# Patient Record
Sex: FEMALE | Race: WHITE | NOT HISPANIC OR LATINO | Employment: OTHER | URBAN - NONMETROPOLITAN AREA
[De-identification: names, ages, dates, MRNs, and addresses within clinical notes are randomized per-mention and may not be internally consistent; named-entity substitution may affect disease eponyms.]

---

## 2018-07-09 RX ORDER — LISINOPRIL 40 MG/1
40 TABLET ORAL DAILY
COMMUNITY
End: 2022-07-26 | Stop reason: HOSPADM

## 2018-07-09 RX ORDER — HYDROCODONE BITARTRATE AND ACETAMINOPHEN 7.5; 325 MG/1; MG/1
1 TABLET ORAL 2 TIMES DAILY
COMMUNITY
End: 2018-07-20 | Stop reason: HOSPADM

## 2018-07-09 RX ORDER — MONTELUKAST SODIUM 10 MG/1
10 TABLET ORAL NIGHTLY
COMMUNITY

## 2018-07-10 ENCOUNTER — APPOINTMENT (OUTPATIENT)
Dept: PREADMISSION TESTING | Facility: HOSPITAL | Age: 78
End: 2018-07-10

## 2018-07-10 ENCOUNTER — HOSPITAL ENCOUNTER (OUTPATIENT)
Dept: GENERAL RADIOLOGY | Facility: HOSPITAL | Age: 78
Discharge: HOME | End: 2018-07-10
Admitting: ORTHOPAEDIC SURGERY

## 2018-07-10 VITALS
BODY MASS INDEX: 23.39 KG/M2 | HEART RATE: 53 BPM | DIASTOLIC BLOOD PRESSURE: 70 MMHG | SYSTOLIC BLOOD PRESSURE: 152 MMHG | HEIGHT: 61 IN | OXYGEN SATURATION: 99 % | RESPIRATION RATE: 16 BRPM | WEIGHT: 123.9 LBS

## 2018-07-10 LAB
ALBUMIN SERPL-MCNC: 4.2 G/DL (ref 3.5–5)
ALBUMIN/GLOB SERPL: 1.3 G/DL (ref 1.1–2.5)
ALP SERPL-CCNC: 53 U/L (ref 24–120)
ALT SERPL W P-5'-P-CCNC: 25 U/L (ref 0–54)
ANION GAP SERPL CALCULATED.3IONS-SCNC: 10 MMOL/L (ref 4–13)
APTT PPP: 30.8 SECONDS (ref 24.1–34.8)
AST SERPL-CCNC: 31 U/L (ref 7–45)
BACTERIA UR QL AUTO: ABNORMAL /HPF
BASOPHILS # BLD AUTO: 0.02 10*3/MM3 (ref 0–0.2)
BASOPHILS NFR BLD AUTO: 0.5 % (ref 0–2)
BILIRUB SERPL-MCNC: 0.7 MG/DL (ref 0.1–1)
BILIRUB UR QL STRIP: NEGATIVE
BUN BLD-MCNC: 7 MG/DL (ref 5–21)
BUN/CREAT SERPL: 11.9 (ref 7–25)
CALCIUM SPEC-SCNC: 9.5 MG/DL (ref 8.4–10.4)
CHLORIDE SERPL-SCNC: 93 MMOL/L (ref 98–110)
CLARITY UR: CLEAR
CO2 SERPL-SCNC: 27 MMOL/L (ref 24–31)
COLOR UR: YELLOW
CREAT BLD-MCNC: 0.59 MG/DL (ref 0.5–1.4)
DEPRECATED RDW RBC AUTO: 41.8 FL (ref 40–54)
EOSINOPHIL # BLD AUTO: 0.04 10*3/MM3 (ref 0–0.7)
EOSINOPHIL NFR BLD AUTO: 1 % (ref 0–4)
ERYTHROCYTE [DISTWIDTH] IN BLOOD BY AUTOMATED COUNT: 13.1 % (ref 12–15)
GFR SERPL CREATININE-BSD FRML MDRD: 99 ML/MIN/1.73
GLOBULIN UR ELPH-MCNC: 3.2 GM/DL
GLUCOSE BLD-MCNC: 104 MG/DL (ref 70–100)
GLUCOSE UR STRIP-MCNC: NEGATIVE MG/DL
HCT VFR BLD AUTO: 29.7 % (ref 37–47)
HGB BLD-MCNC: 10.5 G/DL (ref 12–16)
HGB UR QL STRIP.AUTO: NEGATIVE
IMM GRANULOCYTES # BLD: 0.01 10*3/MM3 (ref 0–0.03)
IMM GRANULOCYTES NFR BLD: 0.2 % (ref 0–5)
INR PPP: 1 (ref 0.91–1.09)
KETONES UR QL STRIP: NEGATIVE
LEUKOCYTE ESTERASE UR QL STRIP.AUTO: ABNORMAL
LYMPHOCYTES # BLD AUTO: 1.5 10*3/MM3 (ref 0.72–4.86)
LYMPHOCYTES NFR BLD AUTO: 36.4 % (ref 15–45)
MCH RBC QN AUTO: 31 PG (ref 28–32)
MCHC RBC AUTO-ENTMCNC: 35.4 G/DL (ref 33–36)
MCV RBC AUTO: 87.6 FL (ref 82–98)
MONOCYTES # BLD AUTO: 0.41 10*3/MM3 (ref 0.19–1.3)
MONOCYTES NFR BLD AUTO: 10 % (ref 4–12)
NEUTROPHILS # BLD AUTO: 2.14 10*3/MM3 (ref 1.87–8.4)
NEUTROPHILS NFR BLD AUTO: 51.9 % (ref 39–78)
NITRITE UR QL STRIP: NEGATIVE
NRBC BLD MANUAL-RTO: 0 /100 WBC (ref 0–0)
PH UR STRIP.AUTO: 7 [PH] (ref 5–8)
PLATELET # BLD AUTO: 341 10*3/MM3 (ref 130–400)
PMV BLD AUTO: 10.4 FL (ref 6–12)
POTASSIUM BLD-SCNC: 4.3 MMOL/L (ref 3.5–5.3)
PROT SERPL-MCNC: 7.4 G/DL (ref 6.3–8.7)
PROT UR QL STRIP: NEGATIVE
PROTHROMBIN TIME: 13.5 SECONDS (ref 11.9–14.6)
RBC # BLD AUTO: 3.39 10*6/MM3 (ref 4.2–5.4)
RBC # UR: ABNORMAL /HPF
REF LAB TEST METHOD: ABNORMAL
SODIUM BLD-SCNC: 130 MMOL/L (ref 135–145)
SP GR UR STRIP: <=1.005 (ref 1–1.03)
SQUAMOUS #/AREA URNS HPF: ABNORMAL /HPF
UROBILINOGEN UR QL STRIP: ABNORMAL
WBC NRBC COR # BLD: 4.12 10*3/MM3 (ref 4.8–10.8)
WBC UR QL AUTO: ABNORMAL /HPF

## 2018-07-10 PROCEDURE — 71046 X-RAY EXAM CHEST 2 VIEWS: CPT

## 2018-07-10 PROCEDURE — 87086 URINE CULTURE/COLONY COUNT: CPT | Performed by: ORTHOPAEDIC SURGERY

## 2018-07-10 PROCEDURE — 85610 PROTHROMBIN TIME: CPT | Performed by: ORTHOPAEDIC SURGERY

## 2018-07-10 PROCEDURE — 93005 ELECTROCARDIOGRAM TRACING: CPT

## 2018-07-10 PROCEDURE — 87081 CULTURE SCREEN ONLY: CPT | Performed by: ORTHOPAEDIC SURGERY

## 2018-07-10 PROCEDURE — 36415 COLL VENOUS BLD VENIPUNCTURE: CPT

## 2018-07-10 PROCEDURE — 85730 THROMBOPLASTIN TIME PARTIAL: CPT | Performed by: ORTHOPAEDIC SURGERY

## 2018-07-10 PROCEDURE — 80053 COMPREHEN METABOLIC PANEL: CPT | Performed by: ORTHOPAEDIC SURGERY

## 2018-07-10 PROCEDURE — 85025 COMPLETE CBC W/AUTO DIFF WBC: CPT | Performed by: ORTHOPAEDIC SURGERY

## 2018-07-10 PROCEDURE — 93010 ELECTROCARDIOGRAM REPORT: CPT | Performed by: INTERNAL MEDICINE

## 2018-07-10 PROCEDURE — 81001 URINALYSIS AUTO W/SCOPE: CPT | Performed by: ORTHOPAEDIC SURGERY

## 2018-07-10 ASSESSMENT — HOOS JR
HOOS JR SCORE: 0
HOOS JR SCORE: 24

## 2018-07-10 NOTE — PAT
PATIENT ATTENDED TOTAL JOINT REPLACEMENT CLASS.  EDUCATION INCLUDED:  PAIN SCALE, INCENTIVE SPIROMETRY, T,C,DB, DVT PREVENTION, USE OF BACTROBAN AND CHG WASH.  PATIENT VERBALIZED UNDERSTANDING.    PT/OT/CASE MANAGEMENT AWARE OF ADMISSION

## 2018-07-11 LAB — MRSA SPEC QL CULT: NORMAL

## 2018-07-12 LAB — BACTERIA SPEC AEROBE CULT: ABNORMAL

## 2018-07-19 ENCOUNTER — ANESTHESIA (OUTPATIENT)
Dept: PERIOP | Facility: HOSPITAL | Age: 78
End: 2018-07-19

## 2018-07-19 ENCOUNTER — APPOINTMENT (OUTPATIENT)
Dept: GENERAL RADIOLOGY | Facility: HOSPITAL | Age: 78
End: 2018-07-19

## 2018-07-19 ENCOUNTER — HOSPITAL ENCOUNTER (INPATIENT)
Facility: HOSPITAL | Age: 78
LOS: 1 days | Discharge: HOME | End: 2018-07-20
Attending: ORTHOPAEDIC SURGERY | Admitting: ORTHOPAEDIC SURGERY

## 2018-07-19 ENCOUNTER — ANESTHESIA EVENT (OUTPATIENT)
Dept: PERIOP | Facility: HOSPITAL | Age: 78
End: 2018-07-19

## 2018-07-19 DIAGNOSIS — Z74.09 IMPAIRED MOBILITY AND ADLS: ICD-10-CM

## 2018-07-19 DIAGNOSIS — Z78.9 IMPAIRED MOBILITY AND ADLS: ICD-10-CM

## 2018-07-19 DIAGNOSIS — Z74.09 IMPAIRED MOBILITY: ICD-10-CM

## 2018-07-19 DIAGNOSIS — M16.31 OSTEOARTHRITIS RESULTING FROM RIGHT HIP DYSPLASIA: Primary | ICD-10-CM

## 2018-07-19 LAB
ABO GROUP BLD: NORMAL
BLD GP AB SCN SERPL QL: NEGATIVE
HCT VFR BLD AUTO: 29.6 % (ref 37–47)
HGB BLD-MCNC: 10.1 G/DL (ref 12–16)
RH BLD: POSITIVE
T&S EXPIRATION DATE: NORMAL

## 2018-07-19 PROCEDURE — 86900 BLOOD TYPING SEROLOGIC ABO: CPT

## 2018-07-19 PROCEDURE — 25010000002 FENTANYL CITRATE (PF) 100 MCG/2ML SOLUTION: Performed by: NURSE ANESTHETIST, CERTIFIED REGISTERED

## 2018-07-19 PROCEDURE — 25010000002 ROPIVACAINE PER 1 MG: Performed by: NURSE ANESTHETIST, CERTIFIED REGISTERED

## 2018-07-19 PROCEDURE — 86901 BLOOD TYPING SEROLOGIC RH(D): CPT | Performed by: ORTHOPAEDIC SURGERY

## 2018-07-19 PROCEDURE — 85014 HEMATOCRIT: CPT | Performed by: ORTHOPAEDIC SURGERY

## 2018-07-19 PROCEDURE — 25010000002 PROPOFOL 10 MG/ML EMULSION: Performed by: NURSE ANESTHETIST, CERTIFIED REGISTERED

## 2018-07-19 PROCEDURE — 36430 TRANSFUSION BLD/BLD COMPNT: CPT

## 2018-07-19 PROCEDURE — 0SR904Z REPLACEMENT OF RIGHT HIP JOINT WITH CERAMIC ON POLYETHYLENE SYNTHETIC SUBSTITUTE, OPEN APPROACH: ICD-10-PCS | Performed by: ORTHOPAEDIC SURGERY

## 2018-07-19 PROCEDURE — 25010000003 CEFAZOLIN PER 500 MG: Performed by: NURSE ANESTHETIST, CERTIFIED REGISTERED

## 2018-07-19 PROCEDURE — P9016 RBC LEUKOCYTES REDUCED: HCPCS

## 2018-07-19 PROCEDURE — C1776 JOINT DEVICE (IMPLANTABLE): HCPCS | Performed by: ORTHOPAEDIC SURGERY

## 2018-07-19 PROCEDURE — 25010000003 CEFAZOLIN PER 500 MG: Performed by: ORTHOPAEDIC SURGERY

## 2018-07-19 PROCEDURE — 85018 HEMOGLOBIN: CPT | Performed by: ORTHOPAEDIC SURGERY

## 2018-07-19 PROCEDURE — 25010000002 MIDAZOLAM PER 1 MG: Performed by: NURSE ANESTHETIST, CERTIFIED REGISTERED

## 2018-07-19 PROCEDURE — 94799 UNLISTED PULMONARY SVC/PX: CPT

## 2018-07-19 PROCEDURE — 30233N1 TRANSFUSION OF NONAUTOLOGOUS RED BLOOD CELLS INTO PERIPHERAL VEIN, PERCUTANEOUS APPROACH: ICD-10-PCS | Performed by: ORTHOPAEDIC SURGERY

## 2018-07-19 PROCEDURE — 25010000002 MORPHINE PER 10 MG: Performed by: ORTHOPAEDIC SURGERY

## 2018-07-19 PROCEDURE — 86900 BLOOD TYPING SEROLOGIC ABO: CPT | Performed by: ORTHOPAEDIC SURGERY

## 2018-07-19 PROCEDURE — 25010000002 VANCOMYCIN PER 500 MG: Performed by: ORTHOPAEDIC SURGERY

## 2018-07-19 PROCEDURE — 73502 X-RAY EXAM HIP UNI 2-3 VIEWS: CPT

## 2018-07-19 PROCEDURE — 76000 FLUOROSCOPY <1 HR PHYS/QHP: CPT

## 2018-07-19 PROCEDURE — 86920 COMPATIBILITY TEST SPIN: CPT

## 2018-07-19 PROCEDURE — 86901 BLOOD TYPING SEROLOGIC RH(D): CPT

## 2018-07-19 PROCEDURE — 86850 RBC ANTIBODY SCREEN: CPT | Performed by: ORTHOPAEDIC SURGERY

## 2018-07-19 DEVICE — PINNACLE CANCELLOUS BONE SCREW 6.5MM X 15MM
Type: IMPLANTABLE DEVICE | Status: FUNCTIONAL
Brand: PINNACLE

## 2018-07-19 DEVICE — BIOLOX DELTA CERAMIC FEMORAL HEAD 32MM DIA +1 12/14 TAPER
Type: IMPLANTABLE DEVICE | Status: FUNCTIONAL
Brand: BIOLOX DELTA

## 2018-07-19 DEVICE — PINNACLE CANCELLOUS BONE SCREW 6.5MM X 25MM
Type: IMPLANTABLE DEVICE | Status: FUNCTIONAL
Brand: PINNACLE

## 2018-07-19 DEVICE — TOTL HIP STEM DEPUY UPCHRG: Type: IMPLANTABLE DEVICE | Status: FUNCTIONAL

## 2018-07-19 DEVICE — PINNACLE GRIPTION ACETABULAR SHELL MULTI-HOLE 50MM OD
Type: IMPLANTABLE DEVICE | Status: FUNCTIONAL
Brand: PINNACLE GRIPTION

## 2018-07-19 DEVICE — TOTL HIP M/H GRIPTION CUP DEPUY UPCHRG: Type: IMPLANTABLE DEVICE | Status: FUNCTIONAL

## 2018-07-19 DEVICE — PINNACLE HIP SOLUTIONS ALTRX POLYETHYLENE ACETABULAR LINER NEUTRAL 32MM ID 50MM OD
Type: IMPLANTABLE DEVICE | Status: FUNCTIONAL
Brand: PINNACLE ALTRX

## 2018-07-19 DEVICE — TOTL HIP COA DEPUY 9641334: Type: IMPLANTABLE DEVICE | Status: FUNCTIONAL

## 2018-07-19 DEVICE — CORAIL HIP SYSTEM CEMENTLESS FEMORAL STEM 12/14 AMT 135 DEGREES KA SIZE 12 HA COATED STANDARD COLLAR
Type: IMPLANTABLE DEVICE | Status: FUNCTIONAL
Brand: CORAIL

## 2018-07-19 RX ORDER — MONTELUKAST SODIUM 10 MG/1
10 TABLET ORAL NIGHTLY
Status: DISCONTINUED | OUTPATIENT
Start: 2018-07-19 | End: 2018-07-20 | Stop reason: HOSPADM

## 2018-07-19 RX ORDER — ONDANSETRON 2 MG/ML
4 INJECTION INTRAMUSCULAR; INTRAVENOUS AS NEEDED
Status: DISCONTINUED | OUTPATIENT
Start: 2018-07-19 | End: 2018-07-19 | Stop reason: HOSPADM

## 2018-07-19 RX ORDER — ACETAMINOPHEN 500 MG
1000 TABLET ORAL ONCE
Status: COMPLETED | OUTPATIENT
Start: 2018-07-19 | End: 2018-07-19

## 2018-07-19 RX ORDER — MIDAZOLAM HYDROCHLORIDE 1 MG/ML
1 INJECTION INTRAMUSCULAR; INTRAVENOUS
Status: DISCONTINUED | OUTPATIENT
Start: 2018-07-19 | End: 2018-07-19 | Stop reason: HOSPADM

## 2018-07-19 RX ORDER — ROPIVACAINE HYDROCHLORIDE 5 MG/ML
INJECTION, SOLUTION EPIDURAL; INFILTRATION; PERINEURAL AS NEEDED
Status: DISCONTINUED | OUTPATIENT
Start: 2018-07-19 | End: 2018-07-19 | Stop reason: SURG

## 2018-07-19 RX ORDER — MAGNESIUM HYDROXIDE 1200 MG/15ML
LIQUID ORAL AS NEEDED
Status: DISCONTINUED | OUTPATIENT
Start: 2018-07-19 | End: 2018-07-19 | Stop reason: HOSPADM

## 2018-07-19 RX ORDER — SODIUM CHLORIDE 9 MG/ML
INJECTION, SOLUTION INTRAVENOUS AS NEEDED
Status: DISCONTINUED | OUTPATIENT
Start: 2018-07-19 | End: 2018-07-19 | Stop reason: HOSPADM

## 2018-07-19 RX ORDER — MIDAZOLAM HYDROCHLORIDE 1 MG/ML
2 INJECTION INTRAMUSCULAR; INTRAVENOUS
Status: DISCONTINUED | OUTPATIENT
Start: 2018-07-19 | End: 2018-07-19 | Stop reason: HOSPADM

## 2018-07-19 RX ORDER — SODIUM CHLORIDE, SODIUM LACTATE, POTASSIUM CHLORIDE, CALCIUM CHLORIDE 600; 310; 30; 20 MG/100ML; MG/100ML; MG/100ML; MG/100ML
100 INJECTION, SOLUTION INTRAVENOUS CONTINUOUS
Status: DISCONTINUED | OUTPATIENT
Start: 2018-07-19 | End: 2018-07-19 | Stop reason: SDUPTHER

## 2018-07-19 RX ORDER — MEPERIDINE HYDROCHLORIDE 50 MG/ML
12.5 INJECTION INTRAMUSCULAR; INTRAVENOUS; SUBCUTANEOUS
Status: DISCONTINUED | OUTPATIENT
Start: 2018-07-19 | End: 2018-07-19 | Stop reason: HOSPADM

## 2018-07-19 RX ORDER — LABETALOL HYDROCHLORIDE 5 MG/ML
5 INJECTION, SOLUTION INTRAVENOUS
Status: DISCONTINUED | OUTPATIENT
Start: 2018-07-19 | End: 2018-07-19 | Stop reason: HOSPADM

## 2018-07-19 RX ORDER — NALOXONE HCL 0.4 MG/ML
0.04 VIAL (ML) INJECTION AS NEEDED
Status: DISCONTINUED | OUTPATIENT
Start: 2018-07-19 | End: 2018-07-19 | Stop reason: HOSPADM

## 2018-07-19 RX ORDER — SODIUM CHLORIDE 9 MG/ML
50 INJECTION, SOLUTION INTRAVENOUS CONTINUOUS
Status: DISPENSED | OUTPATIENT
Start: 2018-07-19 | End: 2018-07-20

## 2018-07-19 RX ORDER — METOCLOPRAMIDE HYDROCHLORIDE 5 MG/ML
5 INJECTION INTRAMUSCULAR; INTRAVENOUS
Status: DISCONTINUED | OUTPATIENT
Start: 2018-07-19 | End: 2018-07-19 | Stop reason: HOSPADM

## 2018-07-19 RX ORDER — DIPHENHYDRAMINE HCL 25 MG
25 CAPSULE ORAL EVERY 6 HOURS PRN
Status: DISCONTINUED | OUTPATIENT
Start: 2018-07-19 | End: 2018-07-20 | Stop reason: HOSPADM

## 2018-07-19 RX ORDER — ROCURONIUM BROMIDE 10 MG/ML
INJECTION, SOLUTION INTRAVENOUS AS NEEDED
Status: DISCONTINUED | OUTPATIENT
Start: 2018-07-19 | End: 2018-07-19 | Stop reason: SURG

## 2018-07-19 RX ORDER — IPRATROPIUM BROMIDE AND ALBUTEROL SULFATE 2.5; .5 MG/3ML; MG/3ML
3 SOLUTION RESPIRATORY (INHALATION) ONCE AS NEEDED
Status: DISCONTINUED | OUTPATIENT
Start: 2018-07-19 | End: 2018-07-19 | Stop reason: HOSPADM

## 2018-07-19 RX ORDER — ONDANSETRON 4 MG/1
4 TABLET, ORALLY DISINTEGRATING ORAL EVERY 6 HOURS PRN
Status: DISCONTINUED | OUTPATIENT
Start: 2018-07-19 | End: 2018-07-20 | Stop reason: HOSPADM

## 2018-07-19 RX ORDER — DIAZEPAM 5 MG/1
5 TABLET ORAL EVERY 6 HOURS PRN
Status: DISCONTINUED | OUTPATIENT
Start: 2018-07-19 | End: 2018-07-20 | Stop reason: HOSPADM

## 2018-07-19 RX ORDER — NALOXONE HCL 0.4 MG/ML
0.4 VIAL (ML) INJECTION
Status: DISCONTINUED | OUTPATIENT
Start: 2018-07-19 | End: 2018-07-20 | Stop reason: HOSPADM

## 2018-07-19 RX ORDER — ACETAMINOPHEN 160 MG/5ML
650 SOLUTION ORAL EVERY 4 HOURS PRN
Status: DISCONTINUED | OUTPATIENT
Start: 2018-07-19 | End: 2018-07-20 | Stop reason: HOSPADM

## 2018-07-19 RX ORDER — HYDRALAZINE HYDROCHLORIDE 20 MG/ML
5 INJECTION INTRAMUSCULAR; INTRAVENOUS
Status: DISCONTINUED | OUTPATIENT
Start: 2018-07-19 | End: 2018-07-19 | Stop reason: HOSPADM

## 2018-07-19 RX ORDER — DIPHENHYDRAMINE HYDROCHLORIDE 50 MG/ML
25 INJECTION INTRAMUSCULAR; INTRAVENOUS EVERY 6 HOURS PRN
Status: DISCONTINUED | OUTPATIENT
Start: 2018-07-19 | End: 2018-07-20 | Stop reason: HOSPADM

## 2018-07-19 RX ORDER — HYDROMORPHONE HYDROCHLORIDE 1 MG/ML
0.5 INJECTION, SOLUTION INTRAMUSCULAR; INTRAVENOUS; SUBCUTANEOUS
Status: DISCONTINUED | OUTPATIENT
Start: 2018-07-19 | End: 2018-07-20 | Stop reason: HOSPADM

## 2018-07-19 RX ORDER — ONDANSETRON 2 MG/ML
4 INJECTION INTRAMUSCULAR; INTRAVENOUS EVERY 6 HOURS PRN
Status: DISCONTINUED | OUTPATIENT
Start: 2018-07-19 | End: 2018-07-20 | Stop reason: HOSPADM

## 2018-07-19 RX ORDER — OXYCODONE AND ACETAMINOPHEN 10; 325 MG/1; MG/1
1 TABLET ORAL EVERY 4 HOURS PRN
Status: DISCONTINUED | OUTPATIENT
Start: 2018-07-19 | End: 2018-07-20 | Stop reason: HOSPADM

## 2018-07-19 RX ORDER — MORPHINE SULFATE 2 MG/ML
2 INJECTION, SOLUTION INTRAMUSCULAR; INTRAVENOUS
Status: DISCONTINUED | OUTPATIENT
Start: 2018-07-19 | End: 2018-07-19 | Stop reason: HOSPADM

## 2018-07-19 RX ORDER — DOCUSATE SODIUM 100 MG/1
100 CAPSULE, LIQUID FILLED ORAL 2 TIMES DAILY PRN
Status: DISCONTINUED | OUTPATIENT
Start: 2018-07-19 | End: 2018-07-20 | Stop reason: HOSPADM

## 2018-07-19 RX ORDER — LISINOPRIL 20 MG/1
40 TABLET ORAL DAILY
Status: DISCONTINUED | OUTPATIENT
Start: 2018-07-19 | End: 2018-07-20 | Stop reason: HOSPADM

## 2018-07-19 RX ORDER — TRANEXAMIC ACID 100 MG/ML
INJECTION, SOLUTION INTRAVENOUS AS NEEDED
Status: DISCONTINUED | OUTPATIENT
Start: 2018-07-19 | End: 2018-07-19 | Stop reason: SURG

## 2018-07-19 RX ORDER — MORPHINE SULFATE 4 MG/ML
4 INJECTION, SOLUTION INTRAMUSCULAR; INTRAVENOUS
Status: DISCONTINUED | OUTPATIENT
Start: 2018-07-19 | End: 2018-07-20 | Stop reason: HOSPADM

## 2018-07-19 RX ORDER — SODIUM CHLORIDE 9 MG/ML
INJECTION, SOLUTION INTRAVENOUS CONTINUOUS PRN
Status: DISCONTINUED | OUTPATIENT
Start: 2018-07-19 | End: 2018-07-19 | Stop reason: SURG

## 2018-07-19 RX ORDER — PROPOFOL 10 MG/ML
VIAL (ML) INTRAVENOUS AS NEEDED
Status: DISCONTINUED | OUTPATIENT
Start: 2018-07-19 | End: 2018-07-19 | Stop reason: SURG

## 2018-07-19 RX ORDER — SODIUM CHLORIDE 0.9 % (FLUSH) 0.9 %
3 SYRINGE (ML) INJECTION AS NEEDED
Status: DISCONTINUED | OUTPATIENT
Start: 2018-07-19 | End: 2018-07-19 | Stop reason: HOSPADM

## 2018-07-19 RX ORDER — ROPIVACAINE HYDROCHLORIDE 2 MG/ML
INJECTION, SOLUTION EPIDURAL; INFILTRATION; PERINEURAL AS NEEDED
Status: DISCONTINUED | OUTPATIENT
Start: 2018-07-19 | End: 2018-07-19 | Stop reason: SURG

## 2018-07-19 RX ORDER — NALOXONE HCL 0.4 MG/ML
0.1 VIAL (ML) INJECTION
Status: DISCONTINUED | OUTPATIENT
Start: 2018-07-19 | End: 2018-07-20 | Stop reason: HOSPADM

## 2018-07-19 RX ORDER — SODIUM CHLORIDE, SODIUM LACTATE, POTASSIUM CHLORIDE, CALCIUM CHLORIDE 600; 310; 30; 20 MG/100ML; MG/100ML; MG/100ML; MG/100ML
1000 INJECTION, SOLUTION INTRAVENOUS CONTINUOUS
Status: DISCONTINUED | OUTPATIENT
Start: 2018-07-19 | End: 2018-07-19

## 2018-07-19 RX ORDER — OXYCODONE HYDROCHLORIDE 5 MG/1
5 TABLET ORAL EVERY 4 HOURS PRN
Status: DISCONTINUED | OUTPATIENT
Start: 2018-07-19 | End: 2018-07-20 | Stop reason: HOSPADM

## 2018-07-19 RX ORDER — LATANOPROST 50 UG/ML
1 SOLUTION/ DROPS OPHTHALMIC NIGHTLY
Status: DISCONTINUED | OUTPATIENT
Start: 2018-07-19 | End: 2018-07-20 | Stop reason: HOSPADM

## 2018-07-19 RX ORDER — SODIUM CHLORIDE 0.9 % (FLUSH) 0.9 %
1-10 SYRINGE (ML) INJECTION AS NEEDED
Status: DISCONTINUED | OUTPATIENT
Start: 2018-07-19 | End: 2018-07-19 | Stop reason: HOSPADM

## 2018-07-19 RX ORDER — LIDOCAINE HYDROCHLORIDE 20 MG/ML
INJECTION, SOLUTION INFILTRATION; PERINEURAL AS NEEDED
Status: DISCONTINUED | OUTPATIENT
Start: 2018-07-19 | End: 2018-07-19 | Stop reason: SURG

## 2018-07-19 RX ORDER — FENTANYL CITRATE 50 UG/ML
INJECTION, SOLUTION INTRAMUSCULAR; INTRAVENOUS
Status: COMPLETED
Start: 2018-07-19 | End: 2018-07-19

## 2018-07-19 RX ORDER — FLUMAZENIL 0.1 MG/ML
0.2 INJECTION INTRAVENOUS AS NEEDED
Status: DISCONTINUED | OUTPATIENT
Start: 2018-07-19 | End: 2018-07-19 | Stop reason: HOSPADM

## 2018-07-19 RX ORDER — OXYCODONE AND ACETAMINOPHEN 10; 325 MG/1; MG/1
1 TABLET ORAL ONCE AS NEEDED
Status: DISCONTINUED | OUTPATIENT
Start: 2018-07-19 | End: 2018-07-19 | Stop reason: HOSPADM

## 2018-07-19 RX ORDER — FENTANYL CITRATE 50 UG/ML
25 INJECTION, SOLUTION INTRAMUSCULAR; INTRAVENOUS AS NEEDED
Status: DISCONTINUED | OUTPATIENT
Start: 2018-07-19 | End: 2018-07-19 | Stop reason: HOSPADM

## 2018-07-19 RX ORDER — CEFAZOLIN SODIUM 1 G/3ML
INJECTION, POWDER, FOR SOLUTION INTRAMUSCULAR; INTRAVENOUS AS NEEDED
Status: DISCONTINUED | OUTPATIENT
Start: 2018-07-19 | End: 2018-07-19 | Stop reason: SURG

## 2018-07-19 RX ORDER — ACETAMINOPHEN 650 MG/1
650 SUPPOSITORY RECTAL EVERY 4 HOURS PRN
Status: DISCONTINUED | OUTPATIENT
Start: 2018-07-19 | End: 2018-07-20 | Stop reason: HOSPADM

## 2018-07-19 RX ORDER — ONDANSETRON 4 MG/1
4 TABLET, FILM COATED ORAL EVERY 6 HOURS PRN
Status: DISCONTINUED | OUTPATIENT
Start: 2018-07-19 | End: 2018-07-20 | Stop reason: HOSPADM

## 2018-07-19 RX ORDER — FENTANYL CITRATE 50 UG/ML
INJECTION, SOLUTION INTRAMUSCULAR; INTRAVENOUS AS NEEDED
Status: DISCONTINUED | OUTPATIENT
Start: 2018-07-19 | End: 2018-07-19 | Stop reason: SURG

## 2018-07-19 RX ORDER — ACETAMINOPHEN 325 MG/1
650 TABLET ORAL EVERY 4 HOURS PRN
Status: DISCONTINUED | OUTPATIENT
Start: 2018-07-19 | End: 2018-07-20 | Stop reason: HOSPADM

## 2018-07-19 RX ORDER — VANCOMYCIN HYDROCHLORIDE 1 G/200ML
INJECTION, SOLUTION INTRAVENOUS CONTINUOUS PRN
Status: COMPLETED | OUTPATIENT
Start: 2018-07-19 | End: 2018-07-19

## 2018-07-19 RX ADMIN — ACETAMINOPHEN 1000 MG: 500 TABLET, FILM COATED ORAL at 12:30

## 2018-07-19 RX ADMIN — LIDOCAINE HYDROCHLORIDE 0.5 ML: 10 INJECTION, SOLUTION EPIDURAL; INFILTRATION; INTRACAUDAL; PERINEURAL at 11:56

## 2018-07-19 RX ADMIN — FENTANYL CITRATE 50 MCG: 50 INJECTION, SOLUTION INTRAMUSCULAR; INTRAVENOUS at 13:56

## 2018-07-19 RX ADMIN — SODIUM CHLORIDE: 9 INJECTION, SOLUTION INTRAVENOUS at 14:38

## 2018-07-19 RX ADMIN — TRANEXAMIC ACID 1000 MG: 100 INJECTION, SOLUTION INTRAVENOUS at 13:30

## 2018-07-19 RX ADMIN — ROCURONIUM BROMIDE 30 MG: 10 INJECTION INTRAVENOUS at 12:56

## 2018-07-19 RX ADMIN — ROPIVACAINE HYDROCHLORIDE 20 ML: 2 INJECTION, SOLUTION EPIDURAL; INFILTRATION at 12:39

## 2018-07-19 RX ADMIN — MIDAZOLAM 1 MG: 1 INJECTION INTRAMUSCULAR; INTRAVENOUS at 12:31

## 2018-07-19 RX ADMIN — LIDOCAINE HYDROCHLORIDE 100 MG: 20 INJECTION, SOLUTION INFILTRATION; PERINEURAL at 12:56

## 2018-07-19 RX ADMIN — EPHEDRINE SULFATE 10 MG: 50 INJECTION INTRAMUSCULAR; INTRAVENOUS; SUBCUTANEOUS at 14:15

## 2018-07-19 RX ADMIN — CHOLECALCIFEROL CAP 125 MCG (5000 UNIT) 5000 UNITS: 125 CAP at 18:33

## 2018-07-19 RX ADMIN — POLYETHYLENE GLYCOL (3350) 17 G: 17 POWDER, FOR SOLUTION ORAL at 18:32

## 2018-07-19 RX ADMIN — MORPHINE SULFATE 4 MG: 4 INJECTION, SOLUTION INTRAMUSCULAR; INTRAVENOUS at 17:13

## 2018-07-19 RX ADMIN — CEFAZOLIN 2 G: 330 INJECTION, POWDER, FOR SOLUTION INTRAMUSCULAR; INTRAVENOUS at 20:12

## 2018-07-19 RX ADMIN — FENTANYL CITRATE 50 MCG: 50 INJECTION, SOLUTION INTRAMUSCULAR; INTRAVENOUS at 12:56

## 2018-07-19 RX ADMIN — LISINOPRIL 40 MG: 20 TABLET ORAL at 18:33

## 2018-07-19 RX ADMIN — SODIUM CHLORIDE, POTASSIUM CHLORIDE, SODIUM LACTATE AND CALCIUM CHLORIDE 1000 ML: 600; 310; 30; 20 INJECTION, SOLUTION INTRAVENOUS at 11:56

## 2018-07-19 RX ADMIN — MONTELUKAST SODIUM 10 MG: 10 TABLET, FILM COATED ORAL at 20:13

## 2018-07-19 RX ADMIN — CEFAZOLIN 1 G: 1 INJECTION, POWDER, FOR SOLUTION INTRAVENOUS at 13:03

## 2018-07-19 RX ADMIN — PROPOFOL 80 MG: 10 INJECTION, EMULSION INTRAVENOUS at 12:56

## 2018-07-19 RX ADMIN — LIDOCAINE HYDROCHLORIDE 0.5 ML: 10 INJECTION, SOLUTION EPIDURAL; INFILTRATION; INTRACAUDAL; PERINEURAL at 12:37

## 2018-07-19 RX ADMIN — ROPIVACAINE HYDROCHLORIDE 20 ML: 5 INJECTION, SOLUTION EPIDURAL; INFILTRATION; PERINEURAL at 12:39

## 2018-07-19 RX ADMIN — LATANOPROST 1 DROP: 50 SOLUTION OPHTHALMIC at 20:13

## 2018-07-19 RX ADMIN — EPHEDRINE SULFATE 20 MG: 50 INJECTION INTRAMUSCULAR; INTRAVENOUS; SUBCUTANEOUS at 13:07

## 2018-07-19 RX ADMIN — FENTANYL CITRATE 50 MCG: 50 INJECTION, SOLUTION INTRAMUSCULAR; INTRAVENOUS at 14:27

## 2018-07-19 RX ADMIN — FENTANYL CITRATE 50 MCG: 50 INJECTION, SOLUTION INTRAMUSCULAR; INTRAVENOUS at 12:37

## 2018-07-19 RX ADMIN — SODIUM CHLORIDE 50 ML/HR: 9 INJECTION, SOLUTION INTRAVENOUS at 20:12

## 2018-07-19 NOTE — ANESTHESIA PROCEDURE NOTES
Airway  Urgency: elective    Airway not difficult    General Information and Staff    Patient location during procedure: OR  CRNA: EARNESTINE PAGE    Indications and Patient Condition  Indications for airway management: airway protection    Preoxygenated: yes  Mask difficulty assessment: 1 - vent by mask    Final Airway Details  Final airway type: endotracheal airway      Successful airway: ETT  Cuffed: yes   Successful intubation technique: direct laryngoscopy  Facilitating devices/methods: intubating stylet  Endotracheal tube insertion site: oral  Blade: Pina  Blade size: #3  ETT size: 7.5 mm  Cormack-Lehane Classification: grade I - full view of glottis  Placement verified by: chest auscultation and capnometry   Cuff volume (mL): 8  Measured from: teeth  ETT to teeth (cm): 20  Number of attempts at approach: 1

## 2018-07-19 NOTE — DISCHARGE INSTRUCTIONS
DAY OF SURGERY INSTRUCTIONS        YOUR SURGEON: DR. LESA DAVIDSON    PROCEDURE: RIGHT TOTAL HIP REPLACEMENT    DATE OF SURGERY: July 19, 2018    ARRIVAL TIME: AS DIRECTED BY OFFICE    DAY OF SURGERY TAKE ONLY THESE MEDICATIONS UNLESS OTHERWISE INSTRUCTED BY YOUR PHYSICIAN: HYDROCODONE WITH A SIP OF WATER    DO NOT TAKE LISINOPRIL THE MORNING OF SURGERY          MANAGING PAIN AFTER SURGERY    We know you are probably wondering what your pain will be like after surgery.  Following surgery it is unrealistic to expect you will not have pain.   Pain is how our bodies let us know that something is wrong or cautions us to be careful.  That said, our goal is to make your pain tolerable.    Methods we may use to treat your pain include (oral or IV medications, PCAs, epidurals, nerve blocks, etc.)   While some procedures require IV pain medications for a short time after surgery, transitioning to pain medications by mouth allows for better management of pain.   Your nurse will encourage you to take oral pain medications whenever possible.  IV medications work almost immediately, but only last a short while.  Taking medications by mouth allows for a more constant level of medication in your blood stream for a longer period of time.      Once your pain is out of control it is harder to get back under control.  It is important you are aware when your next dose of pain medication is due.  If you are admitted, your nurse may write the time of your next dose on the white board in your room to help you remember.      We are interested in your pain and encourage you to inform us about aggravating factors during your visit.   Many times a simple repositioning every few hours can make a big difference.    If your physician says it is okay, do not let your pain prevent you from getting out of bed. Be sure to call your nurse for assistance prior to getting up so you do not fall.      Before surgery, please decide your tolerable pain  goal.  These faces help describe the pain ratings we use on a 0-10 scale.   Be prepared to tell us your goal and whether or not you take pain or anxiety medications at home.            BEFORE YOU COME TO THE HOSPITAL  (Pre-op instructions)  • Do not eat, drink, smoke or chew gum after midnight the night before surgery.  This also includes no mints.  • Morning of surgery take only the medicines you have been instructed with a sip of water unless otherwise instructed  by your physician.  • Do not shave, wear makeup or dark nail polish.  • Remove all jewelry including rings.  • Leave anything you consider valuable at home.  • Leave your suitcase in the car until after your surgery.  • Bring the following with you if applicable:  o Picture ID and insurance, Medicare or Medicaid cards  o Co-pay/deductible required by insurance (cash, check, credit card)  o Copy of advance directive, living will or power-of- documents if not brought to PAT  o CPAP or BIPAP mask and tubing  o Relaxation aids (MP3 player, book, magazine)  • On the day of surgery check in at registration located at the main entrance of the hospital.      Outpatient Surgery Guidelines, Adult  Outpatient procedures are those for which the person having the procedure is allowed to go home the same day as the procedure. Various procedures are done on an outpatient basis. You should follow some general guidelines if you will be having an outpatient procedure.  LET YOUR HEALTH CARE PROVIDER KNOW ABOUT:  · Any allergies you have.  · All medicines you are taking, including vitamins, herbs, eye drops, creams, and over-the-counter medicines.  · Previous problems you or members of your family have had with the use of anesthetics.  · Any blood disorders you have.  · Previous surgeries you have had.  · Medical conditions you have.  RISKS AND COMPLICATIONS  Your health care provider will discuss possible risks and complications with you before surgery. Common  risks and complications include:    · Problems due to the use of anesthetics.  · Blood loss and replacement (does not apply to minor surgical procedures).  · Temporary increase in pain due to surgery.  · Uncorrected pain or problems that the surgery was meant to correct.  · Infection.  · New damage.  BEFORE THE PROCEDURE  · Ask your health care provider about changing or stopping your regular medicines. You may need to stop taking certain medicines in the days or weeks before the procedure.  · Stop smoking at least 2 weeks before surgery. This lowers your risk for complications during and after surgery. Ask your health care provider for help with this if needed.  · Eat your usual meals and a light supper the day before surgery. Continue fluid intake. Do not drink alcohol.  · Do not eat or drink after midnight the night before your surgery.   · Arrange for someone to take you home and to stay with you for 24 hours after the procedure. Medicine given for your procedure may affect your ability to drive or to care for yourself.  · Call your health care provider's office if you develop an illness or problem that may prevent you from safely having your procedure.  AFTER THE PROCEDURE  After surgery, you will be taken to a recovery area, where your progress will be monitored. If there are no complications, you will be allowed to go home when you are awake, stable, and taking fluids well. You may have numbness around the surgical site. Healing will take some time. You will have tenderness at the surgical site and may have some swelling and bruising. You may also have some nausea.  HOME CARE INSTRUCTIONS  · Do not drive for 24 hours, or as directed by your health care provider. Do not drive while taking prescription pain medicines.  · Do not drink alcohol for 24 hours.  · Do not make important decisions or sign legal documents for 24 hours.  · You may resume a normal diet and activities as directed.  · Do not lift anything  heavier than 10 pounds (4.5 kg) or play contact sports until your health care provider says it is okay.  · Change your bandages (dressings) as directed.  · Only take over-the-counter or prescription medicines as directed by your health care provider.  · Follow up with your health care provider as directed.  SEEK MEDICAL CARE IF:  · You have increased bleeding (more than a small spot) from the surgical site.  · You have redness, swelling, or increasing pain in the wound.  · You see pus coming from the wound.  · You have a fever.  · You notice a bad smell coming from the wound or dressing.  · You feel lightheaded or faint.  · You develop a rash.  · You have trouble breathing.  · You develop allergies.  MAKE SURE YOU:  · Understand these instructions.  · Will watch your condition.  · Will get help right away if you are not doing well or get worse.     This information is not intended to replace advice given to you by your health care provider. Make sure you discuss any questions you have with your health care provider.     Document Released: 09/12/2002 Document Revised: 05/03/2016 Document Reviewed: 05/22/2014  [employer] Interactive Patient Education ©2016 [employer]       Fall Prevention in Hospitals, Adult  As a hospital patient, your condition and the treatments you receive can increase your risk for falls. Some additional risk factors for falls in a hospital include:  · Being in an unfamiliar environment.  · Being on bed rest.  · Your surgery.  · Taking certain medicines.  · Your tubing requirements, such as intravenous (IV) therapy or catheters.  It is important that you learn how to decrease fall risks while at the hospital. Below are important tips that can help prevent falls.  SAFETY TIPS FOR PREVENTING FALLS  Talk about your risk of falling.  · Ask your health care provider why you are at risk for falling. Is it your medicine, illness, tubing placement, or something else?  · Make a plan with your health care  provider to keep you safe from falls.  · Ask your health care provider or pharmacist about side effects of your medicines. Some medicines can make you dizzy or affect your coordination.  Ask for help.  · Ask for help before getting out of bed. You may need to press your call button.  · Ask for assistance in getting safely to the toilet.  · Ask for a walker or cane to be put at your bedside. Ask that most of the side rails on your bed be placed up before your health care provider leaves the room.  · Ask family or friends to sit with you.  · Ask for things that are out of your reach, such as your glasses, hearing aids, telephone, bedside table, or call button.  Follow these tips to avoid falling:  · Stay lying or seated, rather than standing, while waiting for help.  · Wear rubber-soled slippers or shoes whenever you walk in the hospital.  · Avoid quick, sudden movements.  ¨ Change positions slowly.  ¨ Sit on the side of your bed before standing.  ¨ Stand up slowly and wait before you start to walk.  · Let your health care provider know if there is a spill on the floor.  · Pay careful attention to the medical equipment, electrical cords, and tubes around you.  · When you need help, use your call button by your bed or in the bathroom. Wait for one of your health care providers to help you.  · If you feel dizzy or unsure of your footing, return to bed and wait for assistance.  · Avoid being distracted by the TV, telephone, or another person in your room.  · Do not lean or support yourself on rolling objects, such as IV poles or bedside tables.     This information is not intended to replace advice given to you by your health care provider. Make sure you discuss any questions you have with your health care provider.     Document Released: 12/15/2001 Document Revised: 01/08/2016 Document Reviewed: 08/25/2013  [employer] Interactive Patient Education ©2016 [employer]     .     Bactroban Nasal Ointment  If your physician  ordered Bactroban nasal ointment please follow these instructions.   Use:  Morning and night on the day before your surgery and the morning of your surgery.  How to use:  Assure the tube is open; squirt on Q tip provided and swab thoroughly in each nostril.  Why:  To reduce the bacteria count in your nose.      Preparing the Skin Before Surgery  Preparing or “prepping” skin before surgery can reduce the risk of infection at the surgical site. To make the process easier, [facility] has chosen  4% Chlorhexidine Gluconate (CHG) antiseptic solution.   The steps below outline the prepping process and should be carefully followed.                                                                                                                                                      Prep the skin at the following time(s):                                                        We recommend you shower the night before surgery, and again the morning of surgery with the 4% CHG antiseptic solution using half of the bottle each time.  Dress in clean clothes/sleepwear after showering.  See instructions below for application.              Do not apply any lotions or moisturizers.           Do not shave the area to be prepped for at least 2 days prior to surgery.        Clipping the hair may be done immediately prior to your surgery at the hospital if needed.    Directions:  Thoroughly rinse your body with water.  Apply 4% CHG to a cloth and wash skin gently, paying special attention to the operative site.  Rinse again thoroughly.   Once you have begun using this product do not apply anything else to your skin. If itching or redness persists, rinse affected areas and discontinue use.    When using this product:  • Keep out of eyes, ears, and mouth.  • If solution should contact these areas, rinse out promptly and thoroughly with water.  • For external use only.  • Do not use in genital area, on your face or  head.    ____________    PATIENT/FAMILY/RESPONSIBLE PARTY VERBALIZES UNDERSTANDING OF ABOVE EDUCATION.  COPY OF PAIN SCALE GIVEN AND REVIEWED WITH VERBALIZED UNDERSTANDING.            Lower Extremity Post-op Instructions  Dr. DAVIDSON      POST-OP CARE: Please follow these instructions closely!    IMPORTANT PHONE NUMBERS:  • For emergencies, please call 911  • You may reach Dr. Davidson or his medical assistant Maribel Ruma at [phone], M-F 8:0am-5:00pm  • After 5pm or on the weekends, please call the answering service which can be reached from the number above   • Call immediately if you have any of the following symptoms:  - Elevated temperature above 101.5 degrees for more than 48 hours after surgery  - Persistent drainage  from wound  - Severe pain around surgical site  - Calf pain    Weight Bearing:   __X___ Weight Bearing as tolerated (with or without crutches)    Bathing:  If stated below, it is ok to remove your postop dressing and shower.  DO NOT SOAK the incision in water.  Pat the incision site dry after surgery  _X__ You may remove your dressing and shower on the 4th day following surgery; if you shower before this, please cover your incision thoroughly    Dressings: Do NOT remove dressing/splint unless unless told to do so. SOME DRAINAGE IS NORMAL!  • If you have a splint or cast, do NOT get wet!!    • DO NOT touch, remove, or apply ointment to the incision and/or steri strips  • Steri strips may fall off on their own  • Signs of infection that warrant a phone call to our clinical line:  o Excessive drainage or redness  o Red streaking coming away from the incision  o Increased pain  o Increased temperature above 101 degrees    Sutures:  If your physician uses sutures in your knee or ankle, they will dissolve on their own and will not need to be removed.  Black sutures occasionally used will need to be removed 10-14 days after surgery.    Elevate: Place 2 pillows under your ankle to get the incision area  above the level of the heart to help in swelling (a recliner is not elevated!!!)    Ice: Ice your surgery site 5-6 times per day for 20 minutes at a time with dressing in place. You should wait at least 30 minutes before icing again to avoid ice irritation. It may be difficult at first to ice the surgery area due to the amount of dressing, but continue to be diligent with icing.  Your dressings will be taken down at your first post-op appointment.     Range of motion:   - For the knee- It is important to gain gain full extension (knee straight) as soon as possible following surgery.         Ice to decrease swelling --> Knee fully straight --> Walk without a limp!!!  - NO PILLOWS UNDER THE KNEE, ONLY under the ankle  - For the foot/ankle- range of motion restrictions will be given to you at your first post-op appointment. Until that time, avoid any unnecessary range o f motion.  - Physical therapy- Your physical therapy status will be discussed with you at your first post-op appointment.   **Achieving range of motion goals and decreasing swelling/inflammation are the primary focus for the first two (2) weeks following surgery. **    Medications: You will be discharged with the appropriate medications following your surgery. Fill these at the pharmacy and take them as directed on the label.   Possible medications that will be prescribed are below.  You may or may not receive all of these. Occasionally, additional medications may be given with specific instructions.  Percocet/Lortab (oxycodone/hydrocodone with tylenol) - Pain Medication.  o Take one tablet every 4-6 hours. DO NOT EXCEED 4,000mg of Tylenol in 24 hours.        **Itching is not an allergy - take benadryl or an over the counter allergy medication (claritin, Zyrtec) if needed  **DO NOT MIX WITH ALCOHOL, DRIVE WHILE TAKING, OR TAKE EXTRA TYLENOL*   Zofran - Anti-nausea medication to help prevent nausea and vomiting after surgery.     Xarelto 10 mg - all  patients with lower extremity surgery should take one 10 mg for 21 days after surgery - then 325 mg Aspirin twice daily for additional 3                           weeks    DO NOT TAKE NSAID'S (ex. IBUPROFEN, MOTRIN, ALEVE, ETC) AFTER A BROKEN BONE HAS BEEN REPAIRED OR AFTER ACL SURGERY - THESE MEDICATIONS WILL SLOW THE HEALING PROCESS!!!    **If you are running low on pain medications, please notify us if you need a refill 24-48 hours prior to when you run out, so we can make arrangements to refill the prescription for you if we determine it is necessary**

## 2018-07-19 NOTE — OP NOTE
Patient Name: Elenita  : 1940  MRN: [id number]      DATE of SURGERY: 2018    SURGEON: Waldemar Serrano MD    ASSISTANT: Td Garcia MD    Preoperative Diagnosis:  Right hip osteoarthritis    Postoperative Diagnosis:  Right hip osteoarthritis     Procedure Performed: Right anterior total hip arthroplasty    Implants: DePuy Corail System with the following components:    50 mm Gription acetabular shell multihole   32 mm [place] polyethylene acetabular liner   32 mm diameter +1 ceramic femoral head   Size 12 cementless femoral press-fit stem   [place]ret cancellus 6.5 mm screws, 25 mm ×1 and 15 mm ×1    Anesthesia Used: GETA    Operative Indications: 77 y.o. female with progressive arthritis of the right hip, failing conservative care.  Due to failed conservative measures, it was ultimately elected to move forward with the above procedure.  Risk include, but are not limited to, bleeding, infection, pain, damage to neurovascular structures, leg length inequality, hip dislocation, blood clots, intraoperative death. Risks, benefits, and alternatives were discussed and the patient wished to proceed.    Estimated Blood Loss: 200 mL    Drains: None    Specimens: None    Complications: None    Procedure In Detail:  After informed consent, the patient was given 1 g of Ancef, 1 g of Tranxene acid and underwent anesthetic induction.  Full catheter was inserted.  She was placed on the [place] table.  The right hip was then prepped and draped in the usual sterile fashion.  A 10 cm incision starting lateral to the ASIS extending it distally just anterior to the greater trochanter.  The TFL fascia was then incised.  The TFL was taken laterally, while the sartorius and rectus were taken medially.  The ascending branch of the lateral femoral circumflex vessels were identified and cauterized.  An anterior capsulotomy was then performed.  The neck resection was made at the equator of the femoral head and in the saddle  of the neck.  A wafer osteotomy was made in the retained neck portion of the femoral head to facilitate extraction.  Corkscrew extractor was advanced into the retained femoral head and the femoral head was disarticulated from the acetabulum.  Next the acetabulum was exposed.  The femoral head was sized and the back table noted to be 44 mm in diameter.  Starting with a 42 mm reamer, sequential reaming was reamed up to a 50 mm.  A trial 50 mm shell was noted to fit nicely.  Irrigation was performed with a liter of irrigation.  A final size 50 Gription [place] shell was press-fit in about 40° of abduction and 20° of anteversion with excellent purchase.  2 posterior superior screw hole trajectories were then drilled and measured and noting a 25 mm screw be placed with excellent purchase in the most cephalad orientation with a 15 mm screw placed in the more lateral position noting to gain excellent purchase.  The shell was then copiously irrigated one final time before placement of a 32 mm liner that was locked in the acetabular shell after impaction and noted to be well fixated.    At this point, the leg was externally rotated and extended.  Continued capsular excision was performed to help with exposure.  Under fluoroscopic guidance the canal was entered with a canal finder.  Sequential broaching was then performed up to a size 12 broach followed by the use of a calcar planer.  A trial reduction was performed.  C-arm fluoroscopy images were then taken of the pelvis in the AP trajectory to include visualization of the bilateral lesser trochanters to ensure appropriate restoration of leg length.  Finally, a final size 12 CORAIL stem with 135° neck shaft angle was press-fit down the proximal femoral canal.  A final 32 mm +1 ceramic head was placed on the Butterfield taper.  It was impacted into place and noted to be firmly fixated.  The hip was reduced with excellent stability.  Anterior maneuvers including hyperextension with  external rotation and abduction were performed with no evidence of anterior instability.  Fluoroscopic views revealed excellent alignment of the femoral aspect Of components.  The wound was then copiously irrigated with 2 L of irrigation.  EFL fascia was then closed with an 0 Vicryl suture.  2-0 Vicryl suture was then used for the subcutaneous closure of the tissues.  A final 3-0 Vicryl and a running subcuticular fashion was placed.  Finally, a pernio Dermabond adhesive skin dressing was applied to the incision without tension.  A Mepilex dressing was applied thereafter.     The patient was awakened by anesthesia transported to the PACU in stable condition.      POSTOPERATIVE PLAN: Admit inpatient for monitoring, PT/OT, 6 weeks of DVT prophylaxis, and IV antibiotics for 24 hrs.  Weight bear as tolerated with anterior hip precautions.    Electronically signed by Waldemar Serrano MD on 7/19/2018 at 3:23 PM

## 2018-07-19 NOTE — BRIEF OP NOTE
TOTAL HIP ARTHROPLASTY ANTERIOR  Progress Note    Ashley Maher  7/19/2018    Pre-op Diagnosis:   RIGHT HIP OSTEOARTHRITIS       Post-Op Diagnosis Codes:   SAME    Procedure/CPT® Codes:      Procedure(s):  RIGHT TOTAL HIP REPLACEMENT - ANTERIOR    Surgeon(s):  MD Td Santana MD    Anesthesia: Choice    Staff:   Circulator: Kelly Cook RN  Scrub Person: Valeriy Love  Vendor Representative: Gautam Yuen  Assistant: Terell Vance  Other: Ankur Yates    Estimated Blood Loss: 200ml    Urine Voided: 300 mL    Specimens:                None      Drains:   Urethral Catheter Silicone 16 Fr. (Active)       Findings: Right hip osteoarthritis    Complications: None      Waldemar Serrano MD     Date: 7/19/2018  Time: 3:22 PM

## 2018-07-19 NOTE — ANESTHESIA PROCEDURE NOTES
Peripheral Block    Start time: 7/19/2018 12:37 PM  Stop time: 7/19/2018 12:39 PM  Performed by  CRNA: EARNESTINE PAGE  Assisted by: ADAM CARDENAS  Preanesthetic Checklist  Completed: patient identified, site marked, surgical consent, pre-op evaluation, timeout performed, IV checked, risks and benefits discussed and monitors and equipment checked  Prep:  Sterile barriers:cap, gloves and sterile barriers  Prep: ChloraPrep  Patient monitoring: blood pressure monitoring, continuous pulse oximetry and EKG  Procedure  Sedation:yes  Performed under: MAC  Guidance:ultrasound guided  ULTRASOUND INTERPRETATION.  Using ultrasound guidance a 20 G gauge needle was placed in close proximity to the femoral nerve, at which point, under ultrasound guidance anesthetic was injected in the area of the nerve and spread of the anesthesia was seen on ultrasound in close proximity thereto.  There were no abnormalities seen on ultrasound; a digital image was taken; and the patient tolerated the procedure with no complications. Images:still images obtained    Laterality:right  Block Type:fascia iliaca compartment  Injection Technique:single-shot  Needle Type:echogenic    Medications  Local Injected:ropivacaine 0.5% and ropivacaine 0.2% Local Amount Injected:40mL  Post Assessment  Injection Assessment: negative aspiration for heme, no paresthesia on injection and incremental injection  Patient Tolerance:comfortable throughout block  Complications:no

## 2018-07-19 NOTE — ANESTHESIA PREPROCEDURE EVALUATION
Anesthesia Evaluation     Nursing notes reviewed   no history of anesthetic complications:  NPO Solid Status: > 8 hours  NPO Liquid Status: > 8 hours           Airway   Mallampati: I  TM distance: >3 FB  No difficulty expected  Dental          Pulmonary - negative pulmonary ROS and normal exam   Cardiovascular - normal exam    ECG reviewed  Rhythm: regular  Rate: normal    (+) hypertension well controlled,       Neuro/Psych  (+) numbness,       ROS Comment: Left pinky finger numb since fall.  GI/Hepatic/Renal/Endo - negative ROS     Musculoskeletal     Abdominal  - normal exam   Substance History      OB/GYN negative ob/gyn ROS         Other   (+) arthritis                     Anesthesia Plan    ASA 2     general     intravenous induction   Anesthetic plan and risks discussed with patient.  Use of blood products discussed with patient  Consented to blood products.

## 2018-07-19 NOTE — ANESTHESIA POSTPROCEDURE EVALUATION
Patient: Ashley Maher    Procedure Summary     Date:  07/19/18 Room / Location:  [facility] OR  /  [facility] OR    Anesthesia Start:  1255 Anesthesia Stop:  1517    Procedure:  RIGHT TOTAL HIP REPLACEMENT (Right Hip) Diagnosis:  (RIGHT HIP OSTEOARTHRITIS)    Surgeon:  Waldemar Serrano MD Provider:  Dustin Peace CRNA    Anesthesia Type:  general ASA Status:  2          Anesthesia Type: general  Last vitals  BP   167/80 (07/19/18 1615)   Temp   97.8 °F (36.6 °C) (07/19/18 1600)   Pulse   60 (07/19/18 1615)   Resp   14 (07/19/18 1615)     SpO2   100 % (07/19/18 1615)     Post Anesthesia Care and Evaluation    PONV Status: none  Comments: Patient d/c from PACU prior to anes eval based on Robbie score.  Please see RN notes for details of d/c criteria.    Blood pressure 162/74, pulse 62, temperature 97.3 °F (36.3 °C), temperature source Temporal Artery , resp. rate 20, SpO2 100 %.

## 2018-07-19 NOTE — CONSULTS
[facility] Medicine Consult Note    Date of Admission: 7/19/2018  Date of Consult: 07/19/18    Primary Care Physician: DANIA Romero  Referring Physician: Waldemar Serrano M.D.    Chief complaint/Reason for consultation: Hypertension, anemia    History of Present Illness  Ashley Maher is a very pleasant 77-year-old  female with a past medical history of arthritis, hypertension, vitamin D deficiency and chronic right eye pressure without glaucoma diagnosis.  Patient was admitted for planned right heel.  Replacement.  Hospitalists have been consulted to monitor hypertension and anemia, treat as appropriate.  Currently patient is sitting up eating dinner.  She states the food is really bad.  She states she had some pain but was provided pain medication and currently feels a little dopey.  She reports a [facility] daughter at bedside states ER was negative for acute fractures or injuries.  Currently patient is stable.  We will continue to follow.      Review of Systems   Constitutional: Positive for activity change. Negative for appetite change, fatigue and fever.   HENT: Negative for congestion, mouth sores, rhinorrhea, sinus pressure and trouble swallowing.    Respiratory: Negative for cough, chest tightness, shortness of breath and wheezing.    Cardiovascular: Negative for chest pain, palpitations and leg swelling.   Gastrointestinal: Negative for abdominal distention, abdominal pain, constipation, diarrhea, nausea and vomiting.   Genitourinary: Negative for difficulty urinating, dysuria and frequency.   Musculoskeletal: Positive for arthralgias (chronic right hip pain, now post op pain) and gait problem. Negative for myalgias.   Neurological: Negative for dizziness, weakness and light-headedness.   Psychiatric/Behavioral: Negative for agitation and sleep disturbance. The patient is not  nervous/anxious.         Past Medical History:   Past Medical History:   Diagnosis Date   • Arthritis    • Cataract     BILAT   • Electrolyte imbalance    • Hypertension    • Vitamin D deficiency        Past Surgical History:   Past Surgical History:   Procedure Laterality Date   • ANTERIOR AND POSTERIOR VAGINAL REPAIR     • APPENDECTOMY     • COLONOSCOPY     • EXCISION LESION      RIGHT SHOULDER ABCESSES   • EYE SURGERY     • HYSTERECTOMY     • TOTAL HIP ARTHROPLASTY Right 7/19/2018    Procedure: RIGHT TOTAL HIP REPLACEMENT;  Surgeon: Waldemar Serrano MD;  Location: [facility];  Service: Orthopedics       Code Status: Full, if unable to speak for herself her daughter will speak for her    Family History: family history includes Alzheimer's disease in her sister; Aneurysm in her father; Cancer in her brother, brother, sister, and sister; Diabetes in her brother and father; Heart disease in her brother, brother, and father.    Social History:  reports that she has quit smoking. Her smoking use included Cigarettes. She has a 15.00 pack-year smoking history. She has never used smokeless tobacco. She reports that she does not drink alcohol or use drugs.    Allergies:   Allergies   Allergen Reactions   • Sulfa Antibiotics Other (See Comments)     EXTREMELY LOW BLOOD PRESSURE   • Pyridium [Phenazopyridine Hcl] Swelling     RASH WITH SWELLING FACE   • Tramadol Dizziness     STATES SHE COULDN'T THINK   • Trimethoprim Unknown (See Comments)     RECEIVED FROM [facility], PT UNSURE OF REACTION   • Paba Derivatives Rash     INGREDIENT IN SUNSCREENS       Home Medications:   Prior to Admission medications    Medication Sig Start Date End Date Taking? Authorizing Provider   bimatoprost (LUMIGAN) 0.01 % ophthalmic drops Administer 1 drop to the right eye Every Night.   Yes Historical Provider, MD   Cholecalciferol (VITAMIN D3) 5000 units capsule capsule Take 5,000 Units by mouth Daily.   Yes Historical Provider, MD    HYDROcodone-acetaminophen (NORCO) 7.5-325 MG per tablet Take 1 tablet by mouth 2 (Two) Times a Day. TAKES 1/2 TAB   Yes Historical Provider, MD   lisinopril (PRINIVIL,ZESTRIL) 40 MG tablet Take 40 mg by mouth Daily.   Yes Historical Provider, MD   montelukast (SINGULAIR) 10 MG tablet Take 10 mg by mouth Every Night.   Yes Historical Provider, MD       Scheduled Medications    ceFAZolin 1 g Intravenous Once   ceFAZolin 2 g Intravenous Q8H   latanoprost 1 drop Right Eye Nightly   lisinopril 40 mg Oral Daily   montelukast 10 mg Oral Nightly   polyethylene glycol 17 g Oral Daily   [START ON 7/20/2018] rivaroxaban 10 mg Oral Daily   vitamin D3 5,000 Units Oral Daily       Pertinent Data:   Lab Results (last 72 hours)     Procedure Component Value Units Date/Time    Hemoglobin & Hematocrit, Blood [[id number]]  (Abnormal) Collected:  07/19/18 1701    Specimen:  Blood Updated:  07/19/18 1725     Hemoglobin 10.1 (L) g/dL      Hematocrit 29.6 (L) %         Imaging Results (last 24 hours)     Procedure Component Value Units Date/Time    XR Hip With or Without Pelvis 2 - 3 View Right [[id number]] Collected:  07/19/18 1446     Updated:  07/19/18 1624    Narrative:       EXAMINATION:  XR HIP W OR WO PELVIS 2-3 VIEW RIGHT-  7/19/2018 12:55 PM  CDT     HISTORY: Right hip arthroplasty.      COMPARISON: No comparison study.     TECHNIQUE: 4 spot images were obtained.     FLUOROSCOPY TIME: 25 seconds.      FINDINGS: Images demonstrate placement of a right hip prosthesis. There  is no fracture or other complication noted. Bone detail is limited.       Impression:       Operative images, as described.  This report was finalized on 07/19/2018 14:47 by Dr. Brigido Avendano MD.    FL C Arm During Surgery [[id number]] Collected:  07/19/18 1446     Updated:  07/19/18 1624    Narrative:       EXAMINATION:  XR HIP W OR WO PELVIS 2-3 VIEW RIGHT-  7/19/2018 12:55 PM  CDT     HISTORY: Right hip arthroplasty.      COMPARISON: No comparison study.      TECHNIQUE: 4 spot images were obtained.     FLUOROSCOPY TIME: 25 seconds.      FINDINGS: Images demonstrate placement of a right hip prosthesis. There  is no fracture or other complication noted. Bone detail is limited.       Impression:       Operative images, as described.  This report was finalized on 07/19/2018 14:47 by Dr. Brigido Avendano MD.    XR Hip With or Without Pelvis 2 - 3 View Right [[id number]] Collected:  07/19/18 1553     Updated:  07/19/18 1557    Narrative:       EXAMINATION: XR HIP W OR WO PELVIS 2-3 VIEW RIGHT- 7/19/2018 3:53 PM CDT     HISTORY: Postop hip arthroplasty     COMPARISON: None     FINDINGS:  There has been resection of the right femoral head and neck with a right  hip arthroplasty in place. The prosthetic components appear  appropriately aligned. There is no evidence of acute fracture. Soft  tissue air and edema are compatible with the immediate postoperative  state.       Impression:       Satisfactory appearance of right hip prosthesis.  This report was finalized on 07/19/2018 15:53 by Dr. Benedict Pina MD.          Temp:  [97.3 °F (36.3 °C)-98.3 °F (36.8 °C)] 97.9 °F (36.6 °C)  Heart Rate:  [53-68] 59  Resp:  [14-20] 20  BP: (137-177)/(72-84) 158/74    Physical Exam   Constitutional: She is oriented to person, place, and time. She appears well-developed and well-nourished. No distress.   HENT:   Head: Normocephalic and atraumatic.   [place]   Eyes: Pupils are equal, round, and reactive to light. Conjunctivae and EOM are normal. No scleral icterus.   Neck: Normal range of motion. Neck supple. No JVD present. No tracheal deviation present.   Cardiovascular: Normal rate, regular rhythm, normal heart sounds and intact distal pulses.  Exam reveals no gallop.    No murmur heard.  Pulmonary/Chest: Effort normal and breath sounds normal. No respiratory distress. She has no wheezes. She has no rales.   Abdominal: Soft. Bowel sounds are normal. She exhibits no distension. There is no  tenderness. There is no guarding.   Musculoskeletal: Normal range of motion. She exhibits no edema.   Neurological: She is alert and oriented to person, place, and time.   Skin: Skin is warm and dry. No rash noted. She is not diaphoretic. No erythema. No pallor.   Psychiatric: She has a normal mood and affect. Her behavior is normal.   Vitals reviewed.      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 Home antihypertensive resumed, monitor B/P closely  2. Monitor H/H  3. Monitor BMP      I discussed the patients findings and my recommendations with: EFREN Vieyra MD  Time spent: 25 minutes    DANIA Siddiqui  07/19/18  5:30 PM    I personally evaluated and examined the patient in conjunction with DANIA Azevedo and agree with the assessment, treatment plan, and disposition of the patient as recorded by her. My history, exam, and further recommendations are:     - Resume home medications  - IVF NS 50 ml/hr for 10 hours for hyponatremia, hypovolemic    Ankur Vieyra MD  07/19/18  9:52 PM

## 2018-07-20 VITALS
HEART RATE: 76 BPM | RESPIRATION RATE: 18 BRPM | DIASTOLIC BLOOD PRESSURE: 62 MMHG | OXYGEN SATURATION: 97 % | WEIGHT: 124.3 LBS | TEMPERATURE: 98.1 F | BODY MASS INDEX: 22.87 KG/M2 | SYSTOLIC BLOOD PRESSURE: 127 MMHG | HEIGHT: 62 IN

## 2018-07-20 LAB
ANION GAP SERPL CALCULATED.3IONS-SCNC: 8 MMOL/L (ref 4–13)
BUN BLD-MCNC: 8 MG/DL (ref 5–21)
BUN/CREAT SERPL: 14.8 (ref 7–25)
CALCIUM SPEC-SCNC: 8.5 MG/DL (ref 8.4–10.4)
CHLORIDE SERPL-SCNC: 98 MMOL/L (ref 98–110)
CO2 SERPL-SCNC: 26 MMOL/L (ref 24–31)
CREAT BLD-MCNC: 0.54 MG/DL (ref 0.5–1.4)
GFR SERPL CREATININE-BSD FRML MDRD: 109 ML/MIN/1.73
GLUCOSE BLD-MCNC: 105 MG/DL (ref 70–100)
HCT VFR BLD AUTO: 28.1 % (ref 37–47)
HGB BLD-MCNC: 9.7 G/DL (ref 12–16)
POTASSIUM BLD-SCNC: 3.9 MMOL/L (ref 3.5–5.3)
SODIUM BLD-SCNC: 132 MMOL/L (ref 135–145)

## 2018-07-20 PROCEDURE — 85014 HEMATOCRIT: CPT | Performed by: ORTHOPAEDIC SURGERY

## 2018-07-20 PROCEDURE — G8988 SELF CARE GOAL STATUS: HCPCS

## 2018-07-20 PROCEDURE — 80048 BASIC METABOLIC PNL TOTAL CA: CPT | Performed by: ORTHOPAEDIC SURGERY

## 2018-07-20 PROCEDURE — 85018 HEMOGLOBIN: CPT | Performed by: ORTHOPAEDIC SURGERY

## 2018-07-20 PROCEDURE — 97110 THERAPEUTIC EXERCISES: CPT

## 2018-07-20 PROCEDURE — 97116 GAIT TRAINING THERAPY: CPT

## 2018-07-20 PROCEDURE — 97166 OT EVAL MOD COMPLEX 45 MIN: CPT

## 2018-07-20 PROCEDURE — 25010000003 CEFAZOLIN PER 500 MG: Performed by: ORTHOPAEDIC SURGERY

## 2018-07-20 PROCEDURE — G8987 SELF CARE CURRENT STATUS: HCPCS

## 2018-07-20 PROCEDURE — G8978 MOBILITY CURRENT STATUS: HCPCS | Performed by: PHYSICAL THERAPIST

## 2018-07-20 PROCEDURE — 97161 PT EVAL LOW COMPLEX 20 MIN: CPT | Performed by: PHYSICAL THERAPIST

## 2018-07-20 PROCEDURE — G8979 MOBILITY GOAL STATUS: HCPCS | Performed by: PHYSICAL THERAPIST

## 2018-07-20 RX ORDER — OXYCODONE AND ACETAMINOPHEN 10; 325 MG/1; MG/1
1 TABLET ORAL EVERY 6 HOURS PRN
Qty: 60 TABLET | Refills: 0 | Status: SHIPPED | OUTPATIENT
Start: 2018-07-20 | End: 2018-07-29

## 2018-07-20 RX ORDER — ONDANSETRON 4 MG/1
4 TABLET, FILM COATED ORAL EVERY 6 HOURS PRN
Qty: 20 TABLET | Refills: 0 | Status: SHIPPED | OUTPATIENT
Start: 2018-07-20 | End: 2022-07-24

## 2018-07-20 RX ORDER — PSEUDOEPHEDRINE HCL 30 MG
100 TABLET ORAL 2 TIMES DAILY PRN
Qty: 60 CAPSULE | Refills: 1 | Status: SHIPPED | OUTPATIENT
Start: 2018-07-20 | End: 2022-07-24

## 2018-07-20 RX ADMIN — CHOLECALCIFEROL CAP 125 MCG (5000 UNIT) 5000 UNITS: 125 CAP at 08:35

## 2018-07-20 RX ADMIN — MILK OF MAGNESIA 10 ML: 2400 CONCENTRATE ORAL at 05:44

## 2018-07-20 RX ADMIN — POLYETHYLENE GLYCOL (3350) 17 G: 17 POWDER, FOR SOLUTION ORAL at 08:35

## 2018-07-20 RX ADMIN — CEFAZOLIN 2 G: 330 INJECTION, POWDER, FOR SOLUTION INTRAMUSCULAR; INTRAVENOUS at 04:35

## 2018-07-20 RX ADMIN — OXYCODONE HYDROCHLORIDE AND ACETAMINOPHEN 1 TABLET: 10; 325 TABLET ORAL at 11:23

## 2018-07-20 RX ADMIN — OXYCODONE HYDROCHLORIDE 5 MG: 5 TABLET ORAL at 05:44

## 2018-07-20 RX ADMIN — LISINOPRIL 40 MG: 20 TABLET ORAL at 08:35

## 2018-07-20 RX ADMIN — RIVAROXABAN 10 MG: 10 TABLET, FILM COATED ORAL at 08:35

## 2018-07-20 NOTE — PLAN OF CARE
Problem: Patient Care Overview  Goal: Plan of Care Review  Outcome: Ongoing (interventions implemented as appropriate)   07/20/18 7646   Coping/Psychosocial   Plan of Care Reviewed With patient   Plan of Care Review   Progress improving   OTHER   Outcome Summary OT evaluation completed. Pt demo need for skilled OT services due to requiring mod A with LB dressing, and CGA/min A with bed mobility and functional transfers. Pt completed functional mobility in room and hallway with CGA. Pt is limited with LB dressing due to decreased ROM and R hip pain. Pt is also limited due to decreased strength, balance, and endurace. OT will cont to work with pt to increase her I with ADLs and functional mobility. It is recommended pt discharge home with HH OT and PT.
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
## 2018-07-20 NOTE — PROGRESS NOTES
Start   Ordered   07/19/18 5831  Inpatient Case Management  Consult Once    Complete   Provider: (Not yet assigned)   Question: Reason for Consult? Answer: Patient may require placement           Received consult above. SW will assess patient and follow for PT/OT evaluations.

## 2018-07-20 NOTE — THERAPY EVALUATION
Acute Care - Physical Therapy Initial Evaluation  [facility]     Patient Name: Ashley Maher  : 1940  MRN: [id number]  Today's Date: 2018   Onset of Illness/Injury or Date of Surgery: 18  Date of Referral to PT: 18  Referring Physician: Dr. Serrano      Admit Date: 2018    Visit Dx:     ICD-10-CM ICD-9-CM   1. Impaired mobility and ADLs Z74. 799.89   2. Impaired mobility Z74. 799.89     Patient Active Problem List   Diagnosis   • Osteoarthritis resulting from right hip dysplasia     Past Medical History:   Diagnosis Date   • Arthritis    • Cataract     BILAT   • Electrolyte imbalance    • Hypertension    • Vitamin D deficiency      Past Surgical History:   Procedure Laterality Date   • ANTERIOR AND POSTERIOR VAGINAL REPAIR     • APPENDECTOMY     • COLONOSCOPY     • EXCISION LESION      RIGHT SHOULDER ABCESSES   • EYE SURGERY     • HYSTERECTOMY     • TOTAL HIP ARTHROPLASTY Right 2018    Procedure: RIGHT TOTAL HIP REPLACEMENT;  Surgeon: Waldemar Serrano MD;  Location: [facility];  Service: Orthopedics        PT ASSESSMENT (last 12 hours)      Physical Therapy Evaluation     Row Name 18 0800          PT Evaluation Time/Intention    Subjective Information complains of;pain;numbness   numbness right below incision site  -MS     Document Type evaluation  -MS     Mode of Treatment physical therapy;concurrent therapy  -MS     Row Name 18 0800          General Information    Patient Profile Reviewed? yes  -MS     Onset of Illness/Injury or Date of Surgery 18  -MS     Referring Physician Dr. Serrano  -MS     Patient Observations alert;cooperative;agree to therapy  -MS     Patient/Family Observations daughter present in room  -MS     General Observations of Patient pt fowlers in bed, awake and in no apparent distress  -MS     Prior Level of Function independent:;all household mobility;ADL's  -MS     Equipment Currently Used at Home bath bench;cane, straight  -MS      Pertinent History of Current Functional Problem s/p R anterior THR  -MS     Existing Precautions/Restrictions fall;right;hip, anterior  -MS     Limitations/Impairments hearing   face the patient when speaking to her  -MS     Risks Reviewed patient:;LOB;nausea/vomiting;dizziness;increased discomfort  -MS     Benefits Reviewed patient:;improve function;increase independence;increase strength;increase balance;decrease pain;increase knowledge  -MS     Barriers to Rehab physical barrier  -MS     Row Name 07/20/18 0800          Home Main Entrance    Number of Stairs, Main Entrance two  -MS     Surface of Stairs, Main Entrance --   platform style  -MS     Stair Railings, Main Entrance railings on both sides of stairs  -MS     Landing, Stairs, Main Entrance adequate turning radius;railings present  -MS     Row Name 07/20/18 0800          Cognitive Assessment/Intervention- PT/OT    Orientation Status (Cognition) oriented x 4  -MS     Follows Commands (Cognition) WNL  -MS     Row Name 07/20/18 0800          Safety Issues, Functional Mobility    Impairments Affecting Function (Mobility) pain  -MS     Row Name 07/20/18 0800          Mobility Assessment/Treatment    Extremity Weight-bearing Status right lower extremity  -MS     Right Lower Extremity (Weight-bearing Status) weight-bearing as tolerated (WBAT)  -MS     Row Name 07/20/18 0800          Bed Mobility Assessment/Treatment    Bed Mobility Assessment/Treatment supine-sit;scooting/bridging  -MS     Scooting/Bridging [place] (Bed Mobility) minimum assist (75% patient effort);verbal cues;nonverbal cues (demo/gesture)  -MS     Supine-Sit [place] (Bed Mobility) minimum assist (75% patient effort);verbal cues;nonverbal cues (demo/gesture)  -MS     Assistive Device (Bed Mobility) bed rails  -MS     Row Name 07/20/18 0800          Transfer Assessment/Treatment    Transfer Assessment/Treatment stand-sit transfer;sit-stand transfer  -MS     Sit-Stand [place]  (Transfers) contact guard;verbal cues;nonverbal cues (demo/gesture)  -MS     Stand-Sit [place] (Transfers) contact guard;verbal cues;nonverbal cues (demo/gesture)  -MS     Row Name 07/20/18 0800          Sit-Stand Transfer    Assistive Device (Sit-Stand Transfers) walker, front-wheeled  -MS     Row Name 07/20/18 0800          Stand-Sit Transfer    Assistive Device (Stand-Sit Transfers) walker, front-wheeled  -MS     Row Name 07/20/18 0800          Gait/Stairs Assessment/Training    [place] Level (Gait) contact guard;verbal cues;nonverbal cues (demo/gesture)  -MS     Assistive Device (Gait) walker, front-wheeled  -MS     Distance in Feet (Gait) 35  -MS     Row Name 07/20/18 0800          General ROM    GENERAL ROM COMMENTS R hip flexion impaired due to pain  -MS     Row Name 07/20/18 0800          General Assessment (Manual Muscle Testing)    Comment, General Manual Muscle Testing (MMT) Assessment R LE limited due to pain  -MS     Row Name 07/20/18 0800          Sensory Assessment/Intervention    Sensory General Assessment --   numbness below incision  -MS     Row Name 07/20/18 0800          Pain Assessment    Additional Documentation Pain Scale: Numbers Pre/Post-Treatment (Group)  -MS     Row Name 07/20/18 0800          Pain Scale: Numbers Pre/Post-Treatment    Pain Scale: Numbers, Pretreatment 6/10  -MS     Pain Scale: Numbers, Post-Treatment 8/10  -MS     Pain Location - Side Right  -MS     Pain Location hip  -MS     Pain Intervention(s) Medication (See MAR);Ambulation/increased activity;Repositioned  -MS     Row Name 07/20/18 0800          Health Promotion    Additional Documentation Plan of Care Review (Group)  -MS     Row Name             Wound 07/19/18 1347 Right hip incision    Wound - Properties Group Date first assessed: 07/19/18  -HT Time first assessed: 1347  -HT Side: Right  -HT Location: hip  -HT Type: incision  -HT    Row Name 07/20/18 0800          Plan of Care Review    Plan of Care  Reviewed With patient  -MS     Row Name 07/20/18 0800          Physical Therapy Clinical Impression    Date of Referral to PT 07/19/18  -MS     Patient/Family Goals Statement (PT Clinical Impression) return home at discharge  -MS     Criteria for Skilled Interventions Met (PT Clinical Impression) yes;treatment indicated  -MS     Pathology/Pathophysiology Noted (Describe Specifically for Each System) musculoskeletal  -MS     Impairments Found (describe specific impairments) gait, locomotion, and balance;ROM;muscle performance  -MS     Rehab Potential (PT Clinical Summary) good, to achieve stated therapy goals  -MS     Predicted Duration of Therapy (PT) until discharge  -MS     Care Plan Review (PT) evaluation/treatment results reviewed;care plan/treatment goals reviewed;risks/benefits reviewed;current/potential barriers reviewed;patient/other agree to care plan  -MS     Care Plan Review, Other Participant (PT Clinical Impression) daughter  -MS     Row Name 07/20/18 0800          Physical Therapy Goals    Bed Mobility Goal Selection (PT) bed mobility, PT goal 1  -MS     Transfer Goal Selection (PT) transfer, PT goal 1  -MS     Gait Training Goal Selection (PT) gait training, PT goal 1  -MS     Stairs Goal Selection (PT) stairs, PT goal 1  -MS     Additional Documentation Stairs Goal Selection (PT) (Row)  -MS     Row Name 07/20/18 0800          Bed Mobility Goal 1 (PT)    Activity/Assistive Device (Bed Mobility Goal 1, PT) bed mobility activities, all  -MS     [place] Level/Cues Needed (Bed Mobility Goal 1, PT) independent  -MS     Time Frame (Bed Mobility Goal 1, PT) long term goal (LTG);by discharge  -MS     Progress/Outcomes (Bed Mobility Goal 1, PT) goal ongoing  -MS     Row Name 07/20/18 0800          Transfer Goal 1 (PT)    Activity/Assistive Device (Transfer Goal 1, PT) sit-to-stand/stand-to-sit;bed-to-chair/chair-to-bed  -MS     [place] Level/Cues Needed (Transfer Goal 1, PT) independent  -MS     Time  Frame (Transfer Goal 1, PT) long term goal (LTG);by discharge  -MS     Progress/Outcome (Transfer Goal 1, PT) goal ongoing  -MS     Row Name 07/20/18 0800          Gait Training Goal 1 (PT)    Activity/Assistive Device (Gait Training Goal 1, PT) gait (walking locomotion);assistive device use;backward stepping;decrease fall risk;improve balance and speed;increase endurance/gait distance;normalize weight shifts;walker, rolling  -MS     [place] Level (Gait Training Goal 1, PT) conditional independence  -MS     Distance (Gait Goal 1, PT) 200ft  -MS     Time Frame (Gait Training Goal 1, PT) long term goal (LTG);by discharge  -MS     Progress/Outcome (Gait Training Goal 1, PT) goal ongoing  -MS     Row Name 07/20/18 0800          Stairs Goal 1 (PT)    Activity/Assistive Device (Stairs Goal 1, PT) ascending stairs;descending stairs;walker, rolling;assistive device use  -MS     [place] Level/Cues Needed (Stairs Goal 1, PT) conditional independence  -MS     Number of Stairs (Stairs Goal 1, PT) 2 platform steps   -MS     Time Frame (Stairs Goal 1, PT) long term goal (LTG);by discharge  -MS     Progress/Outcome (Stairs Goal 1, PT) goal ongoing  -MS     Row Name 07/20/18 0800          Positioning and Restraints    Post Treatment Position chair  -MS     In Chair sitting;call light within reach;encouraged to call for assist;with family/caregiver  -MS     Row Name 07/20/18 0800          Living Environment    Home Accessibility stairs to enter home;tub/shower is not walk in  -MS       User Key  (r) = Recorded By, (t) = Taken By, (c) = Cosigned By    Initials Name Provider Type    MS Teressa Jasmine, PT, DPT, NCS Physical Therapist    HT Kelly Cook, RN Registered Nurse          Physical Therapy Education     Title: PT OT SLP Therapies (Active)     Topic: Physical Therapy (Active)     Point: Mobility training (Done)    Learning Progress Summary     Learner Status Readiness Method Response Comment Documented by     Patient Done Acceptance E VU role of PT in her care MS 07/20/18 0909                      User Key     Initials Effective Dates Name Provider Type Discipline    MS 06/19/18 -  Teressa Jasmine, PT, DPT, NCS Physical Therapist PT                PT Recommendation and Plan  Anticipated Discharge Disposition (PT): home with assist, home with OP services  Planned Therapy Interventions (PT Eval): balance training, bed mobility training, gait training, home exercise program, patient/family education, strengthening, transfer training  Therapy Frequency (PT Clinical Impression): 2 times/day  Outcome Summary/Treatment Plan (PT)  Anticipated Equipment Needs at Discharge (PT): front wheeled walker  Anticipated Discharge Disposition (PT): home with assist, home with OP services  Plan of Care Reviewed With: patient  Progress: improving  Outcome Summary: PT evaluation completed. The patient requires the assist of one to complete mobilty tasks at this time. She will need to practice steps prior to being discharged home.           Outcome Measures     Row Name 07/20/18 0801 07/20/18 0800          How much help from another person do you currently need...    Turning from your back to your side while in flat bed without using bedrails?  -- 3  -MS     Moving from lying on back to sitting on the side of a flat bed without bedrails?  -- 3  -MS     Moving to and from a bed to a chair (including a wheelchair)?  -- 3  -MS     Standing up from a chair using your arms (e.g., wheelchair, bedside chair)?  -- 3  -MS     Climbing 3-5 steps with a railing?  -- 2  -MS     To walk in hospital room?  -- 3  -MS     AM-PAC 6 Clicks Score  -- 17  -MS        How much help from another is currently needed...    Putting on and taking off regular lower body clothing? 2  -CS  --     Bathing (including washing, rinsing, and drying) 3  -CS  --     Toileting (which includes using toilet bed pan or urinal) 3  -CS  --     Putting on and taking off regular upper  body clothing 4  -CS  --     Taking care of personal grooming (such as brushing teeth) 4  -CS  --     Eating meals 4  -CS  --     Score 20  -CS  --        Functional Assessment    Outcome Measure Options AM-PAC 6 Clicks Daily Activity (OT)  -CS AM-PAC 6 Clicks Basic Mobility (PT)  -MS       User Key  (r) = Recorded By, (t) = Taken By, (c) = Cosigned By    Initials Name Provider Type    MS Teressa Jasmine PT, DPT, NCS Physical Therapist    CS Shanna Gary OTR/L Occupational Therapist           Time Calculation:         PT Charges     Row Name 07/20/18 0911             Time Calculation    Start Time 0800  -MS      Stop Time 0823  -MS      Time Calculation (min) 23 min  -MS      PT Received On 07/20/18  -MS      PT Goal Re-Cert Due Date 07/30/18  -MS        User Key  (r) = Recorded By, (t) = Taken By, (c) = Cosigned By    Initials Name Provider Type    MS Teressa Jasmine PT, DPT, NCS Physical Therapist        Therapy Suggested Charges     Code   Minutes Charges    None           Therapy Charges for Today     Code Description Service Date Service Provider Modifiers Qty    76868825437 HC PT MOBILITY CURRENT 7/20/2018 Teressa Jasmine PT, DPT, NCS GP, CK 1    47098626441 HC PT MOBILITY PROJECTED 7/20/2018 Teressa Jasmine PT, DPT, NCS GP, CI 1    13964846878 HC PT EVAL LOW COMPLEXITY 2 7/20/2018 Teressa Jasmine PT, DPT, NCS GP, KX 1          PT G-Codes  Outcome Measure Options: AM-PAC 6 Clicks Daily Activity (OT)  Score: 17  Functional Limitation: Mobility: Walking and moving around  Mobility: Walking and Moving Around Current Status (): At least 40 percent but less than 60 percent impaired, limited or restricted  Mobility: Walking and Moving Around Goal Status (): At least 1 percent but less than 20 percent impaired, limited or restricted      Teressa Jasmine PT, DPT, NCS  7/20/2018

## 2018-07-20 NOTE — THERAPY DISCHARGE NOTE
Acute Care - Occupational Therapy Discharge Summary  [facility]     Patient Name: Ashley Maher  : 1940  MRN: [id number]    Today's Date: 2018  Onset of Illness/Injury or Date of Surgery: 18    Date of Referral to OT: 18  Referring Physician: Dr. Serrano      Admit Date: 2018        OT Recommendation and Plan    Visit Dx:    ICD-10-CM ICD-9-CM   1. 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 Impaired mobility and ADLs Z74.09 799.89   3. Impaired mobility Z74.09 799.89               Time Calculation- OT     Row Name 18 1420 18 0859          Time Calculation- OT    OT Start Time  -- 0800  -CS     OT Stop Time  -- 0830  -CS     OT Time Calculation (min)  -- 30 min  -CS     OT Received On  -- 18  -CS     OT Goal Re-Cert Due Date  -- 18  -CS        Timed Charges    08930 - Gait Training Minutes  30  -  --       User Key  (r) = Recorded By, (t) = Taken By, (c) = Cosigned By    Initials Name Provider Type     Kalli Collins, PTA Physical Therapy Assistant    ED Gary, OTR/L Occupational Therapist                  OT Rehab Goals     Row Name 18 1509 18 0801          Transfer Goal 1 (OT)    Activity/Assistive Device (Transfer Goal 1, OT) sit-to-stand/stand-to-sit;bed-to-chair/chair-to-bed;toilet;tub;tub bench  -TS sit-to-stand/stand-to-sit;bed-to-chair/chair-to-bed;toilet;tub;tub bench  -CS     [place] Level/Cues Needed (Transfer Goal 1, OT) conditional independence  -TS conditional independence  -CS     Time Frame (Transfer Goal 1, OT) 10 days  -TS 10 days  -CS     Barriers (Transfers Goal 1, OT)  -- .  -CS     Progress/Outcome (Transfer Goal 1, OT) goal not met  -TS goal ongoing  -CS        Dressing Goal 1 (OT)    Activity/Assistive Device (Dressing Goal 1, OT) lower body dressing   AE PRN  -TS lower body dressing   AE PRN  -CS     [place]/Cues Needed (Dressing Goal 1, OT) conditional independence  -TS  conditional independence  -CS     Time Frame (Dressing Goal 1, OT) 10 days  -TS 10 days  -CS     Barriers (Dressing Goal 1, OT)  -- .  -CS     Progress/Outcome (Dressing Goal 1, OT) goal not met  -TS goal ongoing  -CS        Toileting Goal 1 (OT)    Activity/Device (Toileting Goal 1, OT) toileting skills, all;commode  -TS toileting skills, all;commode  -CS     [place] Level/Cues Needed (Toileting Goal 1, OT) conditional independence  -TS conditional independence  -CS     Time Frame (Toileting Goal 1, OT) 10 days  -TS 10 days  -CS     Barriers (Toileting Goal 1, OT)  -- .  -CS     Progress/Outcome (Toileting Goal 1, OT) goal not met  -TS goal ongoing  -CS       User Key  (r) = Recorded By, (t) = Taken By, (c) = Cosigned By    Initials Name Provider Type Discipline    TS Jaylin Ward GARAY/L Occupational Therapy Assistant OT    CS Shanna Gary OTELIAZAR/L Occupational Therapist OT                Outcome Measures     Row Name 07/20/18 0801 07/20/18 0800          How much help from another person do you currently need...    Turning from your back to your side while in flat bed without using bedrails?  -- 3  -MS     Moving from lying on back to sitting on the side of a flat bed without bedrails?  -- 3  -MS     Moving to and from a bed to a chair (including a wheelchair)?  -- 3  -MS     Standing up from a chair using your arms (e.g., wheelchair, bedside chair)?  -- 3  -MS     Climbing 3-5 steps with a railing?  -- 2  -MS     To walk in hospital room?  -- 3  -MS     AM-PAC 6 Clicks Score  -- 17  -MS        How much help from another is currently needed...    Putting on and taking off regular lower body clothing? 2  -CS  --     Bathing (including washing, rinsing, and drying) 3  -CS  --     Toileting (which includes using toilet bed pan or urinal) 3  -CS  --     Putting on and taking off regular upper body clothing 4  -CS  --     Taking care of personal grooming (such as brushing teeth) 4  -CS  --     Eating meals  4  -CS  --     Score 20  -CS  --        Functional Assessment    Outcome Measure Options AM-PAC 6 Clicks Daily Activity (OT)  -CS AM-PAC 6 Clicks Basic Mobility (PT)  -MS       User Key  (r) = Recorded By, (t) = Taken By, (c) = Cosigned By    Initials Name Provider Type    MS Teressa ELIAZAR Jasmine, PT, DPT, NCS Physical Therapist    CS Shanna Gary, OTR/L Occupational Therapist          Therapy Suggested Charges     Code   Minutes Charges    None                 OT Discharge Summary  Reason for Discharge: Discharge from facility  Outcomes Achieved: Refer to plan of care for updates on goals achieved  Discharge Destination: Home with assist      TANISHA Mahoney/REUBEN  7/20/2018

## 2018-07-20 NOTE — PLAN OF CARE
Problem: Patient Care Overview  Goal: Plan of Care Review  Outcome: Ongoing (interventions implemented as appropriate)   07/20/18 0909   Coping/Psychosocial   Plan of Care Reviewed With patient   Plan of Care Review   Progress improving   OTHER   Outcome Summary PT evaluation completed. The patient requires the assist of one to complete mobilty tasks at this time. She will need to practice steps prior to being discharged home.

## 2018-07-20 NOTE — PLAN OF CARE
Problem: Patient Care Overview  Goal: Plan of Care Review  Outcome: Ongoing (interventions implemented as appropriate)   07/20/18 5811   Coping/Psychosocial   Plan of Care Reviewed With patient;family   Plan of Care Review   Progress no change   OTHER   Outcome Summary pt alert and oriented x4. Pt denies pain throughout shift. Pt repositioned as tolerated. Dressing to R hip C/D/I. PPP, VSS, will continue to monitor.       Problem: Fall Risk (Adult)  Goal: Absence of Fall  Outcome: Ongoing (interventions implemented as appropriate)      Problem: Hip Arthroplasty (Total, Partial) (Adult)  Goal: Signs and Symptoms of Listed Potential Problems Will be Absent, Minimized or Managed (Hip Arthroplasty)  Outcome: Ongoing (interventions implemented as appropriate)

## 2018-07-20 NOTE — THERAPY TREATMENT NOTE
Acute Care - Physical Therapy Treatment Note  [facility]     Patient Name: Ashley Maher  : 1940  MRN: [id number]  Today's Date: 2018  Onset of Illness/Injury or Date of Surgery: 18  Date of Referral to : 18  Referring Physician: Dr. Serrano    Admit Date: 2018    Visit Dx:    ICD-10-CM ICD-9-CM   1. 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 Impaired mobility and ADLs Z74.09 799.89   3. 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       Therapy Treatment          Rehabilitation Treatment Summary     Row Name 18 1307             Treatment Time/Intention    Discipline physical therapy assistant  -      Document Type therapy note (daily note)  -2      Subjective Information complains of;pain;weakness  -2      Existing Precautions/Restrictions fall;right;hip, anterior  -2      Recorded by [] Kalli Collins, [facility] 18 1307  [[facility]] Kalli Collins, [facility] 18 1419      Row Name 18 1307             Bed Mobility Assessment/Treatment    Bed Mobility Assessment/Treatment sit-supine  -      Supine-Sit [place] (Bed Mobility) supervision  -      Sit-Supine [place] (Bed Mobility) --   chair  -      Recorded by [] Kalli Collins, [facility] 18 1419      Row Name 18 1307             Transfer Assessment/Treatment    Transfer Assessment/Treatment toilet transfer  -      Recorded by [] Kalli Collins, [facility] 18 1419      Row Name 18 1307             Sit-Stand Transfer    Sit-Stand [place] (Transfers) contact guard;stand by assist  -      Assistive Device (Sit-Stand Transfers) walker, front-wheeled  -      Recorded by [] Kalli Collins, [facility] 18 1419      Row Name 18 1307             Stand-Sit Transfer    Stand-Sit [place] (Transfers) contact guard;stand by assist;verbal cues  -      Recorded by [] Kalli  PRESTON Collins, [facility] 07/20/18 1419      Row Name 07/20/18 1307             Toilet Transfer    Type (Toilet Transfer) sit-stand;stand-sit  -      [place] Level (Toilet Transfer) contact guard;stand by assist;verbal cues  -      Assistive Device (Toilet Transfer) commode;walker, front-wheeled  -AH      Recorded by [] Kalli Collins, [facility] 07/20/18 1419      Row Name 07/20/18 1307             Gait/Stairs Assessment/Training    [place] Level (Gait) contact guard;stand by assist;verbal cues  -      Assistive Device (Gait) walker, front-wheeled  -      Distance in Feet (Gait) 100  -      [place] Level (Stairs) contact guard;stand by assist;verbal cues  -      Handrail Location (Stairs) both sides  -      Number of Steps (Stairs) 5  -AH      Ascending Technique (Stairs) step-to-step  -AH      Descending Technique (Stairs) step-to-step  -AH      Recorded by [] Kalli Collins, [facility] 07/20/18 1419      Row Name 07/20/18 1307             Motor Skills Assessment/Interventions    Additional Documentation Therapeutic Exercise (Group)  -      Recorded by [] Kalli Collins, [facility] 07/20/18 1419      Row Name 07/20/18 1307             Therapeutic Exercise    Lower Extremity (Therapeutic Exercise) gluteal sets;heel slides, right;quad sets, bilateral  -      Lower Extremity Range of Motion (Therapeutic Exercise) hip abduction/adduction, right;ankle dorsiflexion/plantar flexion, bilateral  -      Exercise Type (Therapeutic Exercise) AROM (active range of motion);AAROM (active assistive range of motion)  -      Position (Therapeutic Exercise) supine  -      Sets/Reps (Therapeutic Exercise) 10  -AH      Comment (Therapeutic Exercise) written HEP  -      Recorded by [] Kalli Collins, [facility] 07/20/18 1419      Row Name 07/20/18 1307             Positioning and Restraints    Pre-Treatment Position in bed  -      Post Treatment Position chair  -AH      In Chair sitting;call light within reach;encouraged to  call for assist;with family/caregiver;notified nsg  -AH      Recorded by [] Kalli PRESTON Karina, PTA 07/20/18 1419      Row Name 07/20/18 1307             Pain Scale: Numbers Pre/Post-Treatment    Pain Scale: Numbers, Pretreatment 4/10  -AH      Pain Location - Side Right  -AH      Pain Location hip  -AH      Pain Intervention(s) Medication (See MAR);Repositioned;Ambulation/increased activity  -AH      Recorded by [] Kalli Collins, PTA 07/20/18 1419      Row Name                Wound 07/19/18 1347 Right hip incision    Wound - Properties Group Date first assessed: 07/19/18 [HT] Time first assessed: 1347 [HT] Side: Right [HT] Location: hip [HT] Type: incision [HT] Recorded by:  [HT] Kelly Cook RN 07/19/18 1347      User Key  (r) = Recorded By, (t) = Taken By, (c) = Cosigned By    Initials Name Effective Dates Discipline     Kalli Collins, PTA 08/02/16 -  PT    HT Kelly oCok RN 08/02/16 -  Nurse          Wound 07/19/18 1347 Right hip incision (Active)   Dressing Appearance dry;intact;no drainage 7/20/2018  8:30 AM   Closure ALIRIO 7/20/2018  8:30 AM   Drainage Amount none 7/20/2018  8:30 AM   Dressing Care, Wound foam;gauze 7/20/2018  8:30 AM               PT Rehab Goals     Row Name 07/20/18 0800             Bed Mobility Goal 1 (PT)    Activity/Assistive Device (Bed Mobility Goal 1, PT) bed mobility activities, all  -MS      [place] Level/Cues Needed (Bed Mobility Goal 1, PT) independent  -MS      Time Frame (Bed Mobility Goal 1, PT) long term goal (LTG);by discharge  -MS      Progress/Outcomes (Bed Mobility Goal 1, PT) goal ongoing  -MS         Transfer Goal 1 (PT)    Activity/Assistive Device (Transfer Goal 1, PT) sit-to-stand/stand-to-sit;bed-to-chair/chair-to-bed  -MS      [place] Level/Cues Needed (Transfer Goal 1, PT) independent  -MS      Time Frame (Transfer Goal 1, PT) long term goal (LTG);by discharge  -MS      Progress/Outcome (Transfer Goal 1, PT) goal ongoing  -MS         Gait  Training Goal 1 (PT)    Activity/Assistive Device (Gait Training Goal 1, PT) gait (walking locomotion);assistive device use;backward stepping;decrease fall risk;improve balance and speed;increase endurance/gait distance;normalize weight shifts;walker, rolling  -MS      [place] Level (Gait Training Goal 1, PT) conditional independence  -MS      Distance (Gait Goal 1, PT) 200ft  -MS      Time Frame (Gait Training Goal 1, PT) long term goal (LTG);by discharge  -MS      Progress/Outcome (Gait Training Goal 1, PT) goal ongoing  -MS         Stairs Goal 1 (PT)    Activity/Assistive Device (Stairs Goal 1, PT) ascending stairs;descending stairs;walker, rolling;assistive device use  -MS      [place] Level/Cues Needed (Stairs Goal 1, PT) conditional independence  -MS      Number of Stairs (Stairs Goal 1, PT) 2 platform steps   -MS      Time Frame (Stairs Goal 1, PT) long term goal (LTG);by discharge  -MS      Progress/Outcome (Stairs Goal 1, PT) goal ongoing  -MS        User Key  (r) = Recorded By, (t) = Taken By, (c) = Cosigned By    Initials Name Provider Type Discipline    MS Teressa Jasmine, PT, DPT, NCS Physical Therapist PT          Physical Therapy Education     Title: PT OT SLP Therapies (Active)     Topic: Physical Therapy (Active)     Point: Mobility training (Done)    Learning Progress Summary     Learner Status Readiness Method Response Comment Documented by    Patient Done Acceptance E,TB,D,H BENITO,DU stairs, HEP  07/20/18 1419     Done Acceptance E VU role of PT in her care MS 07/20/18 0909          Point: Home exercise program (Done)    Learning Progress Summary     Learner Status Readiness Method Response Comment Documented by    Patient Done Acceptance E,TB,D,H BENITO,DU stairs, HEP  07/20/18 1419                      User Key     Initials Effective Dates Name Provider Type Discipline     08/02/16 -  Kalli Collins, PTA Physical Therapy Assistant PT    MS 06/19/18 -  Teressa Jasmine, PT, DPT, NCS  Physical Therapist PT                    PT Recommendation and Plan                Outcome Measures     Row Name 07/20/18 0801 07/20/18 0800          How much help from another person do you currently need...    Turning from your back to your side while in flat bed without using bedrails?  -- 3  -MS     Moving from lying on back to sitting on the side of a flat bed without bedrails?  -- 3  -MS     Moving to and from a bed to a chair (including a wheelchair)?  -- 3  -MS     Standing up from a chair using your arms (e.g., wheelchair, bedside chair)?  -- 3  -MS     Climbing 3-5 steps with a railing?  -- 2  -MS     To walk in hospital room?  -- 3  -MS     AM-PAC 6 Clicks Score  -- 17  -MS        How much help from another is currently needed...    Putting on and taking off regular lower body clothing? 2  -CS  --     Bathing (including washing, rinsing, and drying) 3  -CS  --     Toileting (which includes using toilet bed pan or urinal) 3  -CS  --     Putting on and taking off regular upper body clothing 4  -CS  --     Taking care of personal grooming (such as brushing teeth) 4  -CS  --     Eating meals 4  -CS  --     Score 20  -CS  --        Functional Assessment    Outcome Measure Options AM-PAC 6 Clicks Daily Activity (OT)  -CS AM-PAC 6 Clicks Basic Mobility (PT)  -MS       User Key  (r) = Recorded By, (t) = Taken By, (c) = Cosigned By    Initials Name Provider Type    MS Teressa Jasmine, PT, DPT, NCS Physical Therapist    CS Shanna Gary, OTR/L Occupational Therapist           Time Calculation:         PT Charges     Row Name 07/20/18 1420 07/20/18 0911          Time Calculation    Start Time 1307  -AH 0800  -MS     Stop Time 1352  -AH 0823  -MS     Time Calculation (min) 45 min  -AH 23 min  -MS     PT Received On  -- 07/20/18  -MS     PT Goal Re-Cert Due Date  -- 07/30/18  -MS        Time Calculation- PT    Total Timed Code Minutes- PT 45 minute(s)  -AH  --        Timed Charges    79315 - PT Therapeutic Exercise  Minutes 15  -  --     38170 - Gait Training Minutes  30  -AH  --       User Key  (r) = Recorded By, (t) = Taken By, (c) = Cosigned By    Initials Name Provider Type     Kalli Collins PTA Physical Therapy Assistant    MS Teressa PEREA Kenroy, PT, DPT, NCS Physical Therapist        Therapy Suggested Charges     Code   Minutes Charges    45199 (CPT®) Hc Pt Neuromusc Re Education Ea 15 Min      44222 (CPT®) Hc Pt Ther Proc Ea 15 Min 15 1    55144 (CPT®) Hc Gait Training Ea 15 Min 30 2    29766 (CPT®) Hc Pt Therapeutic Act Ea 15 Min      81804 (CPT®) Hc Pt Manual Therapy Ea 15 Min      48815 (CPT®) Hc Pt Iontophoresis Ea 15 Min      61974 (CPT®) Hc Pt Elec Stim Ea-Per 15 Min      04978 (CPT®) Hc Pt Ultrasound Ea 15 Min      64639 (CPT®) Hc Pt Self Care/Mgmt/Train Ea 15 Min      Total  45 3        Therapy Charges for Today     Code Description Service Date Service Provider Modifiers Qty    42867514561 HC PT THER SUPP EA 15 MIN 7/20/2018 Kalli Collins PTA GP 2    44305942466 HC PT THER PROC EA 15 MIN 7/20/2018 Kalli Collins PTA GP, KX 1    82218867815 HC GAIT TRAINING EA 15 MIN 7/20/2018 Kalli Collins PTA GP, KX 2          PT G-Codes  Outcome Measure Options: AM-PAC 6 Clicks Daily Activity (OT)  Score: 17  Functional Limitation: Mobility: Walking and moving around  Mobility: Walking and Moving Around Current Status (): At least 40 percent but less than 60 percent impaired, limited or restricted  Mobility: Walking and Moving Around Goal Status (): At least 1 percent but less than 20 percent impaired, limited or restricted    Kalli Collins PTA  7/20/2018

## 2018-07-20 NOTE — THERAPY EVALUATION
Acute Care - Occupational Therapy Initial Evaluation  [facility]     Patient Name: Ashley Maher  : 1940  MRN: [id number]  Today's Date: 2018  Onset of Illness/Injury or Date of Surgery: 18  Date of Referral to OT: 18  Referring Physician: Dr. Serrano    Admit Date: 2018       ICD-10-CM ICD-9-CM   1. 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     Past Medical History:   Diagnosis Date   • Arthritis    • Cataract     BILAT   • Electrolyte imbalance    • Hypertension    • Vitamin D deficiency      Past Surgical History:   Procedure Laterality Date   • ANTERIOR AND POSTERIOR VAGINAL REPAIR     • APPENDECTOMY     • COLONOSCOPY     • EXCISION LESION      RIGHT SHOULDER ABCESSES   • EYE SURGERY     • HYSTERECTOMY     • TOTAL HIP ARTHROPLASTY Right 2018    Procedure: RIGHT TOTAL HIP REPLACEMENT;  Surgeon: Waldemar Serrano MD;  Location: [facility];  Service: Orthopedics          OT ASSESSMENT FLOWSHEET (last 72 hours)      Occupational Therapy Evaluation     Row Name 18 0801                   OT Evaluation Time/Intention    Subjective Information complains of;pain;numbness   numbness below R hip incision site  -CS        Document Type evaluation  -CS        Mode of Treatment occupational therapy  -CS           General Information    Patient Profile Reviewed? yes  -CS        Onset of Illness/Injury or Date of Surgery 18  -CS        Referring Physician Dr. Serrano  -CS        Patient Observations alert;cooperative;agree to therapy  -CS        Patient/Family Observations daughter present in room  -CS        General Observations of Patient pt fowlers in bed, awake and in no apparent distress  -CS        Prior Level of Function independent:;all household mobility;ADL's  -CS        Equipment Currently Used at Home bath bench;cane, straight  -CS        Pertinent History of Current Functional  Problem s/p R anterior THR  -CS        Existing Precautions/Restrictions fall;right;hip, anterior  -CS        Limitations/Impairments hearing   hard of hearing  -CS        Risks Reviewed patient:;LOB;nausea/vomiting;dizziness;increased discomfort  -CS        Benefits Reviewed patient:;improve function;increase independence;increase strength;increase balance;decrease pain  -CS        Barriers to Rehab hearing deficit  -CS           Relationship/Environment    Lives With child(elvia), adult  -CS           Resource/Environmental Concerns    Current Living Arrangements home/apartment/condo  -CS           Home Main Entrance    Number of Stairs, Main Entrance two  -CS        Stair Railings, Main Entrance railings on both sides of stairs  -CS        Landing, Stairs, Main Entrance adequate turning radius  -CS           Cognitive Assessment/Intervention- PT/OT    Orientation Status (Cognition) oriented x 4  -CS        Follows Commands (Cognition) WNL  -CS           Safety Issues, Functional Mobility    Impairments Affecting Function (Mobility) pain  -CS           Mobility Assessment/Treatment    Extremity Weight-bearing Status right lower extremity  -CS        Right Lower Extremity (Weight-bearing Status) weight-bearing as tolerated (WBAT)  -CS           Bed Mobility Assessment/Treatment    Bed Mobility Assessment/Treatment supine-sit;scooting/bridging  -CS        Scooting/Bridging [place] (Bed Mobility) minimum assist (75% patient effort);verbal cues;nonverbal cues (demo/gesture)  -CS        Supine-Sit [place] (Bed Mobility) minimum assist (75% patient effort);verbal cues;nonverbal cues (demo/gesture)  -CS        Assistive Device (Bed Mobility) bed rails;head of bed elevated  -CS           Transfer Assessment/Treatment    Transfer Assessment/Treatment stand-sit transfer;sit-stand transfer  -CS           Sit-Stand Transfer    Sit-Stand [place] (Transfers) contact guard;verbal cues;nonverbal cues (demo/gesture)   -CS        Assistive Device (Sit-Stand Transfers) walker, front-wheeled  -CS           Stand-Sit Transfer    Stand-Sit [place] (Transfers) contact guard;verbal cues;nonverbal cues (demo/gesture)  -CS        Assistive Device (Stand-Sit Transfers) walker, front-wheeled  -CS           ADL Assessment/Intervention    BADL Assessment/Intervention lower body dressing  -CS           Lower Body Dressing Assessment/Training    Lower Body Dressing [place] Level don;socks;moderate assist (50% patient effort)  -CS        Lower Body Dressing Position edge of bed sitting  -CS        Comment (Lower Body Dressing) Pt required assist donning/doffing sock on/off RLE due to decreased ROM and pain in R hip  -CS           BADL Safety/Performance    Impairments, BADL Safety/Performance strength;range of motion;pain;endurance/activity tolerance;balance  -CS           General ROM    GENERAL ROM COMMENTS BUE AROM WFL  -CS           General Assessment (Manual Muscle Testing)    Comment, General Manual Muscle Testing (MMT) Assessment BUE functional strength: 4+/5  -CS           Sensory Assessment/Intervention    Sensory General Assessment no sensation deficits identified   BUEs  -CS           Positioning and Restraints    Pre-Treatment Position in bed  -CS        Post Treatment Position chair  -CS        In Chair sitting;call light within reach;encouraged to call for assist;with family/caregiver  -CS           Pain Assessment    Additional Documentation Pain Scale: Numbers Pre/Post-Treatment (Group)  -CS           Pain Scale: Numbers Pre/Post-Treatment    Pain Scale: Numbers, Pretreatment 6/10  -CS        Pain Scale: Numbers, Post-Treatment 8/10  -CS        Pain Location - Side Right  -CS        Pain Location hip  -CS        Pain Intervention(s) Medication (See MAR);Repositioned;Ambulation/increased activity  -CS           Wound 07/19/18 1347 Right hip incision    Wound - Properties Group Date first assessed: 07/19/18  - Time first  assessed: 1347  -HT Side: Right  -HT Location: hip  -HT Type: incision  -HT       Plan of Care Review    Plan of Care Reviewed With patient  -CS           Clinical Impression (OT)    Date of Referral to OT 07/19/18  -CS        OT Diagnosis Impaired ADLs and functional mobility  -CS        Patient/Family Goals Statement (OT Eval) to go home  -CS        Criteria for Skilled Therapeutic Interventions Met (OT Eval) yes;treatment indicated  -CS        Rehab Potential (OT Eval) good, to achieve stated therapy goals  -CS        Therapy Frequency (OT Eval) 5 times/wk  -CS        Predicted Duration of Therapy Intervention (Therapy Eval) until hospital discharge  -CS        Care Plan Review (OT) evaluation/treatment results reviewed;care plan/treatment goals reviewed;risks/benefits reviewed;current/potential barriers reviewed  -CS        Patient/Family Concerns, Equipment Needs at Discharge (OT) Pt would benefit from LH AE for home use  -CS        Anticipated Discharge Disposition (OT) home with assist;home with home health  -CS           Planned OT Interventions    Planned Therapy Interventions (OT Eval) activity tolerance training;adaptive equipment training;BADL retraining;transfer/mobility retraining;patient/caregiver education/training;occupation/activity based interventions;functional balance retraining  -CS           OT Goals    Transfer Goal Selection (OT) transfer, OT goal 1  -CS        Dressing Goal Selection (OT) dressing, OT goal 1  -CS        Toileting Goal Selection (OT) toileting, OT goal 1  -CS           Transfer Goal 1 (OT)    Activity/Assistive Device (Transfer Goal 1, OT) sit-to-stand/stand-to-sit;bed-to-chair/chair-to-bed;toilet;tub;tub bench  -CS        [place] Level/Cues Needed (Transfer Goal 1, OT) conditional independence  -CS        Time Frame (Transfer Goal 1, OT) 10 days  -CS        Barriers (Transfers Goal 1, OT) .  -CS        Progress/Outcome (Transfer Goal 1, OT) goal ongoing  -CS            Dressing Goal 1 (OT)    Activity/Assistive Device (Dressing Goal 1, OT) lower body dressing   AE PRN  -CS        [place]/Cues Needed (Dressing Goal 1, OT) conditional independence  -CS        Time Frame (Dressing Goal 1, OT) 10 days  -CS        Barriers (Dressing Goal 1, OT) .  -CS        Progress/Outcome (Dressing Goal 1, OT) goal ongoing  -CS           Toileting Goal 1 (OT)    Activity/Device (Toileting Goal 1, OT) toileting skills, all;commode  -CS        [place] Level/Cues Needed (Toileting Goal 1, OT) conditional independence  -CS        Time Frame (Toileting Goal 1, OT) 10 days  -CS        Barriers (Toileting Goal 1, OT) .  -CS        Progress/Outcome (Toileting Goal 1, OT) goal ongoing  -CS           Living Environment    Home Accessibility stairs to enter home;tub/shower is not walk in  -CS          User Key  (r) = Recorded By, (t) = Taken By, (c) = Cosigned By    Initials Name Effective Dates     KENNEDY Mandel/L 08/02/16 -     HT Kelly Cook RN 08/02/16 -            Occupational Therapy Education     Title: PT OT SLP Therapies (Done)     Topic: Occupational Therapy (Done)     Point: ADL training (Done)     Description: Instruct learner(s) on proper safety adaptation and remediation techniques during self care or transfers.   Instruct in proper use of assistive devices.   Learning Progress Summary     Learner Status Readiness Method Response Comment Documented by    Patient Done Acceptance E,TB VU OT POC, benefits of activity, ADLs, walker safety, transfer safety, bed mobility  07/20/18 0855                      User Key     Initials Effective Dates Name Provider Type Discipline     08/02/16 -  Shanna Gary OTR/L Occupational Therapist OT                  OT Recommendation and Plan  Outcome Summary/Treatment Plan (OT)  Patient/Family Concerns, Equipment Needs at Discharge (OT): Pt would benefit from LH AE for home use  Anticipated Discharge Disposition (OT): home with assist,  home with home health  Planned Therapy Interventions (OT Eval): activity tolerance training, adaptive equipment training, BADL retraining, transfer/mobility retraining, patient/caregiver education/training, occupation/activity based interventions, functional balance retraining  Therapy Frequency (OT Eval): 5 times/wk  Plan of Care Review  Plan of Care Reviewed With: patient  Plan of Care Reviewed With: patient  Outcome Summary: OT evaluation completed.  Pt demo need for skilled OT services due to requiring mod A with LB dressing, and CGA/min A with bed mobility and functional transfers.  Pt completed functional mobility in room and hallway with CGA.  Pt is limited with LB dressing due to decreased ROM and R hip pain.  Pt is also limited due to decreased strength, balance, and endurace.  OT will cont to work with pt to increase her I with ADLs and functional mobility.  It is recommended pt discharge home with HH OT and PT.          Outcome Measures     Row Name 07/20/18 0801             How much help from another is currently needed...    Putting on and taking off regular lower body clothing? 2  -CS      Bathing (including washing, rinsing, and drying) 3  -CS      Toileting (which includes using toilet bed pan or urinal) 3  -CS      Putting on and taking off regular upper body clothing 4  -CS      Taking care of personal grooming (such as brushing teeth) 4  -CS      Eating meals 4  -CS      Score 20  -CS         Functional Assessment    Outcome Measure Options AM-PAC 6 Clicks Daily Activity (OT)  -CS        User Key  (r) = Recorded By, (t) = Taken By, (c) = Cosigned By    Initials Name Provider Type    ED Gary, OTR/L Occupational Therapist          Time Calculation:   OT Start Time: 0800  OT Stop Time: 0830  OT Time Calculation (min): 30 min  Therapy Suggested Charges     Code   Minutes Charges    None           Therapy Charges for Today     Code Description Service Date Service Provider Modifiers Qty     40692790940 HC OT SELFCARE CURRENT 7/20/2018 Shanna S Abdirahman OTR/L GO, CJ 1    88839898351 HC OT SELFCARE PROJECTED 7/20/2018 Shanna S Abdirahman OTR/L GO, CI 1    41315140087 HC OT EVAL MOD COMPLEXITY 2 7/20/2018 Shanna Gary OTR/L GO, KX 1          OT G-codes  OT Professional Judgement Used?: Yes  OT Functional Scales Options: AM-PAC 6 Clicks Daily Activity (OT)  Score: 20  Functional Limitation: Self care  Self Care Current Status (): At least 20 percent but less than 40 percent impaired, limited or restricted  Self Care Goal Status (): At least 1 percent but less than 20 percent impaired, limited or restricted    Shanna Gary OTR/L  7/20/2018

## 2018-07-20 NOTE — PLAN OF CARE
Problem: Patient Care Overview  Goal: Plan of Care Review  Outcome: Outcome(s) achieved Date Met: 07/20/18 07/20/18 1700   Coping/Psychosocial   Plan of Care Reviewed With patient   Plan of Care Review   Progress improving   OTHER   Outcome Summary Patient standby assist with walker. C/O mild to moderate pain with pain med x1 this shift. Ready for DC home.       Problem: Fall Risk (Adult)  Goal: Absence of Fall  Outcome: Outcome(s) achieved Date Met: 07/20/18      Problem: Hip Arthroplasty (Total, Partial) (Adult)  Goal: Signs and Symptoms of Listed Potential Problems Will be Absent, Minimized or Managed (Hip Arthroplasty)  Outcome: Outcome(s) achieved Date Met: 07/20/18

## 2018-07-20 NOTE — PROGRESS NOTES
Discharge Planning Assessment  [facility]     Patient Name: Ashley Maher  MRN: [id number]  Today's Date: 7/20/2018    Admit Date: 7/19/2018          Discharge Needs Assessment     Row Name 07/20/18 0921       Living Environment    Lives With child(elvia), adult    Current Living Arrangements home/apartment/condo    Primary Care Provided by self    Provides Primary Care For no one    Family Caregiver if Needed child(elvia), adult    Family Caregiver Names --   Nikki    Quality of Family Relationships helpful;involved;supportive    Able to Return to Prior Arrangements yes       Resource/Environmental Concerns    Resource/Environmental Concerns none    Transportation Concerns car, none       Transition Planning    Patient/Family Anticipates Transition to home with family    Patient/Family Anticipated Services at Transition none    Transportation Anticipated family or friend will provide       Discharge Needs Assessment    Readmission Within the Last 30 Days no previous admission in last 30 days    Concerns to be Addressed adjustment to diagnosis/illness;discharge planning    Equipment Currently Used at Home cane, quad;bath bench    Equipment Needed After Discharge walker, rolling    Outpatient/Agency/Support Group Needs homecare agency    Discharge Coordination/Progress Patient lives with her daughter and plans to return home at time of discharge. Patient does have a bath bench to use at home. Patient will likely need a rolling walker and would benefit from home health care to continue therapy. SS to follow and will arrange walker/HH with MD orders.                   SHIKHA Feliz

## 2018-07-20 NOTE — PROGRESS NOTES
Continued Stay Note   [facility]     Patient Name: Ashley Maher  MRN: [id number]  Today's Date: 7/20/2018    Admit Date: 7/19/2018          Discharge Plan     Row Name 07/20/18 1347       Plan    Plan Walker    Patient/Family in Agreement with Plan yes    Plan Comments Patient has decided against home health and states that her daughter will be taking her outpatient for PT. Dr. Serrano did order a walker for patient. SW spoke to patient and daughter in room re this. Patient would like to have walker delivered to room. SW provided options for DME agencies that deliver to room and patient has selected [facility]. SW faxed referral to [facility] at [phone]. SW requested delivery to room. No other discharge planning needs identified.     Final Discharge Disposition Code 01 - home or self-care           SHIKHA Feliz

## 2018-07-20 NOTE — PLAN OF CARE
Problem: Patient Care Overview  Goal: Plan of Care Review  Outcome: Ongoing (interventions implemented as appropriate)   07/19/18 1922   Coping/Psychosocial   Plan of Care Reviewed With patient   Plan of Care Review   Progress no change   OTHER   Outcome Summary Patient admitted following right hip surgery by Dr. Serrano. BP elevated upon arrival. Medicated x1 for c/o pain with relief. Dressing to right hip CDI, neurovascular WDL       Problem: Fall Risk (Adult)  Goal: Identify Related Risk Factors and Signs and Symptoms  Outcome: Outcome(s) achieved Date Met: 07/19/18    Goal: Absence of Fall  Outcome: Ongoing (interventions implemented as appropriate)      Problem: Hip Arthroplasty (Total, Partial) (Adult)  Goal: Signs and Symptoms of Listed Potential Problems Will be Absent, Minimized or Managed (Hip Arthroplasty)  Outcome: Ongoing (interventions implemented as appropriate)    Goal: Anesthesia/Sedation Recovery  Outcome: Outcome(s) achieved Date Met: 07/19/18

## 2018-07-20 NOTE — PROGRESS NOTES
"  Orthopedic Surgery Right THRA Progress Note        Ashley Maher  2018  POD# 1 Day Post-Op    Subjective:     Interval: Patient reports very little discomfort aside from palpation over incision presently    Objective:     General: A&O x3, NAD, No signs or symptoms of PE.   Wound: clean, dry, intact             Dressing: Clean, Dry, Intact   Extremity: Distal NVI, Soft throughout           DVT Exam: No evidence of DVT seen on physical exam.                   Data Review:  Vitals:  /62 (BP Location: Left arm, Patient Position: Sitting)   Pulse 76   Temp 98.1 °F (36.7 °C) (Oral)   Resp 18   Ht 157.5 cm (62\")   Wt 56.4 kg (124 lb 4.8 oz)   SpO2 97%   BMI 22.73 kg/m²   Temp (24hrs), Av.8 °F (36.6 °C), Min:97.3 °F (36.3 °C), Max:98.4 °F (36.9 °C)      I/O (24Hr):    Intake/Output Summary (Last 24 hours) at 18 1302  Last data filed at 18 1230   Gross per 24 hour   Intake             2000 ml   Output             1900 ml   Net              100 ml       Labs:  Lab Results (last 72 hours)     Procedure Component Value Units Date/Time    Basic Metabolic Panel [[id number]]  (Abnormal) Collected:  18    Specimen:  Blood Updated:  18 044     Glucose 105 (H) mg/dL      BUN 8 mg/dL      Creatinine 0.54 mg/dL      Sodium 132 (L) mmol/L      Potassium 3.9 mmol/L      Chloride 98 mmol/L      CO2 26.0 mmol/L      Calcium 8.5 mg/dL      eGFR Non African Amer 109 mL/min/1.73      BUN/Creatinine Ratio 14.8     Anion Gap 8.0 mmol/L     Narrative:       The MDRD GFR formula is only valid for adults with stable renal function between ages 18 and 70.    Hemoglobin & Hematocrit, Blood [[id number]]  (Abnormal) Collected:  18    Specimen:  Blood Updated:  18 0432     Hemoglobin 9.7 (L) g/dL      Hematocrit 28.1 (L) %     Hemoglobin & Hematocrit, Blood [[id number]]  (Abnormal) Collected:  18 1701    Specimen:  Blood Updated:  18 1725     Hemoglobin 10.1 (L) g/dL      " Hematocrit 29.6 (L) %           Assessment:     POD 1 Day Post-Op RIGHT TOTAL HIP REPLACEMENT (Right)    Plan:      1:  DVT prophylaxis, ICE, elevate  2:  Pain control  3:  Physical therapy/Occupation therapy  4:  Anticipate discharge tomorrow if pain well controlled  5:  WBAT operative extremity    Electronically signed by Waldemar Serrano MD on 7/20/2018 at 1:02 PM

## 2018-07-22 NOTE — THERAPY DISCHARGE NOTE
Acute Care - Physical Therapy Discharge Summary  [facility]       Patient Name: Ashley Maher  : 1940  MRN: [id number]    Today's Date: 2018  Onset of Illness/Injury or Date of Surgery: 18    Date of Referral to PT: 18  Referring Physician: Dr. Serrano      Admit Date: 2018      PT Recommendation and Plan    Visit Dx:    ICD-10-CM ICD-9-CM   1. 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 Impaired mobility and ADLs Z74.09 799.89   3. Impaired mobility Z74.09 799.89             Outcome Measures     Row Name 18 0801 18 0800          How much help from another person do you currently need...    Turning from your back to your side while in flat bed without using bedrails?  -- 3  -MS     Moving from lying on back to sitting on the side of a flat bed without bedrails?  -- 3  -MS     Moving to and from a bed to a chair (including a wheelchair)?  -- 3  -MS     Standing up from a chair using your arms (e.g., wheelchair, bedside chair)?  -- 3  -MS     Climbing 3-5 steps with a railing?  -- 2  -MS     To walk in hospital room?  -- 3  -MS     AM-PAC 6 Clicks Score  -- 17  -MS        How much help from another is currently needed...    Putting on and taking off regular lower body clothing? 2  -CS  --     Bathing (including washing, rinsing, and drying) 3  -CS  --     Toileting (which includes using toilet bed pan or urinal) 3  -CS  --     Putting on and taking off regular upper body clothing 4  -CS  --     Taking care of personal grooming (such as brushing teeth) 4  -CS  --     Eating meals 4  -CS  --     Score 20  -CS  --        Functional Assessment    Outcome Measure Options AM-PAC 6 Clicks Daily Activity (OT)  -CS AM-PAC 6 Clicks Basic Mobility (PT)  -MS       User Key  (r) = Recorded By, (t) = Taken By, (c) = Cosigned By    Initials Name Provider Type    MS Teressa Jasmine, PT, DPT, NCS Physical Therapist    CS Shanna Gary, OTR/L Occupational Therapist             Therapy Suggested Charges     Code   Minutes Charges    93273 (CPT®) Hc Pt Neuromusc Re Education Ea 15 Min      16433 (CPT®) Hc Pt Ther Proc Ea 15 Min 15 1    55590 (CPT®) Hc Gait Training Ea 15 Min 30 2    96041 (CPT®) Hc Pt Therapeutic Act Ea 15 Min      50047 (CPT®) Hc Pt Manual Therapy Ea 15 Min      43436 (CPT®) Hc Pt Iontophoresis Ea 15 Min      42403 (CPT®) Hc Pt Elec Stim Ea-Per 15 Min      32776 (CPT®) Hc Pt Ultrasound Ea 15 Min      98950 (CPT®) Hc Pt Self Care/Mgmt/Train Ea 15 Min      Total  45 3                PT Rehab Goals     Row Name 07/22/18 0745             Bed Mobility Goal 1 (PT)    Activity/Assistive Device (Bed Mobility Goal 1, PT) bed mobility activities, all  -CW      Callahan Level/Cues Needed (Bed Mobility Goal 1, PT) independent  -CW      Time Frame (Bed Mobility Goal 1, PT) long term goal (LTG);by discharge  -CW      Progress/Outcomes (Bed Mobility Goal 1, PT) goal not met  -CW         Transfer Goal 1 (PT)    Activity/Assistive Device (Transfer Goal 1, PT) sit-to-stand/stand-to-sit;bed-to-chair/chair-to-bed  -CW      Callahan Level/Cues Needed (Transfer Goal 1, PT) independent  -CW      Time Frame (Transfer Goal 1, PT) long term goal (LTG);by discharge  -CW      Progress/Outcome (Transfer Goal 1, PT) goal not met  -CW         Gait Training Goal 1 (PT)    Activity/Assistive Device (Gait Training Goal 1, PT) gait (walking locomotion);assistive device use;backward stepping;decrease fall risk;improve balance and speed;increase endurance/gait distance;normalize weight shifts;walker, rolling  -CW      Callahan Level (Gait Training Goal 1, PT) conditional independence  -CW      Distance (Gait Goal 1, PT) 200ft  -CW      Time Frame (Gait Training Goal 1, PT) long term goal (LTG);by discharge  -CW      Progress/Outcome (Gait Training Goal 1, PT) goal not met  -CW         Stairs Goal 1 (PT)    Activity/Assistive Device (Stairs Goal 1, PT) ascending stairs;descending  stairs;walker, rolling;assistive device use  -CW      Simpson Level/Cues Needed (Stairs Goal 1, PT) conditional independence  -CW      Number of Stairs (Stairs Goal 1, PT) 2 platform steps   -CW      Time Frame (Stairs Goal 1, PT) long term goal (LTG);by discharge  -CW      Progress/Outcome (Stairs Goal 1, PT) goal not met  -CW        User Key  (r) = Recorded By, (t) = Taken By, (c) = Cosigned By    Initials Name Provider Type Discipline    CW Sonia Ceron PTA Physical Therapy Assistant PT              PT Discharge Summary  Reason for Discharge: Discharge from facility  Outcomes Achieved: Refer to plan of care for updates on goals achieved  Discharge Destination: Home      Sonia Ceron PTA   7/22/2018

## 2018-07-23 LAB
ABO + RH BLD: NORMAL
ABO + RH BLD: NORMAL
BH BB BLOOD EXPIRATION DATE: NORMAL
BH BB BLOOD EXPIRATION DATE: NORMAL
BH BB BLOOD TYPE BARCODE: 6200
BH BB BLOOD TYPE BARCODE: 6200
BH BB DISPENSE STATUS: NORMAL
BH BB DISPENSE STATUS: NORMAL
BH BB PRODUCT CODE: NORMAL
BH BB PRODUCT CODE: NORMAL
BH BB UNIT NUMBER: NORMAL
BH BB UNIT NUMBER: NORMAL
CROSSMATCH INTERPRETATION: NORMAL
CROSSMATCH INTERPRETATION: NORMAL
UNIT  ABO: NORMAL
UNIT  ABO: NORMAL
UNIT  RH: NORMAL
UNIT  RH: NORMAL

## 2018-07-26 NOTE — DISCHARGE SUMMARY
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  At the time of discharge, the patient was afebrile, vitals stable, pain was controlled with oral medication, they were tolerating a by mouth diet, and voiding appropriately. Physical therapy and occupational therapy were consulted. Given these findings they were deemed suitable to be discharged.    Disposition: Home    Activity: WBAT right lower    Wound Instructions: see postop instructions    Diet: regular    Resume home meds:   Prior to Admission medications    Medication Sig Start Date End Date Taking? Authorizing Provider   bimatoprost (LUMIGAN) 0.01 % ophthalmic drops Administer 1 drop to the right eye Every Night.   Yes Historical Provider, MD   Cholecalciferol (VITAMIN D3) 5000 units capsule capsule Take 5,000 Units by mouth Daily.   Yes Historical Provider, MD   lisinopril (PRINIVIL,ZESTRIL) 40 MG tablet Take 40 mg by mouth Daily.   Yes Historical Provider, MD   montelukast (SINGULAIR) 10 MG tablet Take 10 mg by mouth Every Night.   Yes Historical Provider, MD   docusate sodium 100 MG capsule Take 100 mg by mouth 2 (Two) Times a Day As Needed for Constipation (While taking pain medications). 18   Waldemar Serrano MD   ondansetron (ZOFRAN) 4 MG tablet Take 1 tablet by mouth Every 6 (Six) Hours As Needed for Nausea or Vomiting. 18   Waldemar Serrano MD   oxyCODONE-acetaminophen (PERCOCET)  MG per tablet Take 1 tablet by mouth Every 6 (Six) Hours As Needed for Moderate Pain  for up to 9 days. 18  Waldemar Serrano MD   rivaroxaban (XARELTO) 10  MG tablet Take 1 tablet by mouth Daily for 21 days. 7/21/18 8/11/18  Waldemar Serrano MD       Prescriptions for:  Percocet 10/325, #60    Return to Clinic: 2 weeks    Xrays: yes

## 2021-07-21 ENCOUNTER — TELEPHONE (OUTPATIENT)
Dept: NEUROSURGERY | Age: 81
End: 2021-07-21

## 2021-07-21 NOTE — TELEPHONE ENCOUNTER
[facility] New Patient Questionnaire    1. Diagnosis/Reason for Referral?    Compression fracture T11    2. Who is completing questionnaire? Patient x Caregiver Family      3. Has the patient had any previous spinal/brain surgeries? no        A. If yes, what is the name of the facility in which the surgery was performed? B. Procedure/Surgery performed? C. Who was the surgeon? D. When was the surgery? MM/YY       E. Did the patient improve after the surgery? 4. Is this a second opinion? If yes, Dr. Ely Molina would like to review patient first before making the appointment. 5. Have MRI Images been obtain within the last year? Yes  No      XR x CTx     If yes, where was the imaging performed? [facility]     If yes, what part of the body? Lumbar  Cervical  Thoracic x Brain     If yes, when was it obtained?      7-21  Note: if the scan was performed at a facility other than [facility], the disc will need to be brought to the appointment or we need to reach out to obtain the disc. A. Was the patient instructed to provide the disc? Yes  x No      8. Has the patient had a NCV/EMG within the last year? Yes  No     If yes, where was it performed and date? MM/YY  Location:      9. Has the patient been to Physical Therapy? Yes  No x     If yes, what location, how long attended, and last visit? Location:        Therapy Lasted:    Date of Last Visit:      10. Has the patient been to Pain Management? Yes  [facility]     If yes, what location and last visit     Location:   Last Visit:   Is it helping?

## 2021-07-26 ENCOUNTER — HOSPITAL ENCOUNTER (OUTPATIENT)
Dept: GENERAL RADIOLOGY | Age: 81
Discharge: HOME | End: 2021-07-26
Payer: MEDICARE

## 2021-07-26 ENCOUNTER — OFFICE VISIT (OUTPATIENT)
Dept: NEUROSURGERY | Age: 81
End: 2021-07-26
Payer: MEDICARE

## 2021-07-26 VITALS
SYSTOLIC BLOOD PRESSURE: 147 MMHG | BODY MASS INDEX: 25.03 KG/M2 | HEIGHT: 62 IN | DIASTOLIC BLOOD PRESSURE: 72 MMHG | HEART RATE: 64 BPM | WEIGHT: 136 LBS

## 2021-07-26 DIAGNOSIS — S22.080D CLOSED WEDGE COMPRESSION FRACTURE OF T11 VERTEBRA WITH ROUTINE HEALING, SUBSEQUENT ENCOUNTER: ICD-10-CM

## 2021-07-26 DIAGNOSIS — S22.080D CLOSED WEDGE COMPRESSION FRACTURE OF T11 VERTEBRA WITH ROUTINE HEALING, SUBSEQUENT ENCOUNTER: Primary | ICD-10-CM

## 2021-07-26 PROCEDURE — 1036F TOBACCO NON-USER: CPT | Performed by: NEUROLOGICAL SURGERY

## 2021-07-26 PROCEDURE — 99204 OFFICE O/P NEW MOD 45 MIN: CPT | Performed by: NEUROLOGICAL SURGERY

## 2021-07-26 PROCEDURE — G8400 PT W/DXA NO RESULTS DOC: HCPCS | Performed by: NEUROLOGICAL SURGERY

## 2021-07-26 PROCEDURE — G8420 CALC BMI NORM PARAMETERS: HCPCS | Performed by: NEUROLOGICAL SURGERY

## 2021-07-26 PROCEDURE — 1123F ACP DISCUSS/DSCN MKR DOCD: CPT | Performed by: NEUROLOGICAL SURGERY

## 2021-07-26 PROCEDURE — 4040F PNEUMOC VAC/ADMIN/RCVD: CPT | Performed by: NEUROLOGICAL SURGERY

## 2021-07-26 PROCEDURE — 1090F PRES/ABSN URINE INCON ASSESS: CPT | Performed by: NEUROLOGICAL SURGERY

## 2021-07-26 PROCEDURE — G8427 DOCREV CUR MEDS BY ELIG CLIN: HCPCS | Performed by: NEUROLOGICAL SURGERY

## 2021-07-26 PROCEDURE — 72100 X-RAY EXAM L-S SPINE 2/3 VWS: CPT

## 2021-07-26 RX ORDER — LISINOPRIL 40 MG/1
40 TABLET ORAL DAILY
COMMUNITY

## 2021-07-26 RX ORDER — MONTELUKAST SODIUM 10 MG/1
10 TABLET ORAL NIGHTLY
COMMUNITY

## 2021-07-26 RX ORDER — ACETAMINOPHEN 500 MG
500 TABLET ORAL EVERY 6 HOURS PRN
COMMUNITY

## 2021-07-26 RX ORDER — ACETAMINOPHEN 160 MG
TABLET,DISINTEGRATING ORAL
COMMUNITY

## 2021-07-26 RX ORDER — MAGNESIUM GLUCONATE 27 MG(500)
500 TABLET ORAL 2 TIMES DAILY
COMMUNITY

## 2021-07-26 NOTE — PROGRESS NOTES
antibiotics, and Trimethoprim    Social History:   Social History     Tobacco Use   Smoking Status Former Smoker   Smokeless Tobacco Former User     Social History     Substance and Sexual Activity   Alcohol Use None         Family History:   History reviewed. No pertinent family history. REVIEW OF SYSTEMS:    Constitutional: Negative. HENT: Negative. Eyes: Negative. Respiratory: Negative. Cardiovascular: Negative. Gastrointestinal: Negative. Genitourinary: Negative. Musculoskeletal: Positive for back pain. Skin: Negative. Neurological: Negative. Endo/Heme/Allergies: Negative. Psychiatric/Behavioral: Negative. Review of Systems was obtained by the medical assistant and reviewed by myself. PHYSICAL EXAM:    Vitals:    07/26/21 1103   BP: (!) 147/72   Pulse: 64       Constitutional: Appears well-developed and well-nourished. Eyes - conjunctiva normal.  Pupils react to light  Ear, nose,throat -Normal pinna and tragus, No scars, or lesions over external nose or ears, no obvious atrophy of tongue  Neck- symmetric, trachea midline, no jugular vein distension  Respiration- chest wall symmetric, normal effort without use of accessory muscles  Musculoskeletal - no significant wasting of muscles noted, no bony deformities, symmetric bulk. Midline tenderness to palpation in the lower thoracic spine. Extremities- no clubbing, cyanosis or edema  Skin - warm, dry, and intact. No rash,erythema, or pallor.   Psychiatric - mood, affect, and behavior appear normal.       NEUROLOGIC EXAM:    MENTAL STATUS: Alert, oriented, thought content appropriate    CRANIAL NERVES: PERRL, EOMI, symmetric facies, tongue midline    MOTOR:     Right Upper Extremity:    Deltoid: 5/5  Biceps: 5/5  Triceps: 5/5  Wrist Extension: 5/5  Finger Abduction: 5/5    Left Upper Extremity:    Deltoid: 5/5  Biceps: 5/5  Triceps: 5/5  Wrist Extension: 5/5  Finger Abduction: 5/5    Right Lower Extremity:    Hip Flexion: 5/5  Knee Extension: 5/5  Ankle Plantarflexion: 5/5  Ankle Dorsiflexion: 5/5      Left Lower Extremity:    Hip Flexion: 5/5  Knee Extension: 5/5  Ankle Plantarflexion: 5/5  Ankle Dorsiflexion: 5/5      SOMATOSENSORY:     Right Upper Extremity: normal light touch sensation  Left Upper Extremity: normal light touch sensation  Right Lower Extremity: normal light touch sensation  Left Lower Extremity: normal light touch sensation      REFLEXES:    Biceps: 2+  Patella: 2+    Sanchez's: Negative      COORDINATION and GAIT:      Gait: Antalgic        IMAGING:    My interpretation of imaging studies:     X-rays and CT scans of the thoracic and lumbar spine reveal multilevel degenerative changes and demineralization with an acute-appearing T11 compression deformity. There is ~25% anterior height loss and a mild increase in kyphosis. There is no retropulsion or posterior element involvement. ASSESSMENT AND PLAN:  This is a 79 y.o. female who presents for neurosurgical evaluation of a T11 compression fracture sustained after a mechanical fall. She is neurologically intact and her pain appears to be reasonably well-controlled. We provided her with an LSO brace and I instructed her to wear this whenever she is up and active. I also advised her to avoid bending, twisting and lifting  >5 lbs. I will plan to repeat her x-rays today and ensure her fracture is stable and plan to follow up again in 4 weeks with repeat x-rays.             Fransico Padilla MD

## 2021-08-23 ENCOUNTER — TELEPHONE (OUTPATIENT)
Dept: NEUROLOGY | Age: 81
End: 2021-08-23

## 2021-08-23 NOTE — TELEPHONE ENCOUNTER
Called and spoke with the patient. She voiced that she was supposed to have a 4 week follow up with Dr Pooja Hubbard and an xray. Patient has been scheduled on 8/30/2021 at 3:30 pm with Dr Pooja Hubbard, and will complete her xray prior to her visit with our office. Patient voiced understanding.

## 2021-08-30 ENCOUNTER — OFFICE VISIT (OUTPATIENT)
Dept: NEUROSURGERY | Age: 81
End: 2021-08-30
Payer: MEDICARE

## 2021-08-30 ENCOUNTER — HOSPITAL ENCOUNTER (OUTPATIENT)
Dept: GENERAL RADIOLOGY | Age: 81
Discharge: HOME | End: 2021-08-30
Payer: MEDICARE

## 2021-08-30 VITALS
WEIGHT: 140 LBS | HEART RATE: 66 BPM | HEIGHT: 62 IN | DIASTOLIC BLOOD PRESSURE: 81 MMHG | BODY MASS INDEX: 25.76 KG/M2 | SYSTOLIC BLOOD PRESSURE: 177 MMHG

## 2021-08-30 DIAGNOSIS — S22.080D CLOSED WEDGE COMPRESSION FRACTURE OF T11 VERTEBRA WITH ROUTINE HEALING, SUBSEQUENT ENCOUNTER: ICD-10-CM

## 2021-08-30 DIAGNOSIS — S22.080D CLOSED WEDGE COMPRESSION FRACTURE OF T11 VERTEBRA WITH ROUTINE HEALING, SUBSEQUENT ENCOUNTER: Primary | ICD-10-CM

## 2021-08-30 PROCEDURE — G8400 PT W/DXA NO RESULTS DOC: HCPCS | Performed by: NEUROLOGICAL SURGERY

## 2021-08-30 PROCEDURE — 1036F TOBACCO NON-USER: CPT | Performed by: NEUROLOGICAL SURGERY

## 2021-08-30 PROCEDURE — G8417 CALC BMI ABV UP PARAM F/U: HCPCS | Performed by: NEUROLOGICAL SURGERY

## 2021-08-30 PROCEDURE — 1090F PRES/ABSN URINE INCON ASSESS: CPT | Performed by: NEUROLOGICAL SURGERY

## 2021-08-30 PROCEDURE — 1123F ACP DISCUSS/DSCN MKR DOCD: CPT | Performed by: NEUROLOGICAL SURGERY

## 2021-08-30 PROCEDURE — G8427 DOCREV CUR MEDS BY ELIG CLIN: HCPCS | Performed by: NEUROLOGICAL SURGERY

## 2021-08-30 PROCEDURE — 99213 OFFICE O/P EST LOW 20 MIN: CPT | Performed by: NEUROLOGICAL SURGERY

## 2021-08-30 PROCEDURE — 72100 X-RAY EXAM L-S SPINE 2/3 VWS: CPT

## 2021-08-30 PROCEDURE — 4040F PNEUMOC VAC/ADMIN/RCVD: CPT | Performed by: NEUROLOGICAL SURGERY

## 2021-08-30 NOTE — PROGRESS NOTES
NEUROSURGERY FOLLOW UP      Chief Complaint:   Chief Complaint   Patient presents with    Follow-up     back pain. review imaging. Interval Update:  Planned follow-up for a T11 compression fracture being managed in an LSO brace. She reports that her pain had improved but she underwent a DEXA scan recently and she was required to lay on a hard table and notes worsening of back pain since that time. She also reports that long car rides worsen her back pain. She denies any radicular pain or lower extremity weakness. She is wearing her brace as-instructed and following her activity limitations. HPI: The patient is a 79 y.o. female who presents for neurosurgical evaluation of thoracic back pain and a newly-diagnosed T11 compression fracture. She states she slipped trying to go to the bathroom and fell landing on her backside on  7/13/2021. She noted immediate lower-thoracic back pain. The next day, she presented to the ED in [facility] and x-rays and a CT of her thoracic and lumbar spine were obtained. These studies revealed an acute-appearing T11 compression fracture and she has been referred for neurosurgical evaluation. She continues to endorse back pain that she is treating with tylenol. The pain is worsened by movement. She denies any radicular pain and she denies any lower extremity numbness or weakness. She has chronic urinary incontinence and bladder issues and she self-catheterizes several times daily and she also uses a pessary. She was not provided any type of back brace or orthosis by the ED. ROS:    Constitutional: Negative. HENT: Negative. Eyes: Negative. Respiratory: Negative. Cardiovascular: Negative. Gastrointestinal: Negative. Genitourinary: Negative. Musculoskeletal: Positive for back pain. Skin: Negative. Neurological: Negative. Endo/Heme/Allergies: Negative. Psychiatric/Behavioral: Negative.      Review of systems was obtained by the medical

## 2021-09-29 ENCOUNTER — OFFICE VISIT (OUTPATIENT)
Dept: NEUROSURGERY | Age: 81
End: 2021-09-29
Payer: MEDICARE

## 2021-09-29 ENCOUNTER — HOSPITAL ENCOUNTER (OUTPATIENT)
Dept: GENERAL RADIOLOGY | Age: 81
Discharge: HOME | End: 2021-09-29
Payer: MEDICARE

## 2021-09-29 VITALS
DIASTOLIC BLOOD PRESSURE: 70 MMHG | WEIGHT: 137 LBS | SYSTOLIC BLOOD PRESSURE: 161 MMHG | HEIGHT: 62 IN | BODY MASS INDEX: 25.21 KG/M2 | HEART RATE: 64 BPM

## 2021-09-29 DIAGNOSIS — S22.080D CLOSED WEDGE COMPRESSION FRACTURE OF T11 VERTEBRA WITH ROUTINE HEALING, SUBSEQUENT ENCOUNTER: ICD-10-CM

## 2021-09-29 DIAGNOSIS — S22.080D CLOSED WEDGE COMPRESSION FRACTURE OF T11 VERTEBRA WITH ROUTINE HEALING, SUBSEQUENT ENCOUNTER: Primary | ICD-10-CM

## 2021-09-29 PROCEDURE — G8417 CALC BMI ABV UP PARAM F/U: HCPCS | Performed by: NEUROLOGICAL SURGERY

## 2021-09-29 PROCEDURE — 1036F TOBACCO NON-USER: CPT | Performed by: NEUROLOGICAL SURGERY

## 2021-09-29 PROCEDURE — G8400 PT W/DXA NO RESULTS DOC: HCPCS | Performed by: NEUROLOGICAL SURGERY

## 2021-09-29 PROCEDURE — 4040F PNEUMOC VAC/ADMIN/RCVD: CPT | Performed by: NEUROLOGICAL SURGERY

## 2021-09-29 PROCEDURE — 99213 OFFICE O/P EST LOW 20 MIN: CPT | Performed by: NEUROLOGICAL SURGERY

## 2021-09-29 PROCEDURE — 1090F PRES/ABSN URINE INCON ASSESS: CPT | Performed by: NEUROLOGICAL SURGERY

## 2021-09-29 PROCEDURE — 72100 X-RAY EXAM L-S SPINE 2/3 VWS: CPT

## 2021-09-29 PROCEDURE — 1123F ACP DISCUSS/DSCN MKR DOCD: CPT | Performed by: NEUROLOGICAL SURGERY

## 2021-09-29 PROCEDURE — G8427 DOCREV CUR MEDS BY ELIG CLIN: HCPCS | Performed by: NEUROLOGICAL SURGERY

## 2021-09-29 NOTE — PROGRESS NOTES
NEUROSURGERY FOLLOW UP      Chief Complaint:   Chief Complaint   Patient presents with    Follow-up     back pain         Interval Update:  Planned follow-up for a T11 compression fracture being managed in an LSO brace. She is doing well. She continues to wear her brace and today she endorses minimal back pain. She continues to deny any lower extremity radicular pain, weakness or sensory loss. HPI: The patient is a 79 y.o. female who presents for neurosurgical evaluation of thoracic back pain and a newly-diagnosed T11 compression fracture. She states she slipped trying to go to the bathroom and fell landing on her backside on  7/13/2021. She noted immediate lower-thoracic back pain. The next day, she presented to the ED in [facility] and x-rays and a CT of her thoracic and lumbar spine were obtained. These studies revealed an acute-appearing T11 compression fracture and she has been referred for neurosurgical evaluation. She continues to endorse back pain that she is treating with tylenol. The pain is worsened by movement. She denies any radicular pain and she denies any lower extremity numbness or weakness. She has chronic urinary incontinence and bladder issues and she self-catheterizes several times daily and she also uses a pessary. She was not provided any type of back brace or orthosis by the ED. ROS:    Constitutional: Negative. HENT: Negative. Eyes: Negative. Respiratory: Negative. Cardiovascular: Negative. Gastrointestinal: Negative. Genitourinary: Negative. Musculoskeletal: Positive for back pain. Skin: Negative. Neurological: Negative. Endo/Heme/Allergies: Negative. Psychiatric/Behavioral: Negative. Review of systems was obtained by the medical assistant and reviewed by myself.       Objective:    BP (!) 161/70   Pulse 64   Ht 5' 2\" (1.575 m)   Wt 137 lb (62.1 kg)   BMI 25.06 kg/m²         Physical Exam:    General: alert, cooperative, no distress  Cardiorespiratory: unlabored breathing      Neurologic Exam:    Mental Status: Alert, oriented, thought content appropriate  Cranial Nerves: PERRL, EOMI, symmetric facies, tongue midline  Motor: Motor exam is symmetrical 5 out of 5 all extremities bilaterally  Somatosensory: normal light touch sensation      Imaging:  Standing x-rays reviewed and compared to prior x-rays and CT scan. The x-rays reveal stable appearance of the compression fracture at T11 with ~50% anterior height loss. No additional height loss or kyphosis. Assessment and Plan:  80 y/o F returns for follow-up of a T11 compression fracture sustained 7/13/2021 after a fall in her bathroom. She has been managed non-operatively in an LSO brace and her pain has essentially resolved. I advised her that she may discontinue wearing her brace and I recommended that she begin a course of outpatient physical therapy for reconditioning her core strength. I will plan to see her again in six weeks with a final set of x-rays.       Electronically signed by Buzz May MD on 9/29/2021 at 12:00 PM

## 2022-07-24 ENCOUNTER — HOSPITAL ENCOUNTER (INPATIENT)
Facility: HOSPITAL | Age: 82
LOS: 2 days | Discharge: HOME HEALTH | End: 2022-07-26
Attending: STUDENT IN AN ORGANIZED HEALTH CARE EDUCATION/TRAINING PROGRAM | Admitting: INTERNAL MEDICINE

## 2022-07-24 ENCOUNTER — APPOINTMENT (OUTPATIENT)
Dept: GENERAL RADIOLOGY | Facility: HOSPITAL | Age: 82
End: 2022-07-24

## 2022-07-24 ENCOUNTER — APPOINTMENT (OUTPATIENT)
Dept: CARDIOLOGY | Facility: HOSPITAL | Age: 82
End: 2022-07-24

## 2022-07-24 ENCOUNTER — APPOINTMENT (OUTPATIENT)
Dept: CT IMAGING | Facility: HOSPITAL | Age: 82
End: 2022-07-24

## 2022-07-24 DIAGNOSIS — Z74.09 IMPAIRED MOBILITY AND ADLS: ICD-10-CM

## 2022-07-24 DIAGNOSIS — R07.9 CHEST PAIN, UNSPECIFIED TYPE: ICD-10-CM

## 2022-07-24 DIAGNOSIS — R77.8 ELEVATED TROPONIN: Primary | ICD-10-CM

## 2022-07-24 DIAGNOSIS — Z78.9 IMPAIRED MOBILITY AND ADLS: ICD-10-CM

## 2022-07-24 DIAGNOSIS — Z74.09 IMPAIRED FUNCTIONAL MOBILITY AND ACTIVITY TOLERANCE: ICD-10-CM

## 2022-07-24 PROBLEM — I21.4 NSTEMI (NON-ST ELEVATED MYOCARDIAL INFARCTION): Status: ACTIVE | Noted: 2022-07-24

## 2022-07-24 PROBLEM — U07.1 COVID-19 VIRUS DETECTED: Status: ACTIVE | Noted: 2022-07-24

## 2022-07-24 PROBLEM — Z87.898 HISTORY OF URINARY RETENTION: Status: ACTIVE | Noted: 2022-07-24

## 2022-07-24 PROBLEM — E87.1 HYPONATREMIA: Status: ACTIVE | Noted: 2022-07-24

## 2022-07-24 PROBLEM — R79.89 ELEVATED TROPONIN: Status: ACTIVE | Noted: 2022-07-24

## 2022-07-24 LAB
ALBUMIN SERPL-MCNC: 3.9 G/DL (ref 3.5–5.2)
ALBUMIN SERPL-MCNC: 3.9 G/DL (ref 3.5–5.2)
ALBUMIN/GLOB SERPL: 1.3 G/DL
ALBUMIN/GLOB SERPL: 1.3 G/DL
ALP SERPL-CCNC: 61 U/L (ref 39–117)
ALP SERPL-CCNC: 63 U/L (ref 39–117)
ALT SERPL W P-5'-P-CCNC: 15 U/L (ref 1–33)
ALT SERPL W P-5'-P-CCNC: 20 U/L (ref 1–33)
ANION GAP SERPL CALCULATED.3IONS-SCNC: 11 MMOL/L (ref 5–15)
ANION GAP SERPL CALCULATED.3IONS-SCNC: 12 MMOL/L (ref 5–15)
APTT PPP: 25.5 SECONDS (ref 24.1–35)
AST SERPL-CCNC: 38 U/L (ref 1–32)
AST SERPL-CCNC: 85 U/L (ref 1–32)
BASOPHILS # BLD AUTO: 0.01 10*3/MM3 (ref 0–0.2)
BASOPHILS # BLD AUTO: 0.01 10*3/MM3 (ref 0–0.2)
BASOPHILS NFR BLD AUTO: 0.2 % (ref 0–1.5)
BASOPHILS NFR BLD AUTO: 0.2 % (ref 0–1.5)
BH CV ECHO LEFT VENTRICLE GLOBAL LONGITUDINAL STRAIN: -14.1 %
BH CV ECHO MEAS - AO MAX PG: 4.6 MMHG
BH CV ECHO MEAS - AO MEAN PG: 2 MMHG
BH CV ECHO MEAS - AO ROOT DIAM: 3.1 CM
BH CV ECHO MEAS - AO V2 MAX: 107 CM/SEC
BH CV ECHO MEAS - AO V2 VTI: 20.1 CM
BH CV ECHO MEAS - AVA(I,D): 2.19 CM2
BH CV ECHO MEAS - EDV(CUBED): 94.8 ML
BH CV ECHO MEAS - EDV(MOD-SP4): 140 ML
BH CV ECHO MEAS - EF(MOD-SP4): 61.2 %
BH CV ECHO MEAS - EF_3D-VOL: 45 %
BH CV ECHO MEAS - ESV(CUBED): 30.7 ML
BH CV ECHO MEAS - ESV(MOD-SP4): 54.3 ML
BH CV ECHO MEAS - FS: 31.4 %
BH CV ECHO MEAS - IVS/LVPW: 1.05 CM
BH CV ECHO MEAS - IVSD: 1.25 CM
BH CV ECHO MEAS - LA A4C LENGTH: 71 CM
BH CV ECHO MEAS - LA DIMENSION: 4.1 CM
BH CV ECHO MEAS - LAT PEAK E' VEL: 3.9 CM/SEC
BH CV ECHO MEAS - LV DIASTOLIC VOL/BSA (35-75): 87.4 CM2
BH CV ECHO MEAS - LV MASS(C)D: 207.1 GRAMS
BH CV ECHO MEAS - LV MAX PG: 2.8 MMHG
BH CV ECHO MEAS - LV MEAN PG: 1 MMHG
BH CV ECHO MEAS - LV SYSTOLIC VOL/BSA (12-30): 33.9 CM2
BH CV ECHO MEAS - LV V1 MAX: 82.9 CM/SEC
BH CV ECHO MEAS - LV V1 VTI: 15.5 CM
BH CV ECHO MEAS - LVIDD: 4.6 CM
BH CV ECHO MEAS - LVIDS: 3.1 CM
BH CV ECHO MEAS - LVOT AREA: 2.8 CM2
BH CV ECHO MEAS - LVOT DIAM: 1.9 CM
BH CV ECHO MEAS - LVPWD: 1.19 CM
BH CV ECHO MEAS - MED PEAK E' VEL: 5.1 CM/SEC
BH CV ECHO MEAS - MV A MAX VEL: 52.3 CM/SEC
BH CV ECHO MEAS - MV DEC SLOPE: 309 CM/SEC2
BH CV ECHO MEAS - MV DEC TIME: 0.18 MSEC
BH CV ECHO MEAS - MV E MAX VEL: 68.1 CM/SEC
BH CV ECHO MEAS - MV E/A: 1.3
BH CV ECHO MEAS - MV P1/2T: 80.9 MSEC
BH CV ECHO MEAS - MVA(P1/2T): 2.7 CM2
BH CV ECHO MEAS - PA V2 MAX: 78.7 CM/SEC
BH CV ECHO MEAS - RAP SYSTOLE: 5 MMHG
BH CV ECHO MEAS - RVSP: 37.3 MMHG
BH CV ECHO MEAS - SI(MOD-SP4): 53.5 ML/M2
BH CV ECHO MEAS - SV(LVOT): 43.9 ML
BH CV ECHO MEAS - SV(MOD-SP4): 85.7 ML
BH CV ECHO MEAS - TR MAX PG: 32.3 MMHG
BH CV ECHO MEAS - TR MAX VEL: 284 CM/SEC
BH CV ECHO MEASUREMENTS AVERAGE E/E' RATIO: 15.13
BILIRUB SERPL-MCNC: 0.6 MG/DL (ref 0–1.2)
BILIRUB SERPL-MCNC: 0.6 MG/DL (ref 0–1.2)
BUN SERPL-MCNC: 10 MG/DL (ref 8–23)
BUN SERPL-MCNC: 8 MG/DL (ref 8–23)
BUN/CREAT SERPL: 12.1 (ref 7–25)
BUN/CREAT SERPL: 15.4 (ref 7–25)
CALCIUM SPEC-SCNC: 8.8 MG/DL (ref 8.6–10.5)
CALCIUM SPEC-SCNC: 8.9 MG/DL (ref 8.6–10.5)
CHLORIDE SERPL-SCNC: 92 MMOL/L (ref 98–107)
CHLORIDE SERPL-SCNC: 93 MMOL/L (ref 98–107)
CHOLEST SERPL-MCNC: 145 MG/DL (ref 0–200)
CO2 SERPL-SCNC: 22 MMOL/L (ref 22–29)
CO2 SERPL-SCNC: 25 MMOL/L (ref 22–29)
CREAT SERPL-MCNC: 0.65 MG/DL (ref 0.57–1)
CREAT SERPL-MCNC: 0.66 MG/DL (ref 0.57–1)
CRP SERPL-MCNC: <0.3 MG/DL (ref 0–0.5)
D DIMER PPP FEU-MCNC: 0.94 MCGFEU/ML (ref 0–0.5)
DEPRECATED RDW RBC AUTO: 41.7 FL (ref 37–54)
DEPRECATED RDW RBC AUTO: 42.5 FL (ref 37–54)
EGFRCR SERPLBLD CKD-EPI 2021: 87.7 ML/MIN/1.73
EGFRCR SERPLBLD CKD-EPI 2021: 88 ML/MIN/1.73
EOSINOPHIL # BLD AUTO: 0.01 10*3/MM3 (ref 0–0.4)
EOSINOPHIL # BLD AUTO: 0.01 10*3/MM3 (ref 0–0.4)
EOSINOPHIL NFR BLD AUTO: 0.2 % (ref 0.3–6.2)
EOSINOPHIL NFR BLD AUTO: 0.2 % (ref 0.3–6.2)
ERYTHROCYTE [DISTWIDTH] IN BLOOD BY AUTOMATED COUNT: 12.6 % (ref 12.3–15.4)
ERYTHROCYTE [DISTWIDTH] IN BLOOD BY AUTOMATED COUNT: 12.8 % (ref 12.3–15.4)
FERRITIN SERPL-MCNC: 698.9 NG/ML (ref 13–150)
GLOBULIN UR ELPH-MCNC: 2.9 GM/DL
GLOBULIN UR ELPH-MCNC: 3.1 GM/DL
GLUCOSE SERPL-MCNC: 111 MG/DL (ref 65–99)
GLUCOSE SERPL-MCNC: 118 MG/DL (ref 65–99)
HBA1C MFR BLD: 5.6 % (ref 4.8–5.6)
HCT VFR BLD AUTO: 33.2 % (ref 34–46.6)
HCT VFR BLD AUTO: 37.2 % (ref 34–46.6)
HDLC SERPL-MCNC: 49 MG/DL (ref 40–60)
HGB BLD-MCNC: 11.8 G/DL (ref 12–15.9)
HGB BLD-MCNC: 12.4 G/DL (ref 12–15.9)
IMM GRANULOCYTES # BLD AUTO: 0.01 10*3/MM3 (ref 0–0.05)
IMM GRANULOCYTES # BLD AUTO: 0.02 10*3/MM3 (ref 0–0.05)
IMM GRANULOCYTES NFR BLD AUTO: 0.2 % (ref 0–0.5)
IMM GRANULOCYTES NFR BLD AUTO: 0.4 % (ref 0–0.5)
INR PPP: 1.06 (ref 0.91–1.09)
LDLC SERPL CALC-MCNC: 85 MG/DL (ref 0–100)
LDLC/HDLC SERPL: 1.75 {RATIO}
LEFT ATRIUM VOLUME INDEX: 59.1 ML/M2
LV EF 2D ECHO EST: 45 %
LYMPHOCYTES # BLD AUTO: 0.67 10*3/MM3 (ref 0.7–3.1)
LYMPHOCYTES # BLD AUTO: 0.83 10*3/MM3 (ref 0.7–3.1)
LYMPHOCYTES NFR BLD AUTO: 15.4 % (ref 19.6–45.3)
LYMPHOCYTES NFR BLD AUTO: 17.5 % (ref 19.6–45.3)
MAGNESIUM SERPL-MCNC: 1.7 MG/DL (ref 1.6–2.4)
MAXIMAL PREDICTED HEART RATE: 138 BPM
MCH RBC QN AUTO: 30.5 PG (ref 26.6–33)
MCH RBC QN AUTO: 31.8 PG (ref 26.6–33)
MCHC RBC AUTO-ENTMCNC: 33.3 G/DL (ref 31.5–35.7)
MCHC RBC AUTO-ENTMCNC: 35.5 G/DL (ref 31.5–35.7)
MCV RBC AUTO: 89.5 FL (ref 79–97)
MCV RBC AUTO: 91.6 FL (ref 79–97)
MONOCYTES # BLD AUTO: 0.29 10*3/MM3 (ref 0.1–0.9)
MONOCYTES # BLD AUTO: 0.61 10*3/MM3 (ref 0.1–0.9)
MONOCYTES NFR BLD AUTO: 12.9 % (ref 5–12)
MONOCYTES NFR BLD AUTO: 6.7 % (ref 5–12)
NEUTROPHILS NFR BLD AUTO: 3.26 10*3/MM3 (ref 1.7–7)
NEUTROPHILS NFR BLD AUTO: 3.37 10*3/MM3 (ref 1.7–7)
NEUTROPHILS NFR BLD AUTO: 68.8 % (ref 42.7–76)
NEUTROPHILS NFR BLD AUTO: 77.3 % (ref 42.7–76)
NRBC BLD AUTO-RTO: 0 /100 WBC (ref 0–0.2)
NRBC BLD AUTO-RTO: 0 /100 WBC (ref 0–0.2)
PHOSPHATE SERPL-MCNC: 2.7 MG/DL (ref 2.5–4.5)
PLATELET # BLD AUTO: 231 10*3/MM3 (ref 140–450)
PLATELET # BLD AUTO: 250 10*3/MM3 (ref 140–450)
PMV BLD AUTO: 10.3 FL (ref 6–12)
PMV BLD AUTO: 10.6 FL (ref 6–12)
POTASSIUM SERPL-SCNC: 3.9 MMOL/L (ref 3.5–5.2)
POTASSIUM SERPL-SCNC: 4 MMOL/L (ref 3.5–5.2)
PROT SERPL-MCNC: 6.8 G/DL (ref 6–8.5)
PROT SERPL-MCNC: 7 G/DL (ref 6–8.5)
PROTHROMBIN TIME: 13.4 SECONDS (ref 11.9–14.6)
RBC # BLD AUTO: 3.71 10*6/MM3 (ref 3.77–5.28)
RBC # BLD AUTO: 4.06 10*6/MM3 (ref 3.77–5.28)
SODIUM SERPL-SCNC: 127 MMOL/L (ref 136–145)
SODIUM SERPL-SCNC: 128 MMOL/L (ref 136–145)
STRESS TARGET HR: 117 BPM
TRIGL SERPL-MCNC: 52 MG/DL (ref 0–150)
TROPONIN T SERPL-MCNC: 0.37 NG/ML (ref 0–0.03)
TROPONIN T SERPL-MCNC: 0.83 NG/ML (ref 0–0.03)
TROPONIN T SERPL-MCNC: 1.62 NG/ML (ref 0–0.03)
TROPONIN T SERPL-MCNC: 2.54 NG/ML (ref 0–0.03)
TROPONIN T SERPL-MCNC: 2.55 NG/ML (ref 0–0.03)
VLDLC SERPL-MCNC: 11 MG/DL (ref 5–40)
WBC NRBC COR # BLD: 4.36 10*3/MM3 (ref 3.4–10.8)
WBC NRBC COR # BLD: 4.74 10*3/MM3 (ref 3.4–10.8)

## 2022-07-24 PROCEDURE — 51702 INSERT TEMP BLADDER CATH: CPT

## 2022-07-24 PROCEDURE — 93356 MYOCRD STRAIN IMG SPCKL TRCK: CPT | Performed by: INTERNAL MEDICINE

## 2022-07-24 PROCEDURE — 25010000002 ENOXAPARIN PER 10 MG: Performed by: INTERNAL MEDICINE

## 2022-07-24 PROCEDURE — 86140 C-REACTIVE PROTEIN: CPT | Performed by: FAMILY MEDICINE

## 2022-07-24 PROCEDURE — 83036 HEMOGLOBIN GLYCOSYLATED A1C: CPT | Performed by: INTERNAL MEDICINE

## 2022-07-24 PROCEDURE — 80053 COMPREHEN METABOLIC PANEL: CPT | Performed by: STUDENT IN AN ORGANIZED HEALTH CARE EDUCATION/TRAINING PROGRAM

## 2022-07-24 PROCEDURE — 83735 ASSAY OF MAGNESIUM: CPT | Performed by: INTERNAL MEDICINE

## 2022-07-24 PROCEDURE — 99222 1ST HOSP IP/OBS MODERATE 55: CPT | Performed by: INTERNAL MEDICINE

## 2022-07-24 PROCEDURE — 25010000002 ENOXAPARIN PER 10 MG: Performed by: STUDENT IN AN ORGANIZED HEALTH CARE EDUCATION/TRAINING PROGRAM

## 2022-07-24 PROCEDURE — 80053 COMPREHEN METABOLIC PANEL: CPT | Performed by: INTERNAL MEDICINE

## 2022-07-24 PROCEDURE — 85025 COMPLETE CBC W/AUTO DIFF WBC: CPT | Performed by: STUDENT IN AN ORGANIZED HEALTH CARE EDUCATION/TRAINING PROGRAM

## 2022-07-24 PROCEDURE — 25010000002 ENOXAPARIN PER 10 MG: Performed by: FAMILY MEDICINE

## 2022-07-24 PROCEDURE — 36415 COLL VENOUS BLD VENIPUNCTURE: CPT

## 2022-07-24 PROCEDURE — 84484 ASSAY OF TROPONIN QUANT: CPT | Performed by: STUDENT IN AN ORGANIZED HEALTH CARE EDUCATION/TRAINING PROGRAM

## 2022-07-24 PROCEDURE — 25010000002 ENOXAPARIN PER 10 MG: Performed by: PHYSICIAN ASSISTANT

## 2022-07-24 PROCEDURE — 84484 ASSAY OF TROPONIN QUANT: CPT | Performed by: INTERNAL MEDICINE

## 2022-07-24 PROCEDURE — 80061 LIPID PANEL: CPT | Performed by: INTERNAL MEDICINE

## 2022-07-24 PROCEDURE — 82728 ASSAY OF FERRITIN: CPT | Performed by: FAMILY MEDICINE

## 2022-07-24 PROCEDURE — 85610 PROTHROMBIN TIME: CPT | Performed by: STUDENT IN AN ORGANIZED HEALTH CARE EDUCATION/TRAINING PROGRAM

## 2022-07-24 PROCEDURE — 85025 COMPLETE CBC W/AUTO DIFF WBC: CPT | Performed by: INTERNAL MEDICINE

## 2022-07-24 PROCEDURE — 84484 ASSAY OF TROPONIN QUANT: CPT | Performed by: FAMILY MEDICINE

## 2022-07-24 PROCEDURE — 99284 EMERGENCY DEPT VISIT MOD MDM: CPT

## 2022-07-24 PROCEDURE — 71275 CT ANGIOGRAPHY CHEST: CPT

## 2022-07-24 PROCEDURE — 71045 X-RAY EXAM CHEST 1 VIEW: CPT

## 2022-07-24 PROCEDURE — 85730 THROMBOPLASTIN TIME PARTIAL: CPT | Performed by: STUDENT IN AN ORGANIZED HEALTH CARE EDUCATION/TRAINING PROGRAM

## 2022-07-24 PROCEDURE — 93005 ELECTROCARDIOGRAM TRACING: CPT | Performed by: STUDENT IN AN ORGANIZED HEALTH CARE EDUCATION/TRAINING PROGRAM

## 2022-07-24 PROCEDURE — 0 IOPAMIDOL PER 1 ML: Performed by: STUDENT IN AN ORGANIZED HEALTH CARE EDUCATION/TRAINING PROGRAM

## 2022-07-24 PROCEDURE — 85379 FIBRIN DEGRADATION QUANT: CPT | Performed by: STUDENT IN AN ORGANIZED HEALTH CARE EDUCATION/TRAINING PROGRAM

## 2022-07-24 PROCEDURE — 93010 ELECTROCARDIOGRAM REPORT: CPT | Performed by: INTERNAL MEDICINE

## 2022-07-24 PROCEDURE — 93306 TTE W/DOPPLER COMPLETE: CPT

## 2022-07-24 PROCEDURE — 93356 MYOCRD STRAIN IMG SPCKL TRCK: CPT

## 2022-07-24 PROCEDURE — 84100 ASSAY OF PHOSPHORUS: CPT | Performed by: INTERNAL MEDICINE

## 2022-07-24 PROCEDURE — 93306 TTE W/DOPPLER COMPLETE: CPT | Performed by: INTERNAL MEDICINE

## 2022-07-24 RX ORDER — CARVEDILOL 3.12 MG/1
3.12 TABLET ORAL 2 TIMES DAILY WITH MEALS
Status: DISCONTINUED | OUTPATIENT
Start: 2022-07-24 | End: 2022-07-26 | Stop reason: HOSPADM

## 2022-07-24 RX ORDER — LATANOPROST 50 UG/ML
1 SOLUTION/ DROPS OPHTHALMIC NIGHTLY
Status: DISCONTINUED | OUTPATIENT
Start: 2022-07-24 | End: 2022-07-24

## 2022-07-24 RX ORDER — SODIUM CHLORIDE 0.9 % (FLUSH) 0.9 %
10 SYRINGE (ML) INJECTION EVERY 12 HOURS SCHEDULED
Status: DISCONTINUED | OUTPATIENT
Start: 2022-07-24 | End: 2022-07-26 | Stop reason: HOSPADM

## 2022-07-24 RX ORDER — SODIUM CHLORIDE 9 MG/ML
75 INJECTION, SOLUTION INTRAVENOUS CONTINUOUS
Status: DISPENSED | OUTPATIENT
Start: 2022-07-24 | End: 2022-07-24

## 2022-07-24 RX ORDER — ATORVASTATIN CALCIUM 40 MG/1
40 TABLET, FILM COATED ORAL NIGHTLY
Status: DISCONTINUED | OUTPATIENT
Start: 2022-07-24 | End: 2022-07-26 | Stop reason: HOSPADM

## 2022-07-24 RX ORDER — ASPIRIN 81 MG/1
81 TABLET, CHEWABLE ORAL DAILY
Status: DISCONTINUED | OUTPATIENT
Start: 2022-07-24 | End: 2022-07-26 | Stop reason: HOSPADM

## 2022-07-24 RX ORDER — ASCORBIC ACID 500 MG
500 TABLET ORAL DAILY
Status: DISCONTINUED | OUTPATIENT
Start: 2022-07-24 | End: 2022-07-26 | Stop reason: HOSPADM

## 2022-07-24 RX ORDER — MONTELUKAST SODIUM 10 MG/1
10 TABLET ORAL NIGHTLY
Status: DISCONTINUED | OUTPATIENT
Start: 2022-07-24 | End: 2022-07-26 | Stop reason: HOSPADM

## 2022-07-24 RX ORDER — ENOXAPARIN SODIUM 100 MG/ML
1 INJECTION SUBCUTANEOUS EVERY 12 HOURS SCHEDULED
Status: DISCONTINUED | OUTPATIENT
Start: 2022-07-24 | End: 2022-07-26 | Stop reason: HOSPADM

## 2022-07-24 RX ORDER — ENOXAPARIN SODIUM 100 MG/ML
1 INJECTION SUBCUTANEOUS ONCE
Status: COMPLETED | OUTPATIENT
Start: 2022-07-24 | End: 2022-07-24

## 2022-07-24 RX ORDER — ENOXAPARIN SODIUM 100 MG/ML
1 INJECTION SUBCUTANEOUS EVERY 12 HOURS SCHEDULED
Status: DISCONTINUED | OUTPATIENT
Start: 2022-07-24 | End: 2022-07-24

## 2022-07-24 RX ORDER — LATANOPROST 50 UG/ML
1 SOLUTION/ DROPS OPHTHALMIC NIGHTLY
Status: DISCONTINUED | OUTPATIENT
Start: 2022-07-25 | End: 2022-07-26 | Stop reason: HOSPADM

## 2022-07-24 RX ORDER — SODIUM CHLORIDE 0.9 % (FLUSH) 0.9 %
10 SYRINGE (ML) INJECTION AS NEEDED
Status: DISCONTINUED | OUTPATIENT
Start: 2022-07-24 | End: 2022-07-26 | Stop reason: HOSPADM

## 2022-07-24 RX ORDER — ACETAMINOPHEN 325 MG/1
650 TABLET ORAL EVERY 4 HOURS PRN
Status: DISCONTINUED | OUTPATIENT
Start: 2022-07-24 | End: 2022-07-26 | Stop reason: HOSPADM

## 2022-07-24 RX ORDER — CLOPIDOGREL BISULFATE 75 MG/1
300 TABLET ORAL ONCE
Status: COMPLETED | OUTPATIENT
Start: 2022-07-24 | End: 2022-07-24

## 2022-07-24 RX ORDER — LISINOPRIL 20 MG/1
40 TABLET ORAL DAILY
Status: DISCONTINUED | OUTPATIENT
Start: 2022-07-24 | End: 2022-07-24

## 2022-07-24 RX ORDER — ZINC SULFATE 50(220)MG
220 CAPSULE ORAL DAILY
Status: DISCONTINUED | OUTPATIENT
Start: 2022-07-24 | End: 2022-07-26 | Stop reason: HOSPADM

## 2022-07-24 RX ORDER — ENOXAPARIN SODIUM 100 MG/ML
30 INJECTION SUBCUTANEOUS ONCE
Status: COMPLETED | OUTPATIENT
Start: 2022-07-24 | End: 2022-07-24

## 2022-07-24 RX ORDER — ENOXAPARIN SODIUM 100 MG/ML
30 INJECTION SUBCUTANEOUS EVERY 24 HOURS
Status: DISCONTINUED | OUTPATIENT
Start: 2022-07-24 | End: 2022-07-24

## 2022-07-24 RX ORDER — CLOPIDOGREL BISULFATE 75 MG/1
75 TABLET ORAL DAILY
Status: DISCONTINUED | OUTPATIENT
Start: 2022-07-25 | End: 2022-07-26 | Stop reason: HOSPADM

## 2022-07-24 RX ORDER — ONDANSETRON 2 MG/ML
4 INJECTION INTRAMUSCULAR; INTRAVENOUS EVERY 6 HOURS PRN
Status: DISCONTINUED | OUTPATIENT
Start: 2022-07-24 | End: 2022-07-26 | Stop reason: HOSPADM

## 2022-07-24 RX ADMIN — SODIUM CHLORIDE 75 ML/HR: 9 INJECTION, SOLUTION INTRAVENOUS at 04:55

## 2022-07-24 RX ADMIN — OXYCODONE HYDROCHLORIDE AND ACETAMINOPHEN 500 MG: 500 TABLET ORAL at 08:58

## 2022-07-24 RX ADMIN — ASPIRIN 81 MG: 81 TABLET, CHEWABLE ORAL at 10:37

## 2022-07-24 RX ADMIN — ENOXAPARIN SODIUM 60 MG: 100 INJECTION SUBCUTANEOUS at 22:01

## 2022-07-24 RX ADMIN — ZINC SULFATE 220 MG (50 MG) CAPSULE 220 MG: CAPSULE at 08:58

## 2022-07-24 RX ADMIN — ATORVASTATIN CALCIUM 40 MG: 40 TABLET, FILM COATED ORAL at 21:58

## 2022-07-24 RX ADMIN — LISINOPRIL 40 MG: 20 TABLET ORAL at 08:58

## 2022-07-24 RX ADMIN — Medication 10 ML: at 21:59

## 2022-07-24 RX ADMIN — IOPAMIDOL 70 ML: 755 INJECTION, SOLUTION INTRAVENOUS at 02:11

## 2022-07-24 RX ADMIN — ENOXAPARIN SODIUM 60 MG: 100 INJECTION SUBCUTANEOUS at 02:18

## 2022-07-24 RX ADMIN — ENOXAPARIN SODIUM 30 MG: 100 INJECTION SUBCUTANEOUS at 10:37

## 2022-07-24 RX ADMIN — LATANOPROST 1 DROP: 50 SOLUTION/ DROPS OPHTHALMIC at 21:59

## 2022-07-24 RX ADMIN — MONTELUKAST SODIUM 10 MG: 10 TABLET, FILM COATED ORAL at 21:58

## 2022-07-24 RX ADMIN — CARVEDILOL 3.12 MG: 3.12 TABLET, FILM COATED ORAL at 10:34

## 2022-07-24 RX ADMIN — ENOXAPARIN SODIUM 30 MG: 100 INJECTION SUBCUTANEOUS at 04:55

## 2022-07-24 RX ADMIN — CARVEDILOL 3.12 MG: 3.12 TABLET, FILM COATED ORAL at 17:31

## 2022-07-24 RX ADMIN — CLOPIDOGREL 300 MG: 75 TABLET, FILM COATED ORAL at 17:31

## 2022-07-25 LAB
ALBUMIN SERPL-MCNC: 3.3 G/DL (ref 3.5–5.2)
ALBUMIN/GLOB SERPL: 1.2 G/DL
ALP SERPL-CCNC: 58 U/L (ref 39–117)
ALT SERPL W P-5'-P-CCNC: 20 U/L (ref 1–33)
ANION GAP SERPL CALCULATED.3IONS-SCNC: 9 MMOL/L (ref 5–15)
AST SERPL-CCNC: 89 U/L (ref 1–32)
BASOPHILS # BLD AUTO: 0.01 10*3/MM3 (ref 0–0.2)
BASOPHILS NFR BLD AUTO: 0.2 % (ref 0–1.5)
BILIRUB SERPL-MCNC: 0.6 MG/DL (ref 0–1.2)
BUN SERPL-MCNC: 8 MG/DL (ref 8–23)
BUN/CREAT SERPL: 13.3 (ref 7–25)
CALCIUM SPEC-SCNC: 8.5 MG/DL (ref 8.6–10.5)
CHLORIDE SERPL-SCNC: 93 MMOL/L (ref 98–107)
CO2 SERPL-SCNC: 23 MMOL/L (ref 22–29)
CREAT SERPL-MCNC: 0.6 MG/DL (ref 0.57–1)
CRP SERPL-MCNC: 0.37 MG/DL (ref 0–0.5)
DEPRECATED RDW RBC AUTO: 41.9 FL (ref 37–54)
EGFRCR SERPLBLD CKD-EPI 2021: 89.7 ML/MIN/1.73
EOSINOPHIL # BLD AUTO: 0.04 10*3/MM3 (ref 0–0.4)
EOSINOPHIL NFR BLD AUTO: 0.8 % (ref 0.3–6.2)
ERYTHROCYTE [DISTWIDTH] IN BLOOD BY AUTOMATED COUNT: 12.5 % (ref 12.3–15.4)
FERRITIN SERPL-MCNC: 671.2 NG/ML (ref 13–150)
GLOBULIN UR ELPH-MCNC: 2.8 GM/DL
GLUCOSE SERPL-MCNC: 119 MG/DL (ref 65–99)
HCT VFR BLD AUTO: 33.6 % (ref 34–46.6)
HGB BLD-MCNC: 11.2 G/DL (ref 12–15.9)
IMM GRANULOCYTES # BLD AUTO: 0.01 10*3/MM3 (ref 0–0.05)
IMM GRANULOCYTES NFR BLD AUTO: 0.2 % (ref 0–0.5)
LYMPHOCYTES # BLD AUTO: 1.22 10*3/MM3 (ref 0.7–3.1)
LYMPHOCYTES NFR BLD AUTO: 23.1 % (ref 19.6–45.3)
MCH RBC QN AUTO: 30.4 PG (ref 26.6–33)
MCHC RBC AUTO-ENTMCNC: 33.3 G/DL (ref 31.5–35.7)
MCV RBC AUTO: 91.3 FL (ref 79–97)
MONOCYTES # BLD AUTO: 0.68 10*3/MM3 (ref 0.1–0.9)
MONOCYTES NFR BLD AUTO: 12.9 % (ref 5–12)
NEUTROPHILS NFR BLD AUTO: 3.31 10*3/MM3 (ref 1.7–7)
NEUTROPHILS NFR BLD AUTO: 62.8 % (ref 42.7–76)
NRBC BLD AUTO-RTO: 0 /100 WBC (ref 0–0.2)
PLATELET # BLD AUTO: 232 10*3/MM3 (ref 140–450)
PMV BLD AUTO: 10.6 FL (ref 6–12)
POTASSIUM SERPL-SCNC: 3.6 MMOL/L (ref 3.5–5.2)
PROT SERPL-MCNC: 6.1 G/DL (ref 6–8.5)
QT INTERVAL: 448 MS
QT INTERVAL: 466 MS
QTC INTERVAL: 493 MS
QTC INTERVAL: 503 MS
RBC # BLD AUTO: 3.68 10*6/MM3 (ref 3.77–5.28)
SODIUM SERPL-SCNC: 125 MMOL/L (ref 136–145)
TROPONIN T SERPL-MCNC: 2.28 NG/ML (ref 0–0.03)
WBC NRBC COR # BLD: 5.27 10*3/MM3 (ref 3.4–10.8)

## 2022-07-25 PROCEDURE — 80053 COMPREHEN METABOLIC PANEL: CPT | Performed by: FAMILY MEDICINE

## 2022-07-25 PROCEDURE — 86140 C-REACTIVE PROTEIN: CPT | Performed by: FAMILY MEDICINE

## 2022-07-25 PROCEDURE — 93010 ELECTROCARDIOGRAM REPORT: CPT | Performed by: INTERNAL MEDICINE

## 2022-07-25 PROCEDURE — 25010000002 ENOXAPARIN PER 10 MG: Performed by: FAMILY MEDICINE

## 2022-07-25 PROCEDURE — 82728 ASSAY OF FERRITIN: CPT | Performed by: FAMILY MEDICINE

## 2022-07-25 PROCEDURE — 93005 ELECTROCARDIOGRAM TRACING: CPT | Performed by: INTERNAL MEDICINE

## 2022-07-25 PROCEDURE — 97166 OT EVAL MOD COMPLEX 45 MIN: CPT

## 2022-07-25 PROCEDURE — 99232 SBSQ HOSP IP/OBS MODERATE 35: CPT | Performed by: NURSE PRACTITIONER

## 2022-07-25 PROCEDURE — 85025 COMPLETE CBC W/AUTO DIFF WBC: CPT | Performed by: FAMILY MEDICINE

## 2022-07-25 PROCEDURE — 97162 PT EVAL MOD COMPLEX 30 MIN: CPT

## 2022-07-25 RX ADMIN — Medication 10 ML: at 09:17

## 2022-07-25 RX ADMIN — OXYCODONE HYDROCHLORIDE AND ACETAMINOPHEN 500 MG: 500 TABLET ORAL at 09:16

## 2022-07-25 RX ADMIN — CARVEDILOL 3.12 MG: 3.12 TABLET, FILM COATED ORAL at 17:32

## 2022-07-25 RX ADMIN — ASPIRIN 81 MG: 81 TABLET, CHEWABLE ORAL at 09:16

## 2022-07-25 RX ADMIN — ZINC SULFATE 220 MG (50 MG) CAPSULE 220 MG: CAPSULE at 09:16

## 2022-07-25 RX ADMIN — ENOXAPARIN SODIUM 60 MG: 100 INJECTION SUBCUTANEOUS at 09:16

## 2022-07-25 RX ADMIN — CARVEDILOL 3.12 MG: 3.12 TABLET, FILM COATED ORAL at 09:16

## 2022-07-25 RX ADMIN — ATORVASTATIN CALCIUM 40 MG: 40 TABLET, FILM COATED ORAL at 22:04

## 2022-07-25 RX ADMIN — MONTELUKAST SODIUM 10 MG: 10 TABLET, FILM COATED ORAL at 22:04

## 2022-07-25 RX ADMIN — ENOXAPARIN SODIUM 60 MG: 100 INJECTION SUBCUTANEOUS at 22:05

## 2022-07-25 RX ADMIN — Medication 10 ML: at 22:04

## 2022-07-25 RX ADMIN — LATANOPROST 1 DROP: 50 SOLUTION/ DROPS OPHTHALMIC at 22:05

## 2022-07-25 RX ADMIN — CLOPIDOGREL 75 MG: 75 TABLET, FILM COATED ORAL at 11:07

## 2022-07-26 ENCOUNTER — READMISSION MANAGEMENT (OUTPATIENT)
Dept: CALL CENTER | Facility: HOSPITAL | Age: 82
End: 2022-07-26

## 2022-07-26 VITALS
TEMPERATURE: 96.8 F | HEIGHT: 62 IN | BODY MASS INDEX: 24.92 KG/M2 | RESPIRATION RATE: 18 BRPM | SYSTOLIC BLOOD PRESSURE: 135 MMHG | WEIGHT: 135.4 LBS | DIASTOLIC BLOOD PRESSURE: 78 MMHG | HEART RATE: 68 BPM | OXYGEN SATURATION: 99 %

## 2022-07-26 PROCEDURE — 97116 GAIT TRAINING THERAPY: CPT

## 2022-07-26 PROCEDURE — 97110 THERAPEUTIC EXERCISES: CPT

## 2022-07-26 PROCEDURE — 25010000002 ENOXAPARIN PER 10 MG: Performed by: FAMILY MEDICINE

## 2022-07-26 RX ORDER — ATORVASTATIN CALCIUM 40 MG/1
40 TABLET, FILM COATED ORAL NIGHTLY
Qty: 90 TABLET | Refills: 0 | Status: SHIPPED | OUTPATIENT
Start: 2022-07-26 | End: 2022-10-24

## 2022-07-26 RX ORDER — ASCORBIC ACID 500 MG
500 TABLET ORAL DAILY
Qty: 30 TABLET | Refills: 0 | Status: SHIPPED | OUTPATIENT
Start: 2022-07-27 | End: 2022-08-26

## 2022-07-26 RX ORDER — CARVEDILOL 3.12 MG/1
3.12 TABLET ORAL 2 TIMES DAILY WITH MEALS
Qty: 180 TABLET | Refills: 0 | Status: SHIPPED | OUTPATIENT
Start: 2022-07-26 | End: 2024-01-01

## 2022-07-26 RX ORDER — CLOPIDOGREL BISULFATE 75 MG/1
75 TABLET ORAL DAILY
Qty: 90 TABLET | Refills: 0 | Status: SHIPPED | OUTPATIENT
Start: 2022-07-27 | End: 2022-10-25

## 2022-07-26 RX ORDER — ASPIRIN 81 MG/1
81 TABLET, CHEWABLE ORAL DAILY
Qty: 90 TABLET | Refills: 0 | Status: SHIPPED | OUTPATIENT
Start: 2022-07-27 | End: 2022-10-25

## 2022-07-26 RX ORDER — ZINC SULFATE 50(220)MG
220 CAPSULE ORAL DAILY
Qty: 30 CAPSULE | Refills: 0 | Status: SHIPPED | OUTPATIENT
Start: 2022-07-27 | End: 2022-08-26

## 2022-07-26 RX ORDER — LOSARTAN POTASSIUM 25 MG/1
25 TABLET ORAL DAILY
Qty: 30 TABLET | Refills: 0 | Status: SHIPPED | OUTPATIENT
Start: 2022-07-26 | End: 2023-01-18

## 2022-07-26 RX ADMIN — CARVEDILOL 3.12 MG: 3.12 TABLET, FILM COATED ORAL at 08:58

## 2022-07-26 RX ADMIN — ZINC SULFATE 220 MG (50 MG) CAPSULE 220 MG: CAPSULE at 08:58

## 2022-07-26 RX ADMIN — ASPIRIN 81 MG: 81 TABLET, CHEWABLE ORAL at 08:58

## 2022-07-26 RX ADMIN — ENOXAPARIN SODIUM 60 MG: 100 INJECTION SUBCUTANEOUS at 08:58

## 2022-07-26 RX ADMIN — OXYCODONE HYDROCHLORIDE AND ACETAMINOPHEN 500 MG: 500 TABLET ORAL at 08:58

## 2022-07-26 RX ADMIN — Medication 10 ML: at 08:58

## 2022-07-26 RX ADMIN — CLOPIDOGREL 75 MG: 75 TABLET, FILM COATED ORAL at 08:58

## 2022-07-27 ENCOUNTER — HOME HEALTH ADMISSION (OUTPATIENT)
Dept: HOME HEALTH SERVICES | Facility: HOME HEALTHCARE | Age: 82
End: 2022-07-27

## 2022-07-27 ENCOUNTER — READMISSION MANAGEMENT (OUTPATIENT)
Dept: CALL CENTER | Facility: HOSPITAL | Age: 82
End: 2022-07-27

## 2022-07-27 NOTE — OUTREACH NOTE
Prep Survey    Flowsheet Row Responses   [facility] facility patient discharged from? [place]   Is LACE score < 7 ? Yes   Emergency Room discharge w/ pulse ox? No   Eligibility Readm Mgmt   Discharge diagnosis NSTEMI, Covid-19   Does the patient have one of the following disease processes/diagnoses(primary or secondary)? COVID-19   Does the patient have Home health ordered? Yes   What is the Home health agency?  [facility]   Is there a DME ordered? No   Prep survey completed? Yes          VIOLETA JOHNSTON - Registered Nurse

## 2022-07-27 NOTE — OUTREACH NOTE
COVID-19 Week 1 Survey    Flowsheet Row Responses   [facility] patient discharged from? [place]   Does the patient have one of the following disease processes/diagnoses(primary or secondary)? COVID-19   COVID-19 underlying condition? AMI   Call Number Call 1   Week 1 Call successful? Yes   Call start time [phone]   Call end time [phone]   General alerts for this patient If patient doesn't answer please call daughter    Discharge diagnosis NSTEMI, Covid-19   Person spoke with today (if not patient) and relationship Pt and daughter    Meds reviewed with patient/caregiver? Yes   Is the patient having any side effects they believe may be caused by any medication additions or changes? Yes   Side effects comments  Pt has diarrhea and she's vomiting   Does the patient have all medications ordered at discharge? Yes   Is the patient taking all medications as directed (includes completed medication regime)? Yes   Comments regarding appointments Cards appt is on 8/17/22   Does the patient have a primary care provider?  Yes   Does the patient have an appointment with their PCP or specialist within 7 days of discharge? No   What is preventing the patient from scheduling follow up appointments within 7 days of discharge? Waiting on return call   Nursing Interventions Advised patient to make appointment   Has the patient kept scheduled appointments due by today? N/A   What is the Home health agency?  [facility]   Has home health visited the patient within 72 hours of discharge? Call prior to 72 hours   Home health comments  should visit on 8/1/22 or 8/22/22   Psychosocial issues? No   Did the patient receive a copy of their discharge instructions? Yes   Did the patient receive a copy of COVID-19 specific instructions? Yes   Nursing interventions Reviewed instructions with patient   What is the patient's perception of their health status since discharge? Same  [vomiting ]   Does the patient have any of the  following symptoms? None  [tired]   Nursing Interventions Nurse provided patient education   Pulse Ox monitoring None   Is the patient/caregiver able to teach back steps to recovery at home? Rest and rebuild strength, gradually increase activity, Eat a well-balance diet   If the patient is a current smoker, are they able to teach back resources for cessation? Not a smoker   Is the patient/caregiver able to teach back the hierarchy of who to call/visit for symptoms/problems? PCP, Specialist, Home health nurse, Urgent Care, ED, 911 Yes   Is the patient/caregiver able to teach back ways to prevent a second heart attack: Take medications, Follow up with MD, Manage risk factors   Is the patient/caregiver able to teach back lifestyle changes to help prevent MIs Quit smoking, Reducing stress, Managing diabetes, Heart healthy diet   Is the patient/caregiver able to teach back signs and symptoms of when to call for help immediately: Sudden chest discomfort, Sudden discomfort in arms, back, neck or jaw, Shortness of breath at any time, Sudden sweating or clammy skin   COVID-19 call completed? Yes   Wrap up additional comments Updated phone numbers          BOB JAIN - Registered Nurse

## 2022-07-29 ENCOUNTER — READMISSION MANAGEMENT (OUTPATIENT)
Dept: CALL CENTER | Facility: HOSPITAL | Age: 82
End: 2022-07-29

## 2022-07-29 NOTE — OUTREACH NOTE
COVID-19 Week 1 Survey    Flowsheet Row Responses   [facility] facility patient discharged from? [place]   Does the patient have one of the following disease processes/diagnoses(primary or secondary)? COVID-19   COVID-19 underlying condition? AMI   Call Number Call 2   Week 1 Call successful? No   Discharge diagnosis NSTEMI, Covid-19          ARIK BARNES - Licensed Nurse

## 2022-08-01 ENCOUNTER — HOME CARE VISIT (OUTPATIENT)
Dept: HOME HEALTH SERVICES | Facility: CLINIC | Age: 82
End: 2022-08-01

## 2022-08-01 VITALS
DIASTOLIC BLOOD PRESSURE: 56 MMHG | RESPIRATION RATE: 18 BRPM | TEMPERATURE: 97.6 F | SYSTOLIC BLOOD PRESSURE: 112 MMHG | OXYGEN SATURATION: 100 % | HEART RATE: 58 BPM

## 2022-08-01 PROCEDURE — G0151 HHCP-SERV OF PT,EA 15 MIN: HCPCS

## 2022-08-01 PROCEDURE — G0152 HHCP-SERV OF OT,EA 15 MIN: HCPCS

## 2022-08-01 NOTE — HOME HEALTH
REASON FOR REFERRAL:  80 Y/O FEMALE TAKEN TO ED AT [facility] 7/24/22 WITH COMPLAINTS OF CHEST PAIN.  SHE WAS TRANSFERRED  AND WAS HOSPITALIZED AT [facility] 7/24/22-7/26/22 FOR NSTEMI, COVID 19 VIRUS POSITIVE ON 7/23/22, AND HYPONATREMIA.  PATIENT REPORTED THAT SHE IS WEAK AND HAS NOT ATTEMPTED TO BATH SINCE COMING HOME AT THIS TIME.  HER DAUGHTER IS UNABLE TO ASSIST HER WITH BATHING DUE TO A BACK PROBLEM.  PATIENT REPORTED THAT SHE WANTS TO WORK WITH OT ON REGAINING HER ADL INDEPENDENCE.     PMH:  HTN, ARTHRITIS, URINARY RETENTION WITH CHRONIC SELF CATH REQUIRED, APPENDECTOMY, HYSTERECTOMY, R RAY.     PLOF: INDEPENDENT WITH ADLS, FUNCTIONAL TRANSFERS/FUNCTIONAL MOBILITY WITHOUT AD.       HOME ENVIRONMENT:  PATIENT LIVES IN ONE LEVEL APARTMENT WITH HER DAUGHTER.  SHE HAS ONE STEP UP INTO APARTMENT TO ENTER/EXIT.       PRECAUTIONS:  HIGH FALL RISK, HARD OF HEARING.     MD APPTS: 8/17/22 GÓMEZ KANG APRN     DME: RW, SHOWER CHAIR IN TUB/SHOWER COMBO WITH CURTAIN     AROM B UES: WFL     STRENGTH B UES: 4/5 GROSSLY THROUGHOUT.     HAND DOMINANCE: RIGHT HAND DOMINANT, B  STRENGTHS: 4+/5.     REHAB POTENTIAL: GOOD FOR GOALS     WISH TO ADDRESS BATH/DRESSING WITH OT: YES     PATIENT'S GOAL: RETURN TO BEING INDEPENDENT WITH ADLS.    MEDICAL NECISSITY:  PATIENT PRESENTS WITH WEAKNESS, LIMITED ACTIVITY TOLERANCE, FALL RISK, AND DECREASED ADL INDEPENDENCE AFTER RECENT HOSPITALIZATION FOR NSTEMI AND COVID 19.  SHE REQUIRES SKILLED HOME BASED OT SERVICES FOR FUNCTIONAL TRANSFER TRAINING FOR ADLS, SAFETY EDUCATION/TRAINING, ADL RETRAINING FOR BATHING/DRESSING, STANDING TOLERANCE FOR ADLS/IADLS.

## 2022-08-02 VITALS
OXYGEN SATURATION: 100 % | DIASTOLIC BLOOD PRESSURE: 56 MMHG | HEART RATE: 58 BPM | RESPIRATION RATE: 17 BRPM | SYSTOLIC BLOOD PRESSURE: 110 MMHG | TEMPERATURE: 96.6 F

## 2022-08-02 NOTE — HOME HEALTH
"REASON FOR REFERRAL: PATIENT WAS HOSPITALIZED 7/24/2022- 7/26/2022 SECONDARY TO NSTEMI, COVID 19, HYPONATREMIA, URINARY RETENTION. PATIENT WAS HAVING INCREASED CHEST PAIN WHEN SHE PRESETED TO THE ER. PATIENT WAS THEN SENT TO AN OUTSIDE HOSPITAL [facility] SECONDARY TO HER ELEVATED TROPONIN. SHE WAS FOUDN TO BE COVID POSITIVE BUT ASYMPTOMATIC. SHE HAD BEEN ILL A FEW WEEKS PRIOR WITH SOME STOMACH ISSUES BUT NEVER TESTED AT THAT TIME. PATIENT IS DOING WELL SINCE COMING HOME JUST HAVING A LITTLE BIT OF INCREASED WEAKNESS AND LIMITED BALANCE SINCE HOSPITALIZATION    PMHX: HTN, URINARY RETENTION WITH SELF CATHTERIZATION, MULTIPLE UTI  PRIOR LEVEL OF FUNCTION: PATIENT LIVES WITH HER DAUGTHER BUT WAS INDEPENDENT WITH ACTIVITY. SHE HAS A WALKER THAT SHE CAN USE BUT DIDN'T USE CONSISTENTLY. [facility]  PATIENT GOAL: \" GET BETTER, FEEL BETTER'  MEDICAL NECESSITY: PATIENT HAS WEAKNESS IN LE, LIMTIED ACTIVITY TOLERANCE, RALL RISK AND DIFFICULTY WITH AMBULATION AND FULL TIME DEPENDENCE ON AD REQUIRING PT SERVICES."

## 2022-08-03 ENCOUNTER — HOME CARE VISIT (OUTPATIENT)
Dept: HOME HEALTH SERVICES | Facility: CLINIC | Age: 82
End: 2022-08-03

## 2022-08-03 VITALS
OXYGEN SATURATION: 97 % | SYSTOLIC BLOOD PRESSURE: 120 MMHG | RESPIRATION RATE: 18 BRPM | TEMPERATURE: 98.2 F | DIASTOLIC BLOOD PRESSURE: 62 MMHG | HEART RATE: 59 BPM

## 2022-08-03 PROCEDURE — G0299 HHS/HOSPICE OF RN EA 15 MIN: HCPCS

## 2022-08-03 PROCEDURE — G0158 HHC OT ASSISTANT EA 15: HCPCS

## 2022-08-04 VITALS
DIASTOLIC BLOOD PRESSURE: 62 MMHG | OXYGEN SATURATION: 98 % | RESPIRATION RATE: 18 BRPM | TEMPERATURE: 97.5 F | HEART RATE: 58 BPM | SYSTOLIC BLOOD PRESSURE: 120 MMHG

## 2022-08-05 ENCOUNTER — READMISSION MANAGEMENT (OUTPATIENT)
Dept: CALL CENTER | Facility: HOSPITAL | Age: 82
End: 2022-08-05

## 2022-08-05 NOTE — OUTREACH NOTE
COVID-19 Week 2 Survey    Flowsheet Row Responses   [facility] facility patient discharged from? [place]   Does the patient have one of the following disease processes/diagnoses(primary or secondary)? COVID-19   COVID-19 underlying condition? AMI   Call Number Call 1   COVID-19 Week 2: Call 1 attempt successful? No   Discharge diagnosis NSTEMI, Covid-19          SARAH BARNES - Registered Nurse

## 2022-08-08 ENCOUNTER — HOME CARE VISIT (OUTPATIENT)
Dept: HOME HEALTH SERVICES | Facility: CLINIC | Age: 82
End: 2022-08-08

## 2022-08-08 VITALS
RESPIRATION RATE: 18 BRPM | OXYGEN SATURATION: 100 % | TEMPERATURE: 98 F | HEART RATE: 67 BPM | DIASTOLIC BLOOD PRESSURE: 58 MMHG | SYSTOLIC BLOOD PRESSURE: 130 MMHG

## 2022-08-08 PROCEDURE — G0158 HHC OT ASSISTANT EA 15: HCPCS

## 2022-08-09 ENCOUNTER — HOME CARE VISIT (OUTPATIENT)
Dept: HOME HEALTH SERVICES | Facility: CLINIC | Age: 82
End: 2022-08-09

## 2022-08-09 VITALS
HEART RATE: 61 BPM | DIASTOLIC BLOOD PRESSURE: 60 MMHG | OXYGEN SATURATION: 100 % | RESPIRATION RATE: 14 BRPM | SYSTOLIC BLOOD PRESSURE: 118 MMHG | TEMPERATURE: 98.1 F

## 2022-08-09 PROCEDURE — G0493 RN CARE EA 15 MIN HH/HOSPICE: HCPCS

## 2022-08-09 NOTE — CASE COMMUNICATION
Patient is progressing toward goals and is exptected to meet all OT goals by next OT visit.  THANK YOU.

## 2022-08-11 ENCOUNTER — HOME CARE VISIT (OUTPATIENT)
Dept: HOME HEALTH SERVICES | Facility: CLINIC | Age: 82
End: 2022-08-11

## 2022-08-11 PROCEDURE — G0157 HHC PT ASSISTANT EA 15: HCPCS

## 2022-08-12 ENCOUNTER — HOME CARE VISIT (OUTPATIENT)
Dept: HOME HEALTH SERVICES | Facility: CLINIC | Age: 82
End: 2022-08-12

## 2022-08-12 VITALS
OXYGEN SATURATION: 99 % | DIASTOLIC BLOOD PRESSURE: 60 MMHG | RESPIRATION RATE: 18 BRPM | SYSTOLIC BLOOD PRESSURE: 118 MMHG | TEMPERATURE: 97.9 F | HEART RATE: 58 BPM

## 2022-08-12 PROCEDURE — G0158 HHC OT ASSISTANT EA 15: HCPCS

## 2022-08-15 ENCOUNTER — HOME CARE VISIT (OUTPATIENT)
Dept: HOME HEALTH SERVICES | Facility: CLINIC | Age: 82
End: 2022-08-15

## 2022-08-15 PROCEDURE — G0157 HHC PT ASSISTANT EA 15: HCPCS

## 2022-08-15 NOTE — CASE COMMUNICATION
Patient has complet OT POC and met all OT GOALS .  OT discharge summary is complete  and ready for co-sign. THANK YOU .

## 2022-08-16 ENCOUNTER — HOME CARE VISIT (OUTPATIENT)
Dept: HOME HEALTH SERVICES | Facility: CLINIC | Age: 82
End: 2022-08-16

## 2022-08-16 VITALS
SYSTOLIC BLOOD PRESSURE: 122 MMHG | HEART RATE: 59 BPM | OXYGEN SATURATION: 99 % | TEMPERATURE: 97.9 F | DIASTOLIC BLOOD PRESSURE: 65 MMHG | RESPIRATION RATE: 18 BRPM

## 2022-08-16 VITALS
TEMPERATURE: 98.1 F | SYSTOLIC BLOOD PRESSURE: 118 MMHG | DIASTOLIC BLOOD PRESSURE: 76 MMHG | RESPIRATION RATE: 16 BRPM | HEART RATE: 61 BPM | OXYGEN SATURATION: 100 %

## 2022-08-16 PROCEDURE — G0493 RN CARE EA 15 MIN HH/HOSPICE: HCPCS

## 2022-08-16 NOTE — CASE COMMUNICATION
"PT WAS REFERRED TO HOME HEALTH FOLLOWING: PATIENT WAS HOSPITALIZED 7/24/2022- 7/26/2022 SECONDARY TO NSTEMI, COVID 19, HYPONATREMIA, URINARY RETENTION. PATIENT WAS HAVING INCREASED CHEST PAIN WHEN SHE PRESETED TO THE ER. PATIENT WAS THEN SENT TO AN OUTSIDE HOSPITAL [facility] SECONDARY TO HER ELEVATED TROPONIN. SHE WAS FOUDN TO BE COVID POSITIVE BUT ASYMPTOMATIC. SHE HAD BEEN ILL A FEW WEEKS PRIOR WITH SOME STOMACH ISSUES BU T NEVER TESTED AT THAT TIME. PATIENT IS DOING WELL SINCE COMING HOME JUST HAVING A LITTLE BIT OF INCREASED WEAKNESS AND LIMITED BALANCE SINCE HOSPITALIZATION    PRIOR VS CURRENT LEVEL OF FUNCTION:    GAIT: 40'ft with RW and supervision on eval. 200'ft in/out of apt. with RW and mod-I including 2 steps on D/C.    T/F: Sit to stands with SBA on eval. Repeated STS x7 with 27\"sec and independence on D/C     BED MOBILITY: Independent on eval  and on D/C    DISCHARGE CONDITION: GOOD    DISCHARGE REASON: GOALS MET    DISCHARGE TO SELF-CARE WITH FAMILY ASSIST AS NEEDED.      NEXT MD VISIT: Anjali NAJERA 8/17/22"

## 2022-08-17 ENCOUNTER — OFFICE VISIT (OUTPATIENT)
Dept: CARDIOLOGY | Facility: CLINIC | Age: 82
End: 2022-08-17

## 2022-08-17 VITALS
OXYGEN SATURATION: 98 % | HEART RATE: 62 BPM | HEIGHT: 62 IN | SYSTOLIC BLOOD PRESSURE: 128 MMHG | BODY MASS INDEX: 23.26 KG/M2 | WEIGHT: 126.4 LBS | DIASTOLIC BLOOD PRESSURE: 66 MMHG

## 2022-08-17 DIAGNOSIS — I25.2 STATUS POST NON-ST ELEVATION MYOCARDIAL INFARCTION (NSTEMI): Primary | ICD-10-CM

## 2022-08-17 DIAGNOSIS — E87.1 HYPONATREMIA: ICD-10-CM

## 2022-08-17 DIAGNOSIS — I10 ESSENTIAL HYPERTENSION: ICD-10-CM

## 2022-08-17 PROCEDURE — 99214 OFFICE O/P EST MOD 30 MIN: CPT | Performed by: NURSE PRACTITIONER

## 2022-08-17 PROCEDURE — 93000 ELECTROCARDIOGRAM COMPLETE: CPT | Performed by: NURSE PRACTITIONER

## 2022-08-17 RX ORDER — NITROGLYCERIN 0.4 MG/1
TABLET SUBLINGUAL
Qty: 25 TABLET | Refills: 1 | Status: SHIPPED | OUTPATIENT
Start: 2022-08-17

## 2022-08-17 NOTE — PROGRESS NOTES
Subjective:     Encounter Date:08/17/2022      Patient ID: Ashley Maher is a 81 y.o. female.    Chief Complaint: follow up NSTEMI     History of Present Illness     The patient presents to follow up regarding her recent NSTEMI. She was admitted 7/24/2022 and established care with Dr. Davis at that time. She reports the night prior she had a 2-3 hr episode of acute chest pain without radiation or other symptoms. Workup revealed type I NSTEMI with a peak troponin of 2.55 and evidence of some lateral Q waves and non specific T wave abnormalities on EKG. She was found to be positive for COVID-19, though did not appear to be symptomatic from that. She was also noted to be hyponatremic. Given her lack of any recurrent chest pain, hemodynamic stability and positive COVID -19 status, her NSTEMI was treated non invasively. She received anticoagulation with lovenox x 48 hr and was started on GDMT with ASA, plavix, high intensity statin, and BB. ACEI was transitioned to ARB at dc given her cough. Echo revealed an LVEF of 45%. She was discharged home in stable condition on 7/26 with recommendations per Dr. Davis for 1-2 week follow up, at which point further risk stratification with a nuclear stress test would be considered. She was not referred for cardiac rehab given the need for further ischemic evaluation as an outpatient.     The pt presents today for follow up and is accompanied by an adult male who contributes to her history. She has been doing well since discharge without any recurrent chest pain. She reports she initially worked with physical therapy after dc, but has completed that. She is able to walk around her home and in her yard with her dog without any issues- no chest pain or shortness of breath. She uses her walker if she is walking longer distances outside her home -no CP or SOA walking in to the office today. BP is well controlled. Denies palpitations, edema, orthopnea, PND, syncope or presyncope.    Tolerating her new medications without issue     The following portions of the patient's history were reviewed and updated as appropriate: allergies, current medications, past family history, past medical history, past social history, past surgical history and problem list.    Review of Systems   Constitutional: Negative for malaise/fatigue.   Cardiovascular: Negative for chest pain, claudication, dyspnea on exertion, leg swelling, near-syncope, orthopnea, palpitations, paroxysmal nocturnal dyspnea and syncope.   Respiratory: Negative for cough and shortness of breath.    Hematologic/Lymphatic: Does not bruise/bleed easily.   Musculoskeletal: Negative for falls.   Gastrointestinal: Negative for bloating.   Neurological: Positive for weakness. Negative for dizziness and light-headedness.       Allergies   Allergen Reactions   • Sulfa Antibiotics Other (See Comments)     EXTREMELY LOW BLOOD PRESSURE   • Pyridium [Phenazopyridine Hcl] Swelling     RASH WITH SWELLING FACE   • Tramadol Dizziness     STATES SHE COULDN'T THINK   • Trimethoprim Unknown (See Comments)     RECEIVED FROM [facility], PT UNSURE OF REACTION   • Paba Derivatives Rash     INGREDIENT IN SUNSCREENS       Current Outpatient Medications:   •  ascorbic acid (VITAMIN C) 500 MG tablet, Take 1 tablet by mouth Daily for 30 days., Disp: 30 tablet, Rfl: 0  •  aspirin 81 MG chewable tablet, Chew 1 tablet Daily for 90 days., Disp: 90 tablet, Rfl: 0  •  atorvastatin (LIPITOR) 40 MG tablet, Take 1 tablet by mouth Every Night for 90 days., Disp: 90 tablet, Rfl: 0  •  bimatoprost (LUMIGAN) 0.01 % ophthalmic drops, Administer 1 drop to the right eye Every Night., Disp: , Rfl:   •  carvedilol (COREG) 3.125 MG tablet, Take 1 tablet by mouth 2 (Two) Times a Day With Meals for 90 days., Disp: 180 tablet, Rfl: 0  •  Cholecalciferol (VITAMIN D3) 5000 units capsule capsule, Take 5,000 Units by mouth Daily., Disp: , Rfl:   •  clopidogrel (PLAVIX) 75 MG tablet, Take 1  "tablet by mouth Daily for 90 days., Disp: 90 tablet, Rfl: 0  •  losartan (Cozaar) 25 MG tablet, Take 1 tablet by mouth Daily for 30 days., Disp: 30 tablet, Rfl: 0  •  montelukast (SINGULAIR) 10 MG tablet, Take 10 mg by mouth Every Night., Disp: , Rfl:   •  zinc sulfate (ZINCATE) 220 (50 Zn) MG capsule, Take 1 capsule by mouth Daily for 30 days., Disp: 30 capsule, Rfl: 0  •  nitroglycerin (NITROSTAT) 0.4 MG SL tablet, 1 under the tongue as needed for angina, may repeat q5mins for up three doses, Disp: 25 tablet, Rfl: 1         Objective:    /66   Pulse 62   Ht 157.5 cm (62\")   Wt 57.3 kg (126 lb 6.4 oz)   SpO2 98%   BMI 23.12 kg/m²        Vitals and nursing note reviewed.   Constitutional:       General: Not in acute distress.     Appearance: Well-developed and not in distress. Not diaphoretic.   Neck:      Vascular: No JVD.   Pulmonary:      Effort: Pulmonary effort is normal. No respiratory distress.      Breath sounds: Normal breath sounds.   Cardiovascular:      Normal rate. Regular rhythm.      Murmurs: There is no murmur.   Edema:     Peripheral edema absent.   Abdominal:      Tenderness: There is no abdominal tenderness.   Skin:     General: Skin is warm and dry.   Neurological:      Mental Status: Alert and oriented to person, place, and time.         Lab Review:   Lab Results   Component Value Date    GLUCOSE 119 (H) 07/25/2022    BUN 8 07/25/2022    CREATININE 0.60 07/25/2022    EGFRIFNONA 109 07/20/2018    BCR 13.3 07/25/2022    K 3.6 07/25/2022    CO2 23.0 07/25/2022    CALCIUM 8.5 (L) 07/25/2022    ALBUMIN 3.30 (L) 07/25/2022    AST 89 (H) 07/25/2022    ALT 20 07/25/2022     Lab Results   Component Value Date    CHOL 145 07/24/2022    TRIG 52 07/24/2022    HDL 49 07/24/2022    LDL 85 07/24/2022     Lab Results   Component Value Date    HGBA1C 5.60 07/24/2022             ECG 12 Lead    Date/Time: 8/20/2022 9:47 AM  Performed by: Anjali Alcaraz APRN  Authorized by: Anjali Alcaraz APRN "   Comparison: compared with previous ECG from 7/25/2022  Similar to previous ECG  Comparison to previous ECG: T wave inversions I, aVL, Q wave aVL; non specific T wave abnormalities V1, V2, V6  Rhythm: sinus rhythm  BPM: 61              Results for orders placed during the hospital encounter of 07/24/22    Adult Transthoracic Echo Complete W/ Cont if Necessary Per Protocol    Interpretation Summary  · Calculated left ventricular 3D EF = 45% Estimated left ventricular EF = 45% Left ventricular systolic function is mildly decreased.  · The following left ventricular wall segments are hypokinetic: mid anterior, apical anterior, mid anterolateral, apical lateral and mid inferolateral.  · Left ventricular diastolic function is consistent with (grade II w/high LAP) pseudonormalization.  · Estimated right ventricular systolic pressure from tricuspid regurgitation is mildly elevated (35-45 mmHg).  · Normal size and function of the right ventricle.  · No significant (greater than mild) valvular pathology.      Assessment:      Problem List Items Addressed This Visit        Cardiac and Vasculature    Status post non-ST elevation myocardial infarction (NSTEMI) - Primary    Overview     Type I NSTEMI 7/24/2022 - medically managed given positive COVID 19 status, without recurrent chest pain following initial event.          Relevant Orders    Stress Test With Myocardial Perfusion One Day    Essential hypertension       Genitourinary and Reproductive     Hyponatremia          Plan:     1. NSTEMI : Stable. No recurrent CP since NSTEMI occurred 7/23-7/24.   -Check Lexiscan for further risk stratification as recommended by Dr. Davis- further recommendations pending these results; if findings are not high risk and she has does not have lifestyle limiting symptoms with optimal medical therapy will likely not require invasive workup/cath   -Will continue to hold off on cardiac rehab pending Lexiscan results   -Continue ASA, plavix,  high intensity statin, BB, ARB  -PRN SL NTG prescribed and we discussed indications for use    2. Hypertension: well controlled with BB and ARB. Continue these    3. Hyperlipidemia: LDL slightly above goal around 85 prior to high intensity statin initiation. Continue atorvastatin 40, with goal LDL less than 70.    4. Mild LV systolic dysfunction: No s/s consistent with CHF. Continue ARB and BB. Reviewed s/s of CHF to report. Weigh daily. Heart healthy low sodium diet.     Follow up 2-4 weeks after makenna results are back, sooner with symptoms

## 2022-08-20 PROBLEM — I10 ESSENTIAL HYPERTENSION: Status: ACTIVE | Noted: 2022-08-20

## 2022-08-20 PROBLEM — I25.2 STATUS POST NON-ST ELEVATION MYOCARDIAL INFARCTION (NSTEMI): Status: ACTIVE | Noted: 2022-08-20

## 2022-08-23 ENCOUNTER — HOME CARE VISIT (OUTPATIENT)
Dept: HOME HEALTH SERVICES | Facility: CLINIC | Age: 82
End: 2022-08-23

## 2022-08-23 VITALS
OXYGEN SATURATION: 99 % | DIASTOLIC BLOOD PRESSURE: 72 MMHG | TEMPERATURE: 97.3 F | RESPIRATION RATE: 16 BRPM | HEART RATE: 63 BPM | SYSTOLIC BLOOD PRESSURE: 127 MMHG

## 2022-08-23 PROCEDURE — G0299 HHS/HOSPICE OF RN EA 15 MIN: HCPCS

## 2022-08-30 ENCOUNTER — LAB REQUISITION (OUTPATIENT)
Dept: LAB | Facility: HOSPITAL | Age: 82
End: 2022-08-30

## 2022-08-30 ENCOUNTER — HOME CARE VISIT (OUTPATIENT)
Dept: HOME HEALTH SERVICES | Facility: CLINIC | Age: 82
End: 2022-08-30

## 2022-08-30 DIAGNOSIS — R33.9 RETENTION OF URINE, UNSPECIFIED: ICD-10-CM

## 2022-08-30 DIAGNOSIS — I10 ESSENTIAL (PRIMARY) HYPERTENSION: ICD-10-CM

## 2022-08-30 DIAGNOSIS — N39.0 URINARY TRACT INFECTION, SITE NOT SPECIFIED: ICD-10-CM

## 2022-08-30 LAB
BILIRUB UR QL STRIP: NEGATIVE
CLARITY UR: ABNORMAL
COLOR UR: YELLOW
GLUCOSE UR STRIP-MCNC: NEGATIVE MG/DL
HGB UR QL STRIP.AUTO: ABNORMAL
KETONES UR QL STRIP: NEGATIVE
LEUKOCYTE ESTERASE UR QL STRIP.AUTO: ABNORMAL
NITRITE UR QL STRIP: NEGATIVE
PH UR STRIP.AUTO: 6.5 [PH] (ref 5–9)
PROT UR QL STRIP: ABNORMAL
SP GR UR STRIP: 1.01 (ref 1–1.03)
UROBILINOGEN UR QL STRIP: ABNORMAL

## 2022-08-30 PROCEDURE — 87077 CULTURE AEROBIC IDENTIFY: CPT | Performed by: NURSE PRACTITIONER

## 2022-08-30 PROCEDURE — G0299 HHS/HOSPICE OF RN EA 15 MIN: HCPCS

## 2022-08-30 PROCEDURE — 87086 URINE CULTURE/COLONY COUNT: CPT | Performed by: NURSE PRACTITIONER

## 2022-08-30 PROCEDURE — 81003 URINALYSIS AUTO W/O SCOPE: CPT | Performed by: NURSE PRACTITIONER

## 2022-08-30 PROCEDURE — 87186 SC STD MICRODIL/AGAR DIL: CPT | Performed by: NURSE PRACTITIONER

## 2022-08-31 VITALS
HEART RATE: 60 BPM | TEMPERATURE: 97.5 F | RESPIRATION RATE: 16 BRPM | SYSTOLIC BLOOD PRESSURE: 133 MMHG | OXYGEN SATURATION: 99 % | DIASTOLIC BLOOD PRESSURE: 79 MMHG

## 2022-09-01 LAB — BACTERIA SPEC AEROBE CULT: ABNORMAL

## 2022-09-06 ENCOUNTER — HOME CARE VISIT (OUTPATIENT)
Dept: HOME HEALTH SERVICES | Facility: CLINIC | Age: 82
End: 2022-09-06

## 2022-09-06 PROCEDURE — G0299 HHS/HOSPICE OF RN EA 15 MIN: HCPCS

## 2022-09-07 VITALS
TEMPERATURE: 97.6 F | OXYGEN SATURATION: 99 % | HEART RATE: 61 BPM | SYSTOLIC BLOOD PRESSURE: 137 MMHG | DIASTOLIC BLOOD PRESSURE: 80 MMHG | RESPIRATION RATE: 18 BRPM

## 2022-09-12 ENCOUNTER — HOME CARE VISIT (OUTPATIENT)
Dept: HOME HEALTH SERVICES | Facility: CLINIC | Age: 82
End: 2022-09-12

## 2022-09-12 PROCEDURE — G0299 HHS/HOSPICE OF RN EA 15 MIN: HCPCS

## 2022-09-13 VITALS
TEMPERATURE: 97.6 F | SYSTOLIC BLOOD PRESSURE: 126 MMHG | RESPIRATION RATE: 18 BRPM | DIASTOLIC BLOOD PRESSURE: 72 MMHG | OXYGEN SATURATION: 99 % | HEART RATE: 70 BPM

## 2022-09-20 ENCOUNTER — HOSPITAL ENCOUNTER (OUTPATIENT)
Dept: CARDIOLOGY | Facility: HOSPITAL | Age: 82
Discharge: HOME | End: 2022-09-20

## 2022-09-20 VITALS
BODY MASS INDEX: 24.34 KG/M2 | DIASTOLIC BLOOD PRESSURE: 77 MMHG | SYSTOLIC BLOOD PRESSURE: 145 MMHG | WEIGHT: 132.28 LBS | HEIGHT: 62 IN | HEART RATE: 64 BPM

## 2022-09-20 DIAGNOSIS — I25.2 STATUS POST NON-ST ELEVATION MYOCARDIAL INFARCTION (NSTEMI): ICD-10-CM

## 2022-09-20 LAB
BH CV REST NUCLEAR ISOTOPE DOSE: 9.8 MCI
BH CV STRESS BP STAGE 1: NORMAL
BH CV STRESS COMMENTS STAGE 1: NORMAL
BH CV STRESS DOSE REGADENOSON STAGE 1: 0.4
BH CV STRESS DURATION MIN STAGE 1: 0
BH CV STRESS DURATION SEC STAGE 1: 10
BH CV STRESS HR STAGE 1: 79
BH CV STRESS NUCLEAR ISOTOPE DOSE: 34.6 MCI
BH CV STRESS PROTOCOL 1: NORMAL
BH CV STRESS RECOVERY BP: NORMAL MMHG
BH CV STRESS RECOVERY HR: 81 BPM
BH CV STRESS STAGE 1: 1
MAXIMAL PREDICTED HEART RATE: 138 BPM
PERCENT MAX PREDICTED HR: 57.25 %
STRESS BASELINE BP: NORMAL MMHG
STRESS BASELINE HR: 64 BPM
STRESS PERCENT HR: 67 %
STRESS POST EXERCISE DUR SEC: 10 SEC
STRESS POST PEAK BP: NORMAL MMHG
STRESS POST PEAK HR: 79 BPM
STRESS TARGET HR: 117 BPM

## 2022-09-20 PROCEDURE — 0 TECHNETIUM SESTAMIBI: Performed by: NURSE PRACTITIONER

## 2022-09-20 PROCEDURE — 78452 HT MUSCLE IMAGE SPECT MULT: CPT | Performed by: INTERNAL MEDICINE

## 2022-09-20 PROCEDURE — 93018 CV STRESS TEST I&R ONLY: CPT | Performed by: INTERNAL MEDICINE

## 2022-09-20 PROCEDURE — 78452 HT MUSCLE IMAGE SPECT MULT: CPT

## 2022-09-20 PROCEDURE — A9502 TC99M TETROFOSMIN: HCPCS | Performed by: NURSE PRACTITIONER

## 2022-09-20 PROCEDURE — A9500 TC99M SESTAMIBI: HCPCS | Performed by: NURSE PRACTITIONER

## 2022-09-20 PROCEDURE — 25010000002 REGADENOSON 0.4 MG/5ML SOLUTION: Performed by: INTERNAL MEDICINE

## 2022-09-20 PROCEDURE — 0 TECHNETIUM TETROFOSMIN KIT: Performed by: NURSE PRACTITIONER

## 2022-09-20 PROCEDURE — 93017 CV STRESS TEST TRACING ONLY: CPT

## 2022-09-20 RX ADMIN — TECHNETIUM TC 99M SESTAMIBI 1 DOSE: 1 INJECTION INTRAVENOUS at 10:15

## 2022-09-20 RX ADMIN — TETROFOSMIN 1 DOSE: 1.38 INJECTION, POWDER, LYOPHILIZED, FOR SOLUTION INTRAVENOUS at 11:32

## 2022-09-20 RX ADMIN — REGADENOSON 0.4 MG: 0.08 INJECTION, SOLUTION INTRAVENOUS at 11:31

## 2022-09-21 ENCOUNTER — HOME CARE VISIT (OUTPATIENT)
Dept: HOME HEALTH SERVICES | Facility: CLINIC | Age: 82
End: 2022-09-21

## 2022-09-21 VITALS
DIASTOLIC BLOOD PRESSURE: 70 MMHG | HEART RATE: 58 BPM | TEMPERATURE: 98.7 F | RESPIRATION RATE: 16 BRPM | SYSTOLIC BLOOD PRESSURE: 138 MMHG | OXYGEN SATURATION: 97 %

## 2022-09-21 PROCEDURE — G0299 HHS/HOSPICE OF RN EA 15 MIN: HCPCS

## 2022-09-23 ENCOUNTER — PREP FOR SURGERY (OUTPATIENT)
Dept: OTHER | Facility: HOSPITAL | Age: 82
End: 2022-09-23

## 2022-09-23 DIAGNOSIS — I25.2 HISTORY OF NON-ST ELEVATION MYOCARDIAL INFARCTION (NSTEMI): Primary | ICD-10-CM

## 2022-09-23 DIAGNOSIS — R94.39 ABNORMAL STRESS TEST: ICD-10-CM

## 2022-09-23 RX ORDER — SODIUM CHLORIDE 9 MG/ML
75 INJECTION, SOLUTION INTRAVENOUS CONTINUOUS
Status: CANCELLED | OUTPATIENT
Start: 2022-09-23

## 2022-09-26 PROBLEM — R94.39 ABNORMAL STRESS TEST: Status: ACTIVE | Noted: 2022-09-26

## 2022-09-30 ENCOUNTER — HOSPITAL ENCOUNTER (OUTPATIENT)
Facility: HOSPITAL | Age: 82
Discharge: HOME | End: 2022-09-30
Attending: INTERNAL MEDICINE | Admitting: INTERNAL MEDICINE

## 2022-09-30 VITALS
SYSTOLIC BLOOD PRESSURE: 167 MMHG | BODY MASS INDEX: 23.45 KG/M2 | TEMPERATURE: 97.8 F | OXYGEN SATURATION: 99 % | WEIGHT: 127.4 LBS | HEIGHT: 62 IN | RESPIRATION RATE: 16 BRPM | DIASTOLIC BLOOD PRESSURE: 76 MMHG | HEART RATE: 62 BPM

## 2022-09-30 DIAGNOSIS — R94.39 ABNORMAL STRESS TEST: ICD-10-CM

## 2022-09-30 DIAGNOSIS — I25.2 HISTORY OF NON-ST ELEVATION MYOCARDIAL INFARCTION (NSTEMI): ICD-10-CM

## 2022-09-30 PROBLEM — I50.21 ACUTE SYSTOLIC CHF (CONGESTIVE HEART FAILURE): Status: ACTIVE | Noted: 2022-09-30

## 2022-09-30 LAB
ALBUMIN SERPL-MCNC: 4.4 G/DL (ref 3.5–5.2)
ALBUMIN/GLOB SERPL: 1.3 G/DL
ALP SERPL-CCNC: 78 U/L (ref 39–117)
ALT SERPL W P-5'-P-CCNC: 22 U/L (ref 1–33)
ANION GAP SERPL CALCULATED.3IONS-SCNC: 9 MMOL/L (ref 5–15)
AST SERPL-CCNC: 26 U/L (ref 1–32)
BASOPHILS # BLD AUTO: 0.05 10*3/MM3 (ref 0–0.2)
BASOPHILS NFR BLD AUTO: 1.1 % (ref 0–1.5)
BILIRUB SERPL-MCNC: 0.7 MG/DL (ref 0–1.2)
BUN SERPL-MCNC: 15 MG/DL (ref 8–23)
BUN/CREAT SERPL: 24.2 (ref 7–25)
CALCIUM SPEC-SCNC: 8.8 MG/DL (ref 8.6–10.5)
CHLORIDE SERPL-SCNC: 93 MMOL/L (ref 98–107)
CHOLEST SERPL-MCNC: 113 MG/DL (ref 0–200)
CO2 SERPL-SCNC: 27 MMOL/L (ref 22–29)
CREAT SERPL-MCNC: 0.62 MG/DL (ref 0.57–1)
DEPRECATED RDW RBC AUTO: 50.7 FL (ref 37–54)
EGFRCR SERPLBLD CKD-EPI 2021: 89 ML/MIN/1.73
EOSINOPHIL # BLD AUTO: 0.14 10*3/MM3 (ref 0–0.4)
EOSINOPHIL NFR BLD AUTO: 3 % (ref 0.3–6.2)
ERYTHROCYTE [DISTWIDTH] IN BLOOD BY AUTOMATED COUNT: 14 % (ref 12.3–15.4)
GLOBULIN UR ELPH-MCNC: 3.4 GM/DL
GLUCOSE SERPL-MCNC: 101 MG/DL (ref 65–99)
HCT VFR BLD AUTO: 35.3 % (ref 34–46.6)
HDLC SERPL-MCNC: 58 MG/DL (ref 40–60)
HGB BLD-MCNC: 11.7 G/DL (ref 12–15.9)
IMM GRANULOCYTES # BLD AUTO: 0.01 10*3/MM3 (ref 0–0.05)
IMM GRANULOCYTES NFR BLD AUTO: 0.2 % (ref 0–0.5)
INR PPP: 1.18 (ref 0.91–1.09)
LDLC SERPL CALC-MCNC: 45 MG/DL (ref 0–100)
LDLC/HDLC SERPL: 0.82 {RATIO}
LYMPHOCYTES # BLD AUTO: 1.04 10*3/MM3 (ref 0.7–3.1)
LYMPHOCYTES NFR BLD AUTO: 22.4 % (ref 19.6–45.3)
MCH RBC QN AUTO: 32.3 PG (ref 26.6–33)
MCHC RBC AUTO-ENTMCNC: 33.1 G/DL (ref 31.5–35.7)
MCV RBC AUTO: 97.5 FL (ref 79–97)
MONOCYTES # BLD AUTO: 0.45 10*3/MM3 (ref 0.1–0.9)
MONOCYTES NFR BLD AUTO: 9.7 % (ref 5–12)
NEUTROPHILS NFR BLD AUTO: 2.96 10*3/MM3 (ref 1.7–7)
NEUTROPHILS NFR BLD AUTO: 63.6 % (ref 42.7–76)
NRBC BLD AUTO-RTO: 0 /100 WBC (ref 0–0.2)
PLATELET # BLD AUTO: 272 10*3/MM3 (ref 140–450)
PMV BLD AUTO: 10.1 FL (ref 6–12)
POTASSIUM SERPL-SCNC: 3.8 MMOL/L (ref 3.5–5.2)
PROT SERPL-MCNC: 7.8 G/DL (ref 6–8.5)
PROTHROMBIN TIME: 14.5 SECONDS (ref 11.9–14.6)
RBC # BLD AUTO: 3.62 10*6/MM3 (ref 3.77–5.28)
SODIUM SERPL-SCNC: 129 MMOL/L (ref 136–145)
TRIGL SERPL-MCNC: 37 MG/DL (ref 0–150)
VLDLC SERPL-MCNC: 10 MG/DL (ref 5–40)
WBC NRBC COR # BLD: 4.65 10*3/MM3 (ref 3.4–10.8)

## 2022-09-30 PROCEDURE — 93458 L HRT ARTERY/VENTRICLE ANGIO: CPT | Performed by: INTERNAL MEDICINE

## 2022-09-30 PROCEDURE — 80053 COMPREHEN METABOLIC PANEL: CPT | Performed by: NURSE PRACTITIONER

## 2022-09-30 PROCEDURE — 80061 LIPID PANEL: CPT | Performed by: NURSE PRACTITIONER

## 2022-09-30 PROCEDURE — C1894 INTRO/SHEATH, NON-LASER: HCPCS | Performed by: INTERNAL MEDICINE

## 2022-09-30 PROCEDURE — 25010000002 MIDAZOLAM PER 1 MG: Performed by: INTERNAL MEDICINE

## 2022-09-30 PROCEDURE — 0 IOPAMIDOL PER 1 ML: Performed by: INTERNAL MEDICINE

## 2022-09-30 PROCEDURE — C1760 CLOSURE DEV, VASC: HCPCS | Performed by: INTERNAL MEDICINE

## 2022-09-30 PROCEDURE — 85025 COMPLETE CBC W/AUTO DIFF WBC: CPT | Performed by: NURSE PRACTITIONER

## 2022-09-30 PROCEDURE — 25010000002 HEPARIN (PORCINE) 1000-0.9 UT/500ML-% SOLUTION: Performed by: INTERNAL MEDICINE

## 2022-09-30 PROCEDURE — 99152 MOD SED SAME PHYS/QHP 5/>YRS: CPT | Performed by: INTERNAL MEDICINE

## 2022-09-30 PROCEDURE — 25010000002 FENTANYL CITRATE (PF) 50 MCG/ML SOLUTION: Performed by: INTERNAL MEDICINE

## 2022-09-30 PROCEDURE — 63710000001 ASPIRIN 81 MG CHEWABLE TABLET: Performed by: INTERNAL MEDICINE

## 2022-09-30 PROCEDURE — 25010000002 DIPHENHYDRAMINE PER 50 MG: Performed by: INTERNAL MEDICINE

## 2022-09-30 PROCEDURE — 25010000002 HEPARIN (PORCINE) 2000-0.9 UNIT/L-% SOLUTION: Performed by: INTERNAL MEDICINE

## 2022-09-30 PROCEDURE — 85610 PROTHROMBIN TIME: CPT | Performed by: NURSE PRACTITIONER

## 2022-09-30 PROCEDURE — A9270 NON-COVERED ITEM OR SERVICE: HCPCS | Performed by: INTERNAL MEDICINE

## 2022-09-30 RX ORDER — SODIUM CHLORIDE 9 MG/ML
100 INJECTION, SOLUTION INTRAVENOUS CONTINUOUS
Status: CANCELLED | OUTPATIENT
Start: 2022-09-30 | End: 2022-09-30

## 2022-09-30 RX ORDER — ASPIRIN 81 MG/1
TABLET, CHEWABLE ORAL AS NEEDED
Status: DISCONTINUED | OUTPATIENT
Start: 2022-09-30 | End: 2022-09-30 | Stop reason: HOSPADM

## 2022-09-30 RX ORDER — HEPARIN SODIUM 200 [USP'U]/100ML
INJECTION, SOLUTION INTRAVENOUS AS NEEDED
Status: DISCONTINUED | OUTPATIENT
Start: 2022-09-30 | End: 2022-09-30 | Stop reason: HOSPADM

## 2022-09-30 RX ORDER — LIDOCAINE HYDROCHLORIDE 20 MG/ML
INJECTION, SOLUTION INFILTRATION; PERINEURAL AS NEEDED
Status: DISCONTINUED | OUTPATIENT
Start: 2022-09-30 | End: 2022-09-30 | Stop reason: HOSPADM

## 2022-09-30 RX ORDER — DAPAGLIFLOZIN 10 MG/1
10 TABLET, FILM COATED ORAL DAILY
Qty: 30 TABLET | Refills: 11 | Status: SHIPPED | OUTPATIENT
Start: 2022-09-30

## 2022-09-30 RX ORDER — SODIUM CHLORIDE 9 MG/ML
75 INJECTION, SOLUTION INTRAVENOUS CONTINUOUS
Status: DISCONTINUED | OUTPATIENT
Start: 2022-09-30 | End: 2022-09-30 | Stop reason: HOSPADM

## 2022-09-30 RX ORDER — MIDAZOLAM HYDROCHLORIDE 1 MG/ML
INJECTION INTRAMUSCULAR; INTRAVENOUS AS NEEDED
Status: DISCONTINUED | OUTPATIENT
Start: 2022-09-30 | End: 2022-09-30 | Stop reason: HOSPADM

## 2022-09-30 RX ORDER — FENTANYL CITRATE 50 UG/ML
INJECTION, SOLUTION INTRAMUSCULAR; INTRAVENOUS AS NEEDED
Status: DISCONTINUED | OUTPATIENT
Start: 2022-09-30 | End: 2022-09-30 | Stop reason: HOSPADM

## 2022-09-30 RX ORDER — ACETAMINOPHEN 325 MG/1
650 TABLET ORAL EVERY 4 HOURS PRN
Status: CANCELLED | OUTPATIENT
Start: 2022-09-30

## 2022-09-30 RX ORDER — DIPHENHYDRAMINE HYDROCHLORIDE 50 MG/ML
INJECTION INTRAMUSCULAR; INTRAVENOUS AS NEEDED
Status: DISCONTINUED | OUTPATIENT
Start: 2022-09-30 | End: 2022-09-30 | Stop reason: HOSPADM

## 2022-09-30 RX ADMIN — SODIUM CHLORIDE 75 ML/HR: 9 INJECTION, SOLUTION INTRAVENOUS at 07:52

## 2023-01-17 ENCOUNTER — LAB (OUTPATIENT)
Dept: LAB | Facility: HOSPITAL | Age: 83
End: 2023-01-17
Payer: MEDICARE

## 2023-01-17 ENCOUNTER — OFFICE VISIT (OUTPATIENT)
Dept: CARDIOLOGY | Facility: CLINIC | Age: 83
End: 2023-01-17
Payer: MEDICARE

## 2023-01-17 VITALS
SYSTOLIC BLOOD PRESSURE: 146 MMHG | WEIGHT: 128.4 LBS | DIASTOLIC BLOOD PRESSURE: 72 MMHG | HEIGHT: 62 IN | BODY MASS INDEX: 23.63 KG/M2 | OXYGEN SATURATION: 99 % | HEART RATE: 67 BPM

## 2023-01-17 DIAGNOSIS — I50.22 CHRONIC SYSTOLIC CHF (CONGESTIVE HEART FAILURE): Primary | ICD-10-CM

## 2023-01-17 DIAGNOSIS — I25.2 STATUS POST NON-ST ELEVATION MYOCARDIAL INFARCTION (NSTEMI): ICD-10-CM

## 2023-01-17 DIAGNOSIS — E87.1 HYPONATREMIA: ICD-10-CM

## 2023-01-17 DIAGNOSIS — I10 ESSENTIAL HYPERTENSION: ICD-10-CM

## 2023-01-17 LAB
ALBUMIN SERPL-MCNC: 4 G/DL (ref 3.5–5.2)
ALBUMIN/GLOB SERPL: 1.3 G/DL
ALP SERPL-CCNC: 74 U/L (ref 39–117)
ALT SERPL W P-5'-P-CCNC: 18 U/L (ref 1–33)
ANION GAP SERPL CALCULATED.3IONS-SCNC: 9 MMOL/L (ref 5–15)
AST SERPL-CCNC: 25 U/L (ref 1–32)
BILIRUB SERPL-MCNC: 0.6 MG/DL (ref 0–1.2)
BUN SERPL-MCNC: 13 MG/DL (ref 8–23)
BUN/CREAT SERPL: 20.3 (ref 7–25)
CALCIUM SPEC-SCNC: 8.8 MG/DL (ref 8.6–10.5)
CHLORIDE SERPL-SCNC: 93 MMOL/L (ref 98–107)
CO2 SERPL-SCNC: 26 MMOL/L (ref 22–29)
CREAT SERPL-MCNC: 0.64 MG/DL (ref 0.57–1)
EGFRCR SERPLBLD CKD-EPI 2021: 87.8 ML/MIN/1.73
GLOBULIN UR ELPH-MCNC: 3.1 GM/DL
GLUCOSE SERPL-MCNC: 99 MG/DL (ref 65–99)
NT-PROBNP SERPL-MCNC: 1066 PG/ML (ref 0–1800)
POTASSIUM SERPL-SCNC: 4.1 MMOL/L (ref 3.5–5.2)
PROT SERPL-MCNC: 7.1 G/DL (ref 6–8.5)
SODIUM SERPL-SCNC: 128 MMOL/L (ref 136–145)

## 2023-01-17 PROCEDURE — 99214 OFFICE O/P EST MOD 30 MIN: CPT | Performed by: NURSE PRACTITIONER

## 2023-01-17 PROCEDURE — 83880 ASSAY OF NATRIURETIC PEPTIDE: CPT | Performed by: NURSE PRACTITIONER

## 2023-01-17 PROCEDURE — 36415 COLL VENOUS BLD VENIPUNCTURE: CPT | Performed by: NURSE PRACTITIONER

## 2023-01-17 PROCEDURE — 80053 COMPREHEN METABOLIC PANEL: CPT | Performed by: NURSE PRACTITIONER

## 2023-01-17 PROCEDURE — 93000 ELECTROCARDIOGRAM COMPLETE: CPT | Performed by: NURSE PRACTITIONER

## 2023-01-17 RX ORDER — ATORVASTATIN CALCIUM 40 MG/1
40 TABLET, FILM COATED ORAL DAILY
COMMUNITY

## 2023-01-17 RX ORDER — ASPIRIN 81 MG/1
81 TABLET ORAL DAILY
COMMUNITY

## 2023-01-17 RX ORDER — CLOPIDOGREL BISULFATE 75 MG/1
75 TABLET ORAL DAILY
COMMUNITY

## 2023-01-17 NOTE — PROGRESS NOTES
Subjective:     Encounter Date: 1/17/2023      Patient ID: Ashley Maher is a 82 y.o. female.    Chief Complaint: follow up NSTEMI, CHF     History of Present Illness     The patient presents to follow up regarding her NSTEMI and systolic CHF. She was admitted 7/24/2022 and established care with Dr. Davis at that time. She reports the night prior she had a 2-3 hr episode of acute chest pain without radiation or other symptoms. Workup revealed type I NSTEMI with a peak troponin of 2.55 and evidence of some lateral Q waves and non specific T wave abnormalities on EKG. She was found to be positive for COVID-19, though did not appear to be symptomatic from that. She was also noted to be hyponatremic. Given her lack of any recurrent chest pain, hemodynamic stability and positive COVID -19 status, her NSTEMI was treated non invasively. She received anticoagulation with lovenox x 48 hr and was started on GDMT with ASA, plavix, high intensity statin, and BB. ACEI was transitioned to ARB at [place] given her cough. Echo revealed an LVEF of 45%. She was discharged home in stable condition on 7/26 with recommendations per Dr. Davis for 1-2 week follow up, at which point further risk stratification with a nuclear stress test would be considered. She was not referred for cardiac rehab given the need for further ischemic evaluation as an outpatient.     I followed up with the patient in August 2022 and she was doing well.  She had completed physical therapy at home.  She was able to walk around her home in her yard with her dog without any chest discomfort or shortness of breath.  She used a walker for walking longer distances.  She was without complaint aside from some generalized weakness.  She was tolerating her medications.  At that visit, for further risk stratification I did order the Lexiscan.  This was high risk and that Dr. Davis subsequently proceeded with cardiac catheterization on 9/30/2022.  See full details in cath  report-she was found to have near normal coronary arteries but did have an LVEF of 40% and evidence of elevated LVEDP and volume overload on exam that day.  Therefore, Dr. Davis added an SGLT2 inhibitor to her medical therapy and instructed her to continue all other medical therapy.  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    The patient presents today for follow-up and is unable to tell me what medication she is taking at home.  She reports that she is feeling well overall.  She denies any chest pain, shortness of breath, edema, orthopnea, PND, palpitations, syncope or presyncope.  She continues to report generalized weakness.  She is not monitoring her blood pressure.  She is not weighing daily.  She states her daughter organizes her pills for her at home.         The following portions of the patient's history were reviewed and updated as appropriate: allergies, current medications, past family history, past medical history, past social history, past surgical history and problem list.    Review of Systems   Constitutional: Negative for malaise/fatigue.   Cardiovascular: Negative for chest pain, claudication, dyspnea on exertion, leg swelling, near-syncope, orthopnea, palpitations, paroxysmal nocturnal dyspnea and syncope.   Respiratory: Negative for cough and shortness of breath.    Hematologic/Lymphatic: Does not bruise/bleed easily.   Musculoskeletal: Negative for falls.   Gastrointestinal: Negative for bloating.   Neurological: Positive for weakness. Negative for dizziness and light-headedness.       Allergies   Allergen Reactions   • Sulfa Antibiotics Other (See Comments)     EXTREMELY LOW BLOOD PRESSURE   • Pyridium [Phenazopyridine Hcl] Swelling     RASH WITH SWELLING FACE   • Tramadol Dizziness     STATES SHE COULDN'T THINK   • Trimethoprim Unknown (See Comments)     RECEIVED FROM [facility], PT UNSURE  "OF REACTION   • Paba Derivatives Rash     INGREDIENT IN SUNSCREENS       Current Outpatient Medications:   •  aspirin 81 MG EC tablet, Take 81 mg by mouth Daily., Disp: , Rfl:   •  atorvastatin (LIPITOR) 40 MG tablet, Take 40 mg by mouth Daily., Disp: , Rfl:   •  bimatoprost (LUMIGAN) 0.01 % ophthalmic drops, Administer 1 drop to the right eye Every Night., Disp: , Rfl:   •  Cholecalciferol (VITAMIN D3) 5000 units capsule capsule, Take 5,000 Units by mouth Daily., Disp: , Rfl:   •  clopidogrel (PLAVIX) 75 MG tablet, Take 75 mg by mouth Daily., Disp: , Rfl:   •  dapagliflozin Propanediol (Farxiga) 10 MG tablet, Take 10 mg by mouth Daily., Disp: 30 tablet, Rfl: 11  •  montelukast (SINGULAIR) 10 MG tablet, Take 10 mg by mouth Every Night., Disp: , Rfl:   •  nitroglycerin (NITROSTAT) 0.4 MG SL tablet, 1 under the tongue as needed for angina, may repeat q5mins for up three doses, Disp: 25 tablet, Rfl: 1  •  carvedilol (COREG) 3.125 MG tablet, Take 1 tablet by mouth 2 (Two) Times a Day With Meals for 90 days., Disp: 180 tablet, Rfl: 0  •  losartan (Cozaar) 25 MG tablet, Take 1 tablet by mouth Daily for 30 days., Disp: 30 tablet, Rfl: 0         Objective:    /72   Pulse 67   Ht 157.5 cm (62\")   Wt 58.2 kg (128 lb 6.4 oz)   SpO2 99%   BMI 23.48 kg/m²        Vitals and nursing note reviewed.   Constitutional:       General: Not in acute distress.     Appearance: Well-developed and not in distress. Not diaphoretic.   Neck:      Vascular: No JVD.   Pulmonary:      Effort: Pulmonary effort is normal. No respiratory distress.      Breath sounds: Decreased breath sounds (bases) present.   Cardiovascular:      Normal rate. Regular rhythm.      Murmurs: There is no murmur.   Edema:     Peripheral edema absent.   Abdominal:      Tenderness: There is no abdominal tenderness.   Skin:     General: Skin is warm and dry.   Neurological:      Mental Status: Alert and oriented to person, place, and time.         Lab Review: "   Lab Results   Component Value Date    GLUCOSE 99 01/17/2023    BUN 13 01/17/2023    CREATININE 0.64 01/17/2023    EGFRIFNONA 109 07/20/2018    BCR 20.3 01/17/2023    K 4.1 01/17/2023    CO2 26.0 01/17/2023    CALCIUM 8.8 01/17/2023    ALBUMIN 4.0 01/17/2023    AST 25 01/17/2023    ALT 18 01/17/2023     Lab Results   Component Value Date    CHOL 113 09/30/2022    TRIG 37 09/30/2022    HDL 58 09/30/2022    LDL 45 09/30/2022     Lab Results   Component Value Date    HGBA1C 5.60 07/24/2022             ECG 12 Lead    Date/Time: 1/17/2023 5:45 PM  Performed by: Anjali Alcaraz APRN  Authorized by: Anjali Alcaraz APRN   Comparison: compared with previous ECG from 8/17/2022  Similar to previous ECG  Rhythm: sinus bradycardia  Ectopy: atrial premature contractions  BPM: 56  Conduction: non-specific intraventricular conduction delay            Results for orders placed during the hospital encounter of 07/24/22    Adult Transthoracic Echo Complete W/ Cont if Necessary Per Protocol    Interpretation Summary  · Calculated left ventricular 3D EF = 45% Estimated left ventricular EF = 45% Left ventricular systolic function is mildly decreased.  · The following left ventricular wall segments are hypokinetic: mid anterior, apical anterior, mid anterolateral, apical lateral and mid inferolateral.  · Left ventricular diastolic function is consistent with (grade II w/high LAP) pseudonormalization.  · Estimated right ventricular systolic pressure from tricuspid regurgitation is mildly elevated (35-45 mmHg).  · Normal size and function of the right ventricle.  · No significant (greater than mild) valvular pathology.    9/30/2022 cath report:    Impression:  1.  Near-normal coronary arteries  2.  LVEF 40% with lateral wall akinesis, not explained by any obstructive coronary disease.  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  Elevated LVEDP     Plan:   1.  2 hours bedrest, then can be discharged home  2.  Continue aspirin 81mg daily indefinitely, and all other current medical therapies including statin, beta-blocker, ARB  3.  In light of reduced EF with elevated LVEDP, as well as bibasilar rales on exam before the procedure, will add SGLT2 inhibitor for better CHF treatment  4.  Follow-up with Anjali Alcaraz in 3 months        Michael Davis MD      Assessment:      Problem List Items Addressed This Visit        Cardiac and Vasculature    Status post non-ST elevation myocardial infarction (NSTEMI)    Overview     Type I NSTEMI 7/24/2022 - medically managed given positive COVID 19 status, without recurrent chest pain following initial event.     Given the non-STEMI, Lexiscan was ordered for further risk stratification and this was high risk and she subsequently underwent cardiac catheterization on 9/30 and this revealed near normal coronary arteries.  It is felt the regional wall motion abnormalities were potentially due to COVID myocarditis at the time of her non-STEMI.         Essential hypertension    Chronic systolic CHF (congestive heart failure) (HCC) - Primary    Overview     LVEF 40-45% with elevated LVEDP on [facility] 9/30/22         Relevant Medications    clopidogrel (PLAVIX) 75 MG tablet    Other Relevant Orders    Comprehensive Metabolic Panel (Completed)    proBNP (Completed)       Genitourinary and Reproductive     Hyponatremia       Plan:     1. NSTEMI : Stable. No recurrent CP since NSTEMI occurred 7/23-7/24.  She was subsequently found to have near normal coronary arteries per cardiac catheterization 9/30/2022 after Lexiscan for risk stratification was high risk.  It is suspected the etiology for lateral akinesis may be COVID myocarditis (had COVID at time of NSTEMI)    -Accurate medication reconciliation currently not available, but we will obtain this and plan to continue her previously prescribed  aspirin, Plavix (only for 1 year from non-STEMI), high intensity statin, beta-blocker, as needed sublingual nitroglycerin    2. Hypertension: Slightly elevated today.  Further recommendations pending accurate medication reconciliation.  For now continue Coreg and losartan.  Pending lab results, may add Aldactone.    3. Hyperlipidemia: LDL well controlled at 45 continue atorvastatin 40, with goal LDL less than 70.    4.  Chronic systolic CHF: She appears compensated/euvolemic on exam today and is denying dyspnea, rapid weight gain, orthopnea, PND.  Continue Coreg, losartan and Farxiga.  Check CMP and BNP today.  Pending results will consider adding Aldactone as well with follow-up labs thereafter.  We reviewed signs and symptoms consistent with acute CHF and what to report.  Weigh daily.  Heart healthy low-sodium diet.    Follow-up again in 3 months, call sooner with symptoms or concerns    I spent 39 minutes caring for Ashley on this date of service. This time includes time spent by me in the following activities: preparing for the visit, reviewing tests, obtaining and/or reviewing a separately obtained history, performing a medically appropriate examination and/or evaluation, counseling and educating the patient/family/caregiver, ordering medications, tests, or procedures and documenting information in the medical record

## 2023-01-18 RX ORDER — LOSARTAN POTASSIUM 25 MG/1
25 TABLET ORAL DAILY
Qty: 90 TABLET | Refills: 3 | Status: SHIPPED | OUTPATIENT
Start: 2023-01-18

## 2023-01-18 RX ORDER — LOSARTAN POTASSIUM 25 MG/1
25 TABLET ORAL DAILY
Qty: 30 TABLET | Refills: 11 | Status: SHIPPED | OUTPATIENT
Start: 2023-01-18 | End: 2023-01-18 | Stop reason: SDUPTHER

## 2023-02-16 NOTE — PROGRESS NOTES
Subjective    Ms. Maher is 82 y.o. female    Chief Complaint: New patient Frequent UTI    History of Present Illness  Patient is a 82-year-old female who was referred to us for evaluation of recurrent urinary tract infections.  She has history of incomplete bladder emptying and performs self-catheterization 4 times daily.  She has seen urology in [place].  She has had several recent urinary tract infections in the past few months been treated with antibiotics.  I was able to locate 1 urine culture November 2022 that grew Streptococcus agalactiae this is normally a contaminant.  However she is symptomatic she gets burning with urination urgency and she has incontinence most likely overflow incontinence.  Patient had 221 mL in her bladder we did get a catheterized urine urinalysis was negative we will send for culture for completeness.  She has had no recent upper tract imaging.    The following portions of the patient's history were reviewed and updated as appropriate: allergies, current medications, past family history, past medical history, past social history, past surgical history and problem list.    Review of Systems   Constitutional: Negative for appetite change, diaphoresis and fever.   HENT: Negative for facial swelling and sore throat.    Eyes: Negative for discharge and visual disturbance.   Respiratory: Negative for cough and shortness of breath.    Cardiovascular: Negative for chest pain and leg swelling.   Gastrointestinal: Negative for anal bleeding and vomiting.   Endocrine: Negative for cold intolerance and heat intolerance.   Genitourinary: Positive for difficulty urinating (incomplete emptying) and urgency. Negative for dysuria, flank pain, frequency, hematuria and pelvic pain.   Musculoskeletal: Negative for back pain and gait problem.   Skin: Negative for pallor and rash.   Allergic/Immunologic: Negative for immunocompromised state.   Neurological: Negative for seizures and headaches.    Hematological: Negative for adenopathy. Does not bruise/bleed easily.   Psychiatric/Behavioral: Negative for dysphoric mood, self-injury and suicidal ideas.         Current Outpatient Medications:   •  amoxicillin (AMOXIL) 500 MG capsule, , Disp: , Rfl:   •  aspirin 81 MG EC tablet, Take 81 mg by mouth Daily., Disp: , Rfl:   •  atorvastatin (LIPITOR) 40 MG tablet, Take 40 mg by mouth Daily., Disp: , Rfl:   •  bimatoprost (LUMIGAN) 0.01 % ophthalmic drops, Administer 1 drop to the right eye Every Night., Disp: , Rfl:   •  carvedilol (COREG) 3.125 MG tablet, Take 1 tablet by mouth 2 (Two) Times a Day With Meals for 90 days., Disp: 180 tablet, Rfl: 0  •  Cholecalciferol (VITAMIN D3) 5000 units capsule capsule, Take 5,000 Units by mouth Daily., Disp: , Rfl:   •  clopidogrel (PLAVIX) 75 MG tablet, Take 75 mg by mouth Daily., Disp: , Rfl:   •  dapagliflozin Propanediol (Farxiga) 10 MG tablet, Take 10 mg by mouth Daily., Disp: 30 tablet, Rfl: 11  •  losartan (COZAAR) 25 MG tablet, Take 1 tablet by mouth Daily., Disp: 90 tablet, Rfl: 3  •  montelukast (SINGULAIR) 10 MG tablet, Take 10 mg by mouth Every Night., Disp: , Rfl:   •  nitroglycerin (NITROSTAT) 0.4 MG SL tablet, 1 under the tongue as needed for angina, may repeat q5mins for up three doses, Disp: 25 tablet, Rfl: 1  •  bethanechol (URECHOLINE) 10 MG tablet, Take 1 tablet by mouth 3 (Three) Times a Day for 90 days., Disp: 90 tablet, Rfl: 2    Past Medical History:   Diagnosis Date   • Arthritis    • Cataract     BILAT   • Electrolyte imbalance    • Hypertension    • Vitamin D deficiency        Past Surgical History:   Procedure Laterality Date   • ANTERIOR AND POSTERIOR VAGINAL REPAIR     • APPENDECTOMY     • CARDIAC CATHETERIZATION N/A 9/30/2022    Procedure: Left Heart Cath;  Surgeon: Michael Davis MD;  Location:  [facility];  Service: Cardiology;  Laterality: N/A;   • COLONOSCOPY     • EXCISION LESION      RIGHT SHOULDER ABCESSES   • EYE  "SURGERY     • HYSTERECTOMY     • TOTAL HIP ARTHROPLASTY Right 2018    Procedure: RIGHT TOTAL HIP REPLACEMENT;  Surgeon: Waldemar Serrano MD;  Location: [facility] OR;  Service: Orthopedics       Social History     Socioeconomic History   • Marital status:    Tobacco Use   • Smoking status: Former     Packs/day: 1.00     Years: 15.00     Pack years: 15.00     Types: Cigarettes     Quit date:      Years since quittin.1   • Smokeless tobacco: Never   Vaping Use   • Vaping Use: Never used   Substance and Sexual Activity   • Alcohol use: No   • Drug use: No   • Sexual activity: Defer       Family History   Problem Relation Age of Onset   • Diabetes Father    • Heart disease Father    • Aneurysm Father    • Cancer Sister    • Cancer Brother    • Heart disease Brother    • Heart disease Brother    • Diabetes Brother    • Cancer Brother    • Cancer Sister    • Alzheimer's disease Sister        Objective    Temp 96.7 °F (35.9 °C)   Ht 157.5 cm (62\")   Wt 55.5 kg (122 lb 6.4 oz)   BMI 22.39 kg/m²     Physical Exam  Vitals reviewed.   Constitutional:       Appearance: Normal appearance.   HENT:      Head: Normocephalic and atraumatic.      Ears:      Comments: Hard of hearing  Pulmonary:      Effort: Pulmonary effort is normal.   Skin:     Coloration: Skin is not pale.   Neurological:      Mental Status: She is alert and oriented to person, place, and time.   Psychiatric:         Mood and Affect: Mood normal.         Behavior: Behavior normal.             Results for orders placed or performed in visit on 23   POC Urinalysis Dipstick, Multipro    Specimen: Urine   Result Value Ref Range    Color Yellow Yellow, Straw, Dark Yellow, Adina    Clarity, UA Clear Clear    Glucose, UA >=1000 mg/dL (3+) (A) Negative mg/dL    Bilirubin Negative Negative    Ketones, UA Negative Negative    Specific Gravity  1.010 1.005 - 1.030    Blood, UA Negative Negative    pH, Urine 7.0 5.0 - 8.0    Protein, POC Negative " Negative mg/dL    Urobilinogen, UA 0.2 E.U./dL Normal, 0.2 E.U./dL    Nitrite, UA Negative Negative    Leukocytes Negative Negative     Bladder Scan interpretation  Estimation of residual urine via abdominal ultrasound  Residual Urine: 221ml  Indication: Incomplete emptying  Position: Supine  Examination: Incremental scanning of the suprapubic area using 3 MHz transducer using copious amounts of acoustic gel.   Findings: An anechoic area was demonstrated which represented the bladder, with measurement of residual urine as noted. I inspected this myself. In that the residual urine was stable or insignificant, no treatment will be necessary at this time.     Assessment and Plan    Diagnoses and all orders for this visit:    1. Recurrent UTI (Primary)  -     Cancel: POC Urinalysis Dipstick, Multipro  -     CT Abdomen Pelvis With & Without Contrast; Future  -     POC Urinalysis Dipstick, Multipro  -     Urine Culture - Urine, Urine, Catheter In/Out; Future  -     Urine Culture - Urine, Urine, Catheter In/Out    2. Urinary incontinence, unspecified type  -     POC Urinalysis Dipstick, Multipro  -     Urine Culture - Urine, Urine, Catheter In/Out; Future  -     Urine Culture - Urine, Urine, Catheter In/Out    3. Incomplete bladder emptying  -     bethanechol (URECHOLINE) 10 MG tablet; Take 1 tablet by mouth 3 (Three) Times a Day for 90 days.  Dispense: 90 tablet; Refill: 2  -     POC Urinalysis Dipstick, Multipro  -     Urine Culture - Urine, Urine, Catheter In/Out; Future  -     Urine Culture - Urine, Urine, Catheter In/Out    Patient who performs intermittent catheterization frequently he does have some This could be overflow since she is doing well on the bladder we performed catheterized urine which will be sent for culture however it looks negative urinalysis.  She is currently on an antibiotic she was treated with Augmentin for upper respiratory infection as well as UTI.  Like her to return with a CT urogram to  better evaluate if there is any obvious source of infection however she does have risk factors such as her age and likelihood of atrophic vaginitis.  Also with incomplete emptying and catheterization she is at higher risk for developing recurrent urinary tract infections.    She will follow-up in approximately 2 weeks with CT urogram.

## 2023-02-24 ENCOUNTER — OFFICE VISIT (OUTPATIENT)
Dept: UROLOGY | Facility: CLINIC | Age: 83
End: 2023-02-24
Payer: MEDICARE

## 2023-02-24 VITALS — WEIGHT: 122.4 LBS | BODY MASS INDEX: 22.52 KG/M2 | TEMPERATURE: 96.7 F | HEIGHT: 62 IN

## 2023-02-24 DIAGNOSIS — R32 URINARY INCONTINENCE, UNSPECIFIED TYPE: ICD-10-CM

## 2023-02-24 DIAGNOSIS — N39.0 RECURRENT UTI: Primary | ICD-10-CM

## 2023-02-24 DIAGNOSIS — R33.9 INCOMPLETE BLADDER EMPTYING: ICD-10-CM

## 2023-02-24 LAB
BILIRUB BLD-MCNC: NEGATIVE MG/DL
CLARITY, POC: CLEAR
COLOR UR: YELLOW
GLUCOSE UR STRIP-MCNC: ABNORMAL MG/DL
KETONES UR QL: NEGATIVE
LEUKOCYTE EST, POC: NEGATIVE
NITRITE UR-MCNC: NEGATIVE MG/ML
PH UR: 7 [PH] (ref 5–8)
PROT UR STRIP-MCNC: NEGATIVE MG/DL
RBC # UR STRIP: NEGATIVE /UL
SP GR UR: 1.01 (ref 1–1.03)
UROBILINOGEN UR QL: ABNORMAL

## 2023-02-24 PROCEDURE — 87086 URINE CULTURE/COLONY COUNT: CPT | Performed by: PHYSICIAN ASSISTANT

## 2023-02-24 PROCEDURE — 81001 URINALYSIS AUTO W/SCOPE: CPT | Performed by: PHYSICIAN ASSISTANT

## 2023-02-24 PROCEDURE — 99203 OFFICE O/P NEW LOW 30 MIN: CPT | Performed by: PHYSICIAN ASSISTANT

## 2023-02-24 PROCEDURE — 51798 US URINE CAPACITY MEASURE: CPT | Performed by: PHYSICIAN ASSISTANT

## 2023-02-24 RX ORDER — BETHANECHOL CHLORIDE 10 MG/1
10 TABLET ORAL 3 TIMES DAILY
Qty: 90 TABLET | Refills: 2 | Status: SHIPPED | OUTPATIENT
Start: 2023-02-24 | End: 2023-05-25

## 2023-02-24 RX ORDER — AMOXICILLIN 500 MG/1
CAPSULE ORAL
COMMUNITY
Start: 2023-02-22

## 2023-02-25 LAB — BACTERIA SPEC AEROBE CULT: NO GROWTH

## 2023-02-27 ENCOUNTER — TELEPHONE (OUTPATIENT)
Dept: UROLOGY | Facility: CLINIC | Age: 83
End: 2023-02-27
Payer: COMMERCIAL

## 2023-02-27 NOTE — TELEPHONE ENCOUNTER
----- Message from ALISON Sorto sent at 2/27/2023 12:43 PM CST -----  Regarding: Urine culture  Her urine culture grew no bacteria no indication for short-term antibiotic  ----- Message -----  From: Inez Egan MA  Sent: 2/24/2023   9:36 AM CST  To: ALISON Sorto

## 2023-03-15 NOTE — PROGRESS NOTES
Subjective    Ms. Maher is 82 y.o. female    Chief Complaint: Recurrent UTI    History of Present Illness  Patient is an 82-year-old female who was referred to us for evaluation of recurrent urinary tract infections.  She has a history of incomplete bladder emptying and performs self catheterizations 4 times daily.  She has had several infections in the past few months.  She was recently treated with antibiotic when I saw her 02/24/2023 I sent a urine culture that was no growth.  However given the fact she performs self catheterizations most likely she has chronic bacterial colonization.  I started her on bethanechol to see if there would be any improvement in her emptying.  She returns today after getting CT urogram.  Findings will be discussed below.  She denies any flank pain or gross hematuria.    The following portions of the patient's history were reviewed and updated as appropriate: allergies, current medications, past family history, past medical history, past social history, past surgical history and problem list.    Review of Systems   Constitutional: Negative.    HENT: Negative.    Eyes: Negative.    Respiratory: Negative.    Cardiovascular: Negative.    Gastrointestinal: Negative.    Endocrine: Negative.    Genitourinary: Negative.    Musculoskeletal: Negative.    Skin: Negative.    Allergic/Immunologic: Negative.    Neurological: Negative.    Hematological: Negative.    Psychiatric/Behavioral: Negative.          Current Outpatient Medications:   •  amoxicillin (AMOXIL) 500 MG capsule, , Disp: , Rfl:   •  aspirin 81 MG EC tablet, Take 1 tablet by mouth Daily., Disp: , Rfl:   •  atorvastatin (LIPITOR) 40 MG tablet, Take 1 tablet by mouth Daily., Disp: , Rfl:   •  bethanechol (URECHOLINE) 10 MG tablet, Take 1 tablet by mouth 3 (Three) Times a Day for 90 days., Disp: 90 tablet, Rfl: 2  •  bimatoprost (LUMIGAN) 0.01 % ophthalmic drops, Administer 1 drop to the right eye Every Night., Disp: , Rfl:   •   carvedilol (COREG) 3.125 MG tablet, Take 1 tablet by mouth 2 (Two) Times a Day With Meals for 90 days., Disp: 180 tablet, Rfl: 0  •  Cholecalciferol (VITAMIN D3) 5000 units capsule capsule, Take 1 capsule by mouth Daily., Disp: , Rfl:   •  clopidogrel (PLAVIX) 75 MG tablet, Take 1 tablet by mouth Daily., Disp: , Rfl:   •  dapagliflozin Propanediol (Farxiga) 10 MG tablet, Take 10 mg by mouth Daily., Disp: 30 tablet, Rfl: 11  •  losartan (COZAAR) 25 MG tablet, Take 1 tablet by mouth Daily., Disp: 90 tablet, Rfl: 3  •  montelukast (SINGULAIR) 10 MG tablet, Take 1 tablet by mouth Every Night., Disp: , Rfl:   •  nitroglycerin (NITROSTAT) 0.4 MG SL tablet, 1 under the tongue as needed for angina, may repeat q5mins for up three doses, Disp: 25 tablet, Rfl: 1  •  nitrofurantoin (MACRODANTIN) 50 MG capsule, Take 1 capsule by mouth Every Night., Disp: 90 capsule, Rfl: 0  No current facility-administered medications for this visit.    Past Medical History:   Diagnosis Date   • Arthritis    • Cataract     BILAT   • Electrolyte imbalance    • Hypertension    • Vitamin D deficiency        Past Surgical History:   Procedure Laterality Date   • ANTERIOR AND POSTERIOR VAGINAL REPAIR     • APPENDECTOMY     • CARDIAC CATHETERIZATION N/A 2022    Procedure: Left Heart Cath;  Surgeon: Michael Davis MD;  Location:  [facility]ATH [facility];  Service: Cardiology;  Laterality: N/A;   • COLONOSCOPY     • EXCISION LESION      RIGHT SHOULDER ABCESSES   • EYE SURGERY     • HYSTERECTOMY     • TOTAL HIP ARTHROPLASTY Right 2018    Procedure: RIGHT TOTAL HIP REPLACEMENT;  Surgeon: Waldemar Serrano MD;  Location:  [facility] OR;  Service: Orthopedics       Social History     Socioeconomic History   • Marital status:    Tobacco Use   • Smoking status: Former     Packs/day: 1.00     Years: 15.00     Pack years: 15.00     Types: Cigarettes     Quit date:      Years since quittin.2   • Smokeless tobacco: Never   Vaping Use  "  • Vaping Use: Never used   Substance and Sexual Activity   • Alcohol use: No   • Drug use: No   • Sexual activity: Defer       Family History   Problem Relation Age of Onset   • Diabetes Father    • Heart disease Father    • Aneurysm Father    • Cancer Sister    • Cancer Brother    • Heart disease Brother    • Heart disease Brother    • Diabetes Brother    • Cancer Brother    • Cancer Sister    • Alzheimer's disease Sister        Objective    Ht 157.5 cm (62\")   Wt 55.3 kg (122 lb)   BMI 22.31 kg/m²     Physical Exam  Vitals reviewed.   Constitutional:       General: She is not in acute distress.     Appearance: Normal appearance. She is not toxic-appearing.   HENT:      Head: Normocephalic and atraumatic.   Pulmonary:      Effort: Pulmonary effort is normal.   Skin:     Coloration: Skin is not pale.   Neurological:      Mental Status: She is alert and oriented to person, place, and time.   Psychiatric:         Mood and Affect: Mood normal.         Behavior: Behavior normal.             Results for orders placed or performed in visit on 03/24/23   POC Urinalysis Dipstick, Multipro    Specimen: Urine   Result Value Ref Range    Color Yellow Yellow, Straw, Dark Yellow, Adina    Clarity, UA Clear Clear    Glucose,  mg/dL (A) Negative mg/dL    Bilirubin Negative Negative    Ketones, UA Negative Negative    Specific Gravity  1.010 1.005 - 1.030    Blood, UA Small (A) Negative    pH, Urine 7.0 5.0 - 8.0    Protein, POC Negative Negative mg/dL    Urobilinogen, UA Normal Normal, 0.2 E.U./dL    Nitrite, UA Positive (A) Negative    Leukocytes Small (1+) (A) Negative     Assessment and Plan    Diagnoses and all orders for this visit:    1. Recurrent UTI (Primary)  -     POC Urinalysis Dipstick, Multipro  -     nitrofurantoin (MACRODANTIN) 50 MG capsule; Take 1 capsule by mouth Every Night.  Dispense: 90 capsule; Refill: 0    2. Incomplete bladder emptying    3. Pancreatic cyst  -     MRI abdomen w wo contrast; " Future    Patient with chronic colonization I believe due to self-catheterization that she does for incomplete bladder emptying I did start her on bethanechol although this has mixed response just to see if we could empty her bladder better.  Her CT scan showed bladder wall thickening which could be just over read from bladder under distention or also from history of cystitis.  There is mild dilatation of the left renal pelvis.  I feel this is not clinically significant.  Having any flank pain.  There are 4 small cyst cyst seen within the head and neck of the pancreas and radiologist recommends follow-up MRI in 6 months which I went ahead and scheduled and ordered.  Other findings I will send CT report to her primary care physician.

## 2023-03-24 ENCOUNTER — HOSPITAL ENCOUNTER (OUTPATIENT)
Dept: CT IMAGING | Facility: HOSPITAL | Age: 83
Discharge: HOME | End: 2023-03-24
Admitting: PHYSICIAN ASSISTANT
Payer: MEDICARE

## 2023-03-24 ENCOUNTER — OFFICE VISIT (OUTPATIENT)
Dept: UROLOGY | Facility: CLINIC | Age: 83
End: 2023-03-24
Payer: MEDICARE

## 2023-03-24 VITALS — BODY MASS INDEX: 22.45 KG/M2 | WEIGHT: 122 LBS | HEIGHT: 62 IN

## 2023-03-24 DIAGNOSIS — N39.0 RECURRENT UTI: Primary | ICD-10-CM

## 2023-03-24 DIAGNOSIS — K86.2 PANCREATIC CYST: ICD-10-CM

## 2023-03-24 DIAGNOSIS — R33.9 INCOMPLETE BLADDER EMPTYING: ICD-10-CM

## 2023-03-24 DIAGNOSIS — N39.0 RECURRENT UTI: ICD-10-CM

## 2023-03-24 LAB
BILIRUB BLD-MCNC: NEGATIVE MG/DL
CLARITY, POC: CLEAR
COLOR UR: YELLOW
CREAT BLDA-MCNC: 0.8 MG/DL (ref 0.6–1.3)
GLUCOSE UR STRIP-MCNC: ABNORMAL MG/DL
KETONES UR QL: NEGATIVE
LEUKOCYTE EST, POC: ABNORMAL
NITRITE UR-MCNC: POSITIVE MG/ML
PH UR: 7 [PH] (ref 5–8)
PROT UR STRIP-MCNC: NEGATIVE MG/DL
RBC # UR STRIP: ABNORMAL /UL
SP GR UR: 1.01 (ref 1–1.03)
UROBILINOGEN UR QL: NORMAL

## 2023-03-24 PROCEDURE — 87186 SC STD MICRODIL/AGAR DIL: CPT | Performed by: PHYSICIAN ASSISTANT

## 2023-03-24 PROCEDURE — 87086 URINE CULTURE/COLONY COUNT: CPT | Performed by: PHYSICIAN ASSISTANT

## 2023-03-24 PROCEDURE — 25510000001 IOPAMIDOL PER 1 ML: Performed by: PHYSICIAN ASSISTANT

## 2023-03-24 PROCEDURE — 82565 ASSAY OF CREATININE: CPT

## 2023-03-24 PROCEDURE — 87088 URINE BACTERIA CULTURE: CPT | Performed by: PHYSICIAN ASSISTANT

## 2023-03-24 PROCEDURE — 81001 URINALYSIS AUTO W/SCOPE: CPT | Performed by: PHYSICIAN ASSISTANT

## 2023-03-24 PROCEDURE — 99214 OFFICE O/P EST MOD 30 MIN: CPT | Performed by: PHYSICIAN ASSISTANT

## 2023-03-24 PROCEDURE — 74178 CT ABD&PLV WO CNTR FLWD CNTR: CPT

## 2023-03-24 RX ORDER — NITROFURANTOIN MACROCRYSTALS 50 MG/1
50 CAPSULE ORAL NIGHTLY
Qty: 90 CAPSULE | Refills: 0 | Status: SHIPPED | OUTPATIENT
Start: 2023-03-24

## 2023-03-24 RX ADMIN — IOPAMIDOL 100 ML: 755 INJECTION, SOLUTION INTRAVENOUS at 10:14

## 2023-03-26 LAB — BACTERIA SPEC AEROBE CULT: ABNORMAL

## 2023-03-27 DIAGNOSIS — N39.0 URINARY TRACT INFECTION WITHOUT HEMATURIA, SITE UNSPECIFIED: Primary | ICD-10-CM

## 2023-03-27 RX ORDER — CEFDINIR 300 MG/1
300 CAPSULE ORAL 2 TIMES DAILY
Qty: 20 CAPSULE | Refills: 0 | Status: SHIPPED | OUTPATIENT
Start: 2023-03-27 | End: 2023-04-06

## 2023-04-10 ENCOUNTER — TELEPHONE (OUTPATIENT)
Dept: UROLOGY | Facility: CLINIC | Age: 83
End: 2023-04-10
Payer: COMMERCIAL

## 2023-04-10 NOTE — TELEPHONE ENCOUNTER
I called pt and spoke to daughter. Someone has already called her about this message/test results. There is no note that I can find about this. Also, she called last Tuesday and was told Dustin was in clinic but someone would call her back after clinic that day because of the Macrodantin was only covered this one time by insurance but it's not a on-going medication that her insurance will pay for. I can't find a note about that either. Dustin, please advise if there is another medication besides macrodantin that you want to try her on after the 90 day supply.

## 2023-04-10 NOTE — TELEPHONE ENCOUNTER
----- Message from ALISON Sorto sent at 3/27/2023  7:57 AM CDT -----  Regarding: culture  Her urine culture was positive for E. coli I went ahead and sent in prescription for cefdinir for 10 days.  While she is on the cefdinir she should not take the daily antibiotic she will resume daily antibiotic after taking the 10-day course of cefdinir.  ----- Message -----  From: Lab, Background User  Sent: 3/25/2023   6:36 PM CDT  To: ALISON Sorto

## 2023-04-25 ENCOUNTER — OFFICE VISIT (OUTPATIENT)
Dept: CARDIOLOGY | Facility: CLINIC | Age: 83
End: 2023-04-25
Payer: MEDICARE

## 2023-04-25 VITALS
HEIGHT: 62 IN | WEIGHT: 124.6 LBS | DIASTOLIC BLOOD PRESSURE: 88 MMHG | SYSTOLIC BLOOD PRESSURE: 126 MMHG | BODY MASS INDEX: 22.93 KG/M2 | HEART RATE: 81 BPM | OXYGEN SATURATION: 100 %

## 2023-04-25 DIAGNOSIS — I25.2 STATUS POST NON-ST ELEVATION MYOCARDIAL INFARCTION (NSTEMI): ICD-10-CM

## 2023-04-25 DIAGNOSIS — I48.92 ATRIAL FLUTTER WITH CONTROLLED RESPONSE: Primary | ICD-10-CM

## 2023-04-25 DIAGNOSIS — I10 ESSENTIAL HYPERTENSION: ICD-10-CM

## 2023-04-25 DIAGNOSIS — I50.22 CHRONIC SYSTOLIC CHF (CONGESTIVE HEART FAILURE): ICD-10-CM

## 2023-04-25 NOTE — PROGRESS NOTES
Subjective:     Encounter Date: 4/25/2023      Patient ID: Ashley Maher is a 82 y.o. female.    Chief Complaint: follow up NSTEMI, CHF ; now with incidental finding of atrial flutter today    History of Present Illness     The patient presents to follow up regarding her NSTEMI and systolic CHF.     She was admitted 7/24/2022 and established care with Dr. Davis at that time. She reports the night prior she had a 2-3 hr episode of acute chest pain without radiation or other symptoms. Workup revealed type I NSTEMI with a peak troponin of 2.55 and evidence of some lateral Q waves and non specific T wave abnormalities on EKG. She was found to be positive for COVID-19, though did not appear to be symptomatic from that. She was also noted to be hyponatremic. Given her lack of any recurrent chest pain, hemodynamic stability and positive COVID -19 status, her NSTEMI was treated non invasively. She received anticoagulation with lovenox x 48 hr and was started on GDMT with ASA, plavix, high intensity statin, and BB. ACEI was transitioned to ARB at [facility] given her cough. Echo revealed an LVEF of 45%. She was discharged home in stable condition on 7/26 with recommendations per Dr. Davis for 1-2 week follow up, at which point further risk stratification with a nuclear stress test would be considered. She was not referred for cardiac rehab given the need for further ischemic evaluation as an outpatient.     I followed up with the patient in August 2022 and she was doing well.  She had completed physical therapy at home.  She was able to walk around her home in her yard with her dog without any chest discomfort or shortness of breath.  She used a walker for walking longer distances.  She was without complaint aside from some generalized weakness.  She was tolerating her medications.  At that visit, for further risk stratification I did order the Lexiscan.  This was high risk and that Dr. Davis subsequently proceeded with  cardiac catheterization on 9/30/2022.  See full details in cath report-she was found to have near normal coronary arteries but did have an LVEF of 40% and evidence of elevated LVEDP and volume overload on exam that day.  Therefore, Dr. Davis added an SGLT2 inhibitor to her medical therapy and instructed her to continue all other medical therapy.  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    I followed up with the patient in mid January 2023 and she was feeling well.  She denied any chest discomfort, shortness of breath, orthopnea, PND.  She was unable to tell me what medications that she was taking at home.  After completing an accurate medication reconciliation, it was revealed she was not taking her losartan at home, so this was resumed.    She presents today for follow-up and states she is feeling well/about the same compared to last visit.  She admits issues with her balance and generalized weakness.  She is able to walk up and down her driveway without any symptoms.  She denies any chest discomfort, shortness of breath, palpitations, edema, orthopnea, PND or syncope.  She denies any bleeding issues.      The following portions of the patient's history were reviewed and updated as appropriate: allergies, current medications, past family history, past medical history, past social history, past surgical history and problem list.    Review of Systems   Constitutional: Negative for malaise/fatigue.   Cardiovascular: Negative for chest pain, claudication, dyspnea on exertion, leg swelling, near-syncope, orthopnea, palpitations, paroxysmal nocturnal dyspnea and syncope.   Respiratory: Negative for cough and shortness of breath.    Hematologic/Lymphatic: Does not bruise/bleed easily.   Musculoskeletal: Negative for falls.   Gastrointestinal: Negative for bloating.   Neurological: Positive for loss of balance and  "weakness. Negative for dizziness and light-headedness.       Allergies   Allergen Reactions   • Sulfa Antibiotics Other (See Comments)     EXTREMELY LOW BLOOD PRESSURE   • Pyridium [Phenazopyridine Hcl] Swelling     RASH WITH SWELLING FACE   • Tramadol Dizziness     STATES SHE COULDN'T THINK   • Trimethoprim Unknown (See Comments)     RECEIVED FROM [facility], PT UNSURE OF REACTION   • Paba Derivatives Rash     INGREDIENT IN SUNSCREENS       Current Outpatient Medications:   •  aspirin 81 MG EC tablet, Take 1 tablet by mouth Daily., Disp: , Rfl:   •  atorvastatin (LIPITOR) 40 MG tablet, Take 1 tablet by mouth Daily., Disp: , Rfl:   •  bethanechol (URECHOLINE) 10 MG tablet, Take 1 tablet by mouth 3 (Three) Times a Day for 90 days., Disp: 90 tablet, Rfl: 2  •  bimatoprost (LUMIGAN) 0.01 % ophthalmic drops, Administer 1 drop to the right eye Every Night., Disp: , Rfl:   •  carvedilol (COREG) 3.125 MG tablet, Take 1 tablet by mouth 2 (Two) Times a Day With Meals for 90 days., Disp: 180 tablet, Rfl: 0  •  Cholecalciferol (VITAMIN D3) 5000 units capsule capsule, Take 1 capsule by mouth Daily., Disp: , Rfl:   •  dapagliflozin Propanediol (Farxiga) 10 MG tablet, Take 10 mg by mouth Daily., Disp: 30 tablet, Rfl: 11  •  losartan (COZAAR) 25 MG tablet, Take 1 tablet by mouth Daily., Disp: 90 tablet, Rfl: 3  •  montelukast (SINGULAIR) 10 MG tablet, Take 1 tablet by mouth Every Night., Disp: , Rfl:   •  nitrofurantoin (MACRODANTIN) 50 MG capsule, Take 1 capsule by mouth Every Night., Disp: 90 capsule, Rfl: 0  •  nitroglycerin (NITROSTAT) 0.4 MG SL tablet, 1 under the tongue as needed for angina, may repeat q5mins for up three doses, Disp: 25 tablet, Rfl: 1  •  apixaban (ELIQUIS) 2.5 MG tablet tablet, Take 1 tablet by mouth Every 12 (Twelve) Hours., Disp: 60 tablet, Rfl: 11         Objective:    /88   Pulse 81   Ht 157.5 cm (62\")   Wt 56.5 kg (124 lb 9.6 oz)   SpO2 100%   BMI 22.79 kg/m²        Vitals and nursing " note reviewed.   Constitutional:       General: Not in acute distress.     Appearance: Well-developed and not in distress. Not diaphoretic.   Neck:      Vascular: No JVD.   Pulmonary:      Effort: Pulmonary effort is normal. No respiratory distress.      Breath sounds: Normal breath sounds.   Cardiovascular:      Normal rate. Irregular rhythm.      Murmurs: There is no murmur.   Edema:     Peripheral edema absent.   Abdominal:      Tenderness: There is no abdominal tenderness.   Skin:     General: Skin is warm and dry.   Neurological:      Mental Status: Alert and oriented to person, place, and time.         Lab Review:   Lab Results   Component Value Date    GLUCOSE 99 01/17/2023    BUN 13 01/17/2023    CREATININE 0.80 03/24/2023    EGFRIFNONA 109 07/20/2018    BCR 20.3 01/17/2023    K 4.1 01/17/2023    CO2 26.0 01/17/2023    CALCIUM 8.8 01/17/2023    ALBUMIN 4.0 01/17/2023    AST 25 01/17/2023    ALT 18 01/17/2023     Lab Results   Component Value Date    CHOL 113 09/30/2022    TRIG 37 09/30/2022    HDL 58 09/30/2022    LDL 45 09/30/2022     Lab Results   Component Value Date    HGBA1C 5.60 07/24/2022     Lab Results   Component Value Date    WBC 4.65 09/30/2022    HGB 11.7 (L) 09/30/2022    HCT 35.3 09/30/2022    MCV 97.5 (H) 09/30/2022     09/30/2022     No results found for: TSH            ECG 12 Lead    Date/Time: 4/25/2023 3:30 PM  Performed by: Anjali Alcaraz APRN  Authorized by: Anjali Alcaraz APRN   Comparison: compared with previous ECG from 1/17/2023  Comparison to previous ECG: Atrial flutter replaces sinus bradycardia  Rhythm: atrial flutter  BPM: 90              Results for orders placed during the hospital encounter of 07/24/22    Adult Transthoracic Echo Complete W/ Cont if Necessary Per Protocol    Interpretation Summary  · Calculated left ventricular 3D EF = 45% Estimated left ventricular EF = 45% Left ventricular systolic function is mildly decreased.  · The following left ventricular wall  segments are hypokinetic: mid anterior, apical anterior, mid anterolateral, apical lateral and mid inferolateral.  · Left ventricular diastolic function is consistent with (grade II w/high LAP) pseudonormalization.  · Estimated right ventricular systolic pressure from tricuspid regurgitation is mildly elevated (35-45 mmHg).  · Normal size and function of the right ventricle.  · No significant (greater than mild) valvular pathology.    9/30/2022 cath report:    Impression:  1.  Near-normal coronary arteries  2.  LVEF 40% with lateral wall akinesis, not explained by any obstructive coronary disease.  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  Elevated LVEDP     Plan:   1.  2 hours bedrest, then can be discharged home  2.  Continue aspirin 81mg daily indefinitely, and all other current medical therapies including statin, beta-blocker, ARB  3.  In light of reduced EF with elevated LVEDP, as well as bibasilar rales on exam before the procedure, will add SGLT2 inhibitor for better CHF treatment  4.  Follow-up with Anjali Alcaraz in 3 months        Michael Davis MD      Assessment:      Problem List Items Addressed This Visit        Cardiac and Vasculature    Status post non-ST elevation myocardial infarction (NSTEMI)    Overview     Type I NSTEMI 7/24/2022 - medically managed given positive COVID 19 status, without recurrent chest pain following initial event.     Given the non-STEMI, Lexiscan was ordered for further risk stratification and this was high risk and she subsequently underwent cardiac catheterization on 9/30 and this revealed near normal coronary arteries.  It is felt the regional wall motion abnormalities were potentially due to COVID myocarditis at the time of her non-STEMI.         Essential hypertension    Chronic systolic CHF (congestive heart failure)    Overview      LVEF 40-45% with elevated LVEDP on [facility] 9/30/22        Other Visit Diagnoses     Atrial flutter with controlled response    -  Primary    Relevant Orders    Holter Monitor - 72 Hour Up To 15 Days          Plan:     1 atrial flutter: Incidentally noted on EKG today.  New diagnosis.  She is rate controlled with a heart rate of 90 bpm.  She appears to be asymptomatic.  Her [id number] is 6.    -Given her age, weight and renal function she will be started on Eliquis 2.5 mg twice daily for stroke prophylaxis  -Discontinue Plavix as she is now requiring anticoagulation  -Continue current dose of Coreg for rate control  -14-day Holter monitor for assessment of atrial flutter burden, heart rate control and for symptom rhythm correlation.  If atrial flutter is persistent, will consider cardioversion after 4 weeks of uninterrupted anticoagulation to see if she has symptomatic improvement with restoration of normal sinus rhythm.    2. NSTEMI : Stable. No recurrent CP since NSTEMI occurred 7/23-7/24.  She was subsequently found to have near normal coronary arteries per cardiac catheterization 9/30/2022 after Lexiscan for risk stratification was high risk.  It is suspected the etiology for lateral akinesis may be COVID myocarditis (had COVID at time of NSTEMI)    -Continue aspirin, high intensity statin, beta-blocker, as needed sublingual nitroglycerin  -Plavix discontinued today in light of her need for anticoagulation.  May be able to discontinue aspirin in 3 months, after she reaches 1 year from non-STEMI, as she did not require PCI.    3. Hypertension: Now appears to be well controlled.  Continue losartan and Coreg.    4. Hyperlipidemia: LDL well controlled at 45 continue atorvastatin 40, with goal LDL less than 70.    5.  Chronic systolic CHF: She appears compensated/euvolemic on exam today and is denying dyspnea, rapid weight gain, orthopnea, PND.  Continue Coreg, losartan and Farxiga. We reviewed signs and symptoms  consistent with acute CHF and what to report.  Weigh daily.  Heart healthy low-sodium diet.    Follow-up again in 4 weeks, call sooner with symptoms or concerns    I spent 39 minutes caring for Ashley on this date of service. This time includes time spent by me in the following activities: preparing for the visit, reviewing tests, obtaining and/or reviewing a separately obtained history, performing a medically appropriate examination and/or evaluation, counseling and educating the patient/family/caregiver, ordering medications, tests, or procedures and documenting information in the medical record

## 2023-05-03 ENCOUNTER — TELEPHONE (OUTPATIENT)
Dept: UROLOGY | Facility: CLINIC | Age: 83
End: 2023-05-03
Payer: COMMERCIAL

## 2023-05-03 NOTE — TELEPHONE ENCOUNTER
CALLED PT TO SCHEDULE APPT DUE TO RECEIVING A MESSAGE FROM [facility] ABOUT PT NEEDING AN URGENT APPT. DAUGHTER AND PT DID NOT KNOW WHAT WAS GOING ON AND WANTED TO CALL PCP BEFORE SCHEDULING APPT. IF PT OR DAUGHTER C/B OK TO SCHEDULE WITH RICHI GALLARDO.

## 2023-05-04 NOTE — PROGRESS NOTES
Subjective    Ms. Maher is 82 y.o. female    Chief Complaint: Urinary Tract Infection      History of Present Illness  Patient with history of incomplete bladder emptying and recurrent urinary tract infections she has a history of incomplete bladder emptying and performs self catheterizations 4 times daily.  She had no difficulty self cathing since she was last seen as far she knows she has not had a urinary tract infection she does have urine typical for chronic colonization as it is today.  Denies any UTI type symptoms fever chills flank pain mental status changes fatigue etc.  She states that her insurance will not cover daily Macrodantin or nitrofurantoin.  They covered her last prescription instead they will not after this.  She is also on Urecholine.    The following portions of the patient's history were reviewed and updated as appropriate: allergies, current medications, past family history, past medical history, past social history, past surgical history and problem list.    Review of Systems   Constitutional: Negative for chills and fever.   Genitourinary: Positive for difficulty urinating. Negative for dysuria, flank pain and hematuria.   All other systems reviewed and are negative.        Current Outpatient Medications:   •  apixaban (ELIQUIS) 2.5 MG tablet tablet, Take 1 tablet by mouth Every 12 (Twelve) Hours., Disp: 60 tablet, Rfl: 11  •  aspirin 81 MG EC tablet, Take 1 tablet by mouth Daily., Disp: , Rfl:   •  atorvastatin (LIPITOR) 40 MG tablet, Take 1 tablet by mouth Daily., Disp: , Rfl:   •  bethanechol (URECHOLINE) 10 MG tablet, Take 1 tablet by mouth 3 (Three) Times a Day for 90 days., Disp: 90 tablet, Rfl: 2  •  bimatoprost (LUMIGAN) 0.01 % ophthalmic drops, Administer 1 drop to the right eye Every Night., Disp: , Rfl:   •  carvedilol (COREG) 3.125 MG tablet, Take 1 tablet by mouth 2 (Two) Times a Day With Meals for 90 days., Disp: 180 tablet, Rfl: 0  •  Cholecalciferol (VITAMIN D3) 5000 units  capsule capsule, Take 1 capsule by mouth Daily., Disp: , Rfl:   •  dapagliflozin Propanediol (Farxiga) 10 MG tablet, Take 10 mg by mouth Daily., Disp: 30 tablet, Rfl: 11  •  losartan (COZAAR) 25 MG tablet, Take 1 tablet by mouth Daily., Disp: 90 tablet, Rfl: 3  •  montelukast (SINGULAIR) 10 MG tablet, Take 1 tablet by mouth Every Night., Disp: , Rfl:   •  nitrofurantoin (MACRODANTIN) 50 MG capsule, Take 1 capsule by mouth Every Night., Disp: 90 capsule, Rfl: 0  •  nitroglycerin (NITROSTAT) 0.4 MG SL tablet, 1 under the tongue as needed for angina, may repeat q5mins for up three doses, Disp: 25 tablet, Rfl: 1    Past Medical History:   Diagnosis Date   • Arthritis    • Cataract     BILAT   • Electrolyte imbalance    • Hypertension    • Vitamin D deficiency        Past Surgical History:   Procedure Laterality Date   • ANTERIOR AND POSTERIOR VAGINAL REPAIR     • APPENDECTOMY     • CARDIAC CATHETERIZATION N/A 2022    Procedure: Left Heart Cath;  Surgeon: Michael Davis MD;  Location:  [facility];  Service: Cardiology;  Laterality: N/A;   • COLONOSCOPY     • EXCISION LESION      RIGHT SHOULDER ABCESSES   • EYE SURGERY     • HYSTERECTOMY     • TOTAL HIP ARTHROPLASTY Right 2018    Procedure: RIGHT TOTAL HIP REPLACEMENT;  Surgeon: Waldemar Serrano MD;  Location: [facility] OR;  Service: Orthopedics       Social History     Socioeconomic History   • Marital status:    Tobacco Use   • Smoking status: Former     Packs/day: 1.00     Years: 15.00     Pack years: 15.00     Types: Cigarettes     Quit date:      Years since quittin.3   • Smokeless tobacco: Never   Vaping Use   • Vaping Use: Never used   Substance and Sexual Activity   • Alcohol use: No   • Drug use: No   • Sexual activity: Defer       Family History   Problem Relation Age of Onset   • Diabetes Father    • Heart disease Father    • Aneurysm Father    • Cancer Sister    • Cancer Brother    • Heart disease Brother    • Heart  "disease Brother    • Diabetes Brother    • Cancer Brother    • Cancer Sister    • Alzheimer's disease Sister        Objective    Temp 97.1 °F (36.2 °C)   Ht 157.5 cm (62\")   Wt 56.7 kg (125 lb)   BMI 22.86 kg/m²     Physical Exam  Vitals reviewed.   Constitutional:       Appearance: Normal appearance.   HENT:      Head: Normocephalic and atraumatic.   Pulmonary:      Effort: Pulmonary effort is normal.   Skin:     Coloration: Skin is not pale.   Neurological:      Mental Status: She is alert and oriented to person, place, and time.   Psychiatric:         Mood and Affect: Mood normal.         Behavior: Behavior normal.             Results for orders placed or performed in visit on 05/08/23   POC Urinalysis Dipstick, Multipro    Specimen: Urine   Result Value Ref Range    Color Yellow Yellow, Straw, Dark Yellow, Adina    Clarity, UA Cloudy (A) Clear    Glucose,  mg/dL (A) Negative mg/dL    Bilirubin Negative Negative    Ketones, UA Negative Negative    Specific Gravity  1.015 1.005 - 1.030    Blood, UA Small (A) Negative    pH, Urine 6.5 5.0 - 8.0    Protein, POC Negative Negative mg/dL    Urobilinogen, UA 0.2 E.U./dL Normal, 0.2 E.U./dL    Nitrite, UA Positive (A) Negative    Leukocytes Small (1+) (A) Negative   Before microscopic evaluation the patient's urine was not able to see any bacteria although it was nitrate positive I saw few red cells and leukocytes.    Estimation of residual urine via abdominal ultrasound  Residual Urine: 289 ml  Indication:   Position: Supine  Examination: Incremental scanning of the suprapubic area using 3 MHz transducer using copious amounts of acoustic gel.   Findings: An anechoic area was demonstrated which represented the bladder, with measurement of residual urine as noted. I inspected this myself. In that the residual urine was stable or insignificant, no treatment will be necessary at this time.       Assessment and Plan    Diagnoses and all orders for this visit:    1. " Urinary tract infection without hematuria, site unspecified (Primary)  -     POC Urinalysis Dipstick, Multipro  -     Urine Culture - Urine, Urine, Random Void; Future  -     Urine Culture - Urine, Urine, Random Void    2. Incomplete bladder emptying    Patient with incomplete bladder emptying she performs self catheterizations 4 times daily without difficulty.  Her urine was suspicious for UTI however I feel this is chronic colonization due to her self cathing because she is not symptomatic.  We will send for culture but most likely will not treat but she could become symptomatic which I explained to her fever chills new onset back pain mental status changes.  She will follow-up as scheduled she is getting an MRI I ordered for follow-up cyst within the head and neck of the pancreas.

## 2023-05-08 ENCOUNTER — OFFICE VISIT (OUTPATIENT)
Dept: UROLOGY | Facility: CLINIC | Age: 83
End: 2023-05-08
Payer: MEDICARE

## 2023-05-08 VITALS — BODY MASS INDEX: 23 KG/M2 | WEIGHT: 125 LBS | HEIGHT: 62 IN | TEMPERATURE: 97.1 F

## 2023-05-08 DIAGNOSIS — N39.0 URINARY TRACT INFECTION WITHOUT HEMATURIA, SITE UNSPECIFIED: Primary | ICD-10-CM

## 2023-05-08 DIAGNOSIS — R33.9 INCOMPLETE BLADDER EMPTYING: ICD-10-CM

## 2023-05-08 LAB
BILIRUB BLD-MCNC: NEGATIVE MG/DL
CLARITY, POC: ABNORMAL
COLOR UR: YELLOW
GLUCOSE UR STRIP-MCNC: ABNORMAL MG/DL
KETONES UR QL: NEGATIVE
LEUKOCYTE EST, POC: ABNORMAL
NITRITE UR-MCNC: POSITIVE MG/ML
PH UR: 6.5 [PH] (ref 5–8)
PROT UR STRIP-MCNC: NEGATIVE MG/DL
RBC # UR STRIP: ABNORMAL /UL
SP GR UR: 1.01 (ref 1–1.03)
UROBILINOGEN UR QL: ABNORMAL

## 2023-05-08 PROCEDURE — 99214 OFFICE O/P EST MOD 30 MIN: CPT | Performed by: PHYSICIAN ASSISTANT

## 2023-05-08 PROCEDURE — 87086 URINE CULTURE/COLONY COUNT: CPT | Performed by: PHYSICIAN ASSISTANT

## 2023-05-08 PROCEDURE — 81001 URINALYSIS AUTO W/SCOPE: CPT | Performed by: PHYSICIAN ASSISTANT

## 2023-05-08 PROCEDURE — 1159F MED LIST DOCD IN RCRD: CPT | Performed by: PHYSICIAN ASSISTANT

## 2023-05-08 PROCEDURE — 51798 US URINE CAPACITY MEASURE: CPT | Performed by: PHYSICIAN ASSISTANT

## 2023-05-08 PROCEDURE — 87186 SC STD MICRODIL/AGAR DIL: CPT | Performed by: PHYSICIAN ASSISTANT

## 2023-05-08 PROCEDURE — 1160F RVW MEDS BY RX/DR IN RCRD: CPT | Performed by: PHYSICIAN ASSISTANT

## 2023-05-10 ENCOUNTER — TELEPHONE (OUTPATIENT)
Dept: UROLOGY | Facility: CLINIC | Age: 83
End: 2023-05-10
Payer: COMMERCIAL

## 2023-05-10 NOTE — TELEPHONE ENCOUNTER
----- Message from ALISON Sorto sent at 5/10/2023  3:56 PM CDT -----  Regarding: Urine culture  Let patient know her urine culture did grow bacteria most likely contaminant from performing intermittent catheterization she is not symptomatic and per AUA guidelines we do not recommend treating asymptomatic bacteriuria with antibiotics.  ----- Message -----  From: Xiao Harley MA  Sent: 5/8/2023  10:34 AM CDT  To: ALISON Sorto

## 2023-05-11 LAB — BACTERIA SPEC AEROBE CULT: ABNORMAL

## 2023-05-23 ENCOUNTER — OFFICE VISIT (OUTPATIENT)
Dept: CARDIOLOGY | Facility: CLINIC | Age: 83
End: 2023-05-23
Payer: MEDICARE

## 2023-05-23 VITALS
OXYGEN SATURATION: 98 % | SYSTOLIC BLOOD PRESSURE: 124 MMHG | DIASTOLIC BLOOD PRESSURE: 78 MMHG | BODY MASS INDEX: 22.67 KG/M2 | HEART RATE: 62 BPM | HEIGHT: 62 IN | WEIGHT: 123.2 LBS

## 2023-05-23 DIAGNOSIS — I50.22 CHRONIC SYSTOLIC CHF (CONGESTIVE HEART FAILURE): ICD-10-CM

## 2023-05-23 DIAGNOSIS — I10 ESSENTIAL HYPERTENSION: ICD-10-CM

## 2023-05-23 DIAGNOSIS — I25.2 STATUS POST NON-ST ELEVATION MYOCARDIAL INFARCTION (NSTEMI): ICD-10-CM

## 2023-05-23 DIAGNOSIS — I48.92 ATRIAL FLUTTER WITH CONTROLLED RESPONSE: Primary | ICD-10-CM

## 2023-05-23 NOTE — PROGRESS NOTES
Subjective:     Encounter Date: 5/23/2023      Patient ID: Ashley Maher is a 82 y.o. female.    Chief Complaint: follow up NSTEMI, CHF, atrial flutter     History of Present Illness     The patient presents to follow up regarding her NSTEMI and systolic CHF.     She was admitted 7/24/2022 and established care with Dr. Davis at that time. She reports the night prior she had a 2-3 hr episode of acute chest pain without radiation or other symptoms. Workup revealed type I NSTEMI with a peak troponin of 2.55 and evidence of some lateral Q waves and non specific T wave abnormalities on EKG. She was found to be positive for COVID-19, though did not appear to be symptomatic from that. She was also noted to be hyponatremic. Given her lack of any recurrent chest pain, hemodynamic stability and positive COVID -19 status, her NSTEMI was treated non invasively. She received anticoagulation with lovenox x 48 hr and was started on GDMT with ASA, plavix, high intensity statin, and BB. ACEI was transitioned to ARB at dc given her cough. Echo revealed an LVEF of 45%. She was discharged home in stable condition on 7/26 with recommendations per Dr. Davis for 1-2 week follow up, at which point further risk stratification with a nuclear stress test would be considered. She was not referred for cardiac rehab given the need for further ischemic evaluation as an outpatient.     I followed up with the patient in August 2022 and she was doing well.  She had completed physical therapy at home.  She was able to walk around her home in her yard with her dog without any chest discomfort or shortness of breath.  She used a walker for walking longer distances.  She was without complaint aside from some generalized weakness.  She was tolerating her medications.  At that visit, for further risk stratification I did order the Lexiscan.  This was high risk and that Dr. Davis subsequently proceeded with cardiac catheterization on 9/30/2022.  See  "full details in cath report-she was found to have near normal coronary arteries but did have an LVEF of 40% and evidence of elevated LVEDP and volume overload on exam that day.  Therefore, Dr. Davis added an SGLT2 inhibitor to her medical therapy and instructed her to continue all other medical therapy.  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    I followed up with the patient in mid January 2023 and she was feeling well.  She denied any chest discomfort, shortness of breath, orthopnea, PND.  She was unable to tell me what medications that she was taking at home.  After completing an accurate medication reconciliation, it was revealed she was not taking her losartan at home, so this was resumed.    I followed up with her on 4/25 and she was feeling well/about the same compared to last visit.  She reported issues with her balance and generalized weakness.  She stated she was able to walk up and down her driveway without any symptoms.  She denied any chest discomfort, shortness of breath, orthopnea, PND.  She was incidentally found to be in rate controlled atrial flutter that day.  This was a new diagnosis.  Plavix was discontinued and she was started on Eliquis 2.5 mg twice daily.  She was placed in a 14-day Holter monitor.  See results below.    Today the patient presents for follow-up and states she is doing well.  Her only complaint is she feels \"lazy and tired all the time.\"  She denies shortness of breath, edema, weight gain, orthopnea, PND, chest pain.  She also denies syncope or presyncope.  She continues to have chronic issues with her balance.  She reports that she has been taking her Eliquis twice daily without missed doses since this was started 4 weeks ago.  She denies any falls or bleeding issues.      The following portions of the patient's history were reviewed and updated as appropriate: allergies, " current medications, past family history, past medical history, past social history, past surgical history and problem list.    Review of Systems   Constitutional: Positive for malaise/fatigue.   Cardiovascular:  Negative for chest pain, claudication, dyspnea on exertion, leg swelling, near-syncope, orthopnea, palpitations, paroxysmal nocturnal dyspnea and syncope.   Respiratory:  Negative for cough and shortness of breath.    Hematologic/Lymphatic: Does not bruise/bleed easily.   Musculoskeletal:  Negative for falls.   Gastrointestinal:  Negative for bloating.   Neurological:  Positive for loss of balance. Negative for dizziness, light-headedness and weakness.     Allergies   Allergen Reactions    Sulfa Antibiotics Other (See Comments)     EXTREMELY LOW BLOOD PRESSURE    Pyridium [Phenazopyridine Hcl] Swelling     RASH WITH SWELLING FACE    Tramadol Dizziness     STATES SHE COULDN'T THINK    Trimethoprim Unknown (See Comments)     RECEIVED FROM [facility], PT UNSURE OF REACTION    Paba Derivatives Rash     INGREDIENT IN SUNSCREENS       Current Outpatient Medications:     apixaban (ELIQUIS) 2.5 MG tablet tablet, Take 1 tablet by mouth Every 12 (Twelve) Hours., Disp: 60 tablet, Rfl: 11    aspirin 81 MG EC tablet, Take 1 tablet by mouth Daily., Disp: , Rfl:     atorvastatin (LIPITOR) 40 MG tablet, Take 1 tablet by mouth Daily., Disp: , Rfl:     bethanechol (URECHOLINE) 10 MG tablet, Take 1 tablet by mouth 3 (Three) Times a Day for 90 days., Disp: 90 tablet, Rfl: 2    bimatoprost (LUMIGAN) 0.01 % ophthalmic drops, Administer 1 drop to the right eye Every Night., Disp: , Rfl:     carvedilol (COREG) 3.125 MG tablet, Take 1 tablet by mouth 2 (Two) Times a Day With Meals for 90 days., Disp: 180 tablet, Rfl: 0    Cholecalciferol (VITAMIN D3) 5000 units capsule capsule, Take 1 capsule by mouth Daily., Disp: , Rfl:     dapagliflozin Propanediol (Farxiga) 10 MG tablet, Take 10 mg by mouth Daily., Disp: 30 tablet, Rfl:  "11    losartan (COZAAR) 25 MG tablet, Take 1 tablet by mouth Daily., Disp: 90 tablet, Rfl: 3    montelukast (SINGULAIR) 10 MG tablet, Take 1 tablet by mouth Every Night., Disp: , Rfl:     nitrofurantoin (MACRODANTIN) 50 MG capsule, Take 1 capsule by mouth Every Night., Disp: 90 capsule, Rfl: 0    nitroglycerin (NITROSTAT) 0.4 MG SL tablet, 1 under the tongue as needed for angina, may repeat q5mins for up three doses, Disp: 25 tablet, Rfl: 1         Objective:    /78   Pulse 62   Ht 157.5 cm (62\")   Wt 55.9 kg (123 lb 3.2 oz)   SpO2 98%   BMI 22.53 kg/m²        Vitals and nursing note reviewed.   Constitutional:       General: Not in acute distress.     Appearance: Well-developed and not in distress. Not diaphoretic.   Neck:      Vascular: No JVD.   Pulmonary:      Effort: Pulmonary effort is normal. No respiratory distress.      Breath sounds: Normal breath sounds.   Cardiovascular:      Normal rate. Irregular rhythm.      Murmurs: There is no murmur.   Edema:     Peripheral edema absent.   Abdominal:      Tenderness: There is no abdominal tenderness.   Skin:     General: Skin is warm and dry.   Neurological:      Mental Status: Alert and oriented to person, place, and time.       Lab Review:   Lab Results   Component Value Date    GLUCOSE 99 01/17/2023    BUN 13 01/17/2023    CREATININE 0.80 03/24/2023    EGFRIFNONA 109 07/20/2018    BCR 20.3 01/17/2023    K 4.1 01/17/2023    CO2 26.0 01/17/2023    CALCIUM 8.8 01/17/2023    ALBUMIN 4.0 01/17/2023    AST 25 01/17/2023    ALT 18 01/17/2023     Lab Results   Component Value Date    CHOL 113 09/30/2022    TRIG 37 09/30/2022    HDL 58 09/30/2022    LDL 45 09/30/2022     Lab Results   Component Value Date    HGBA1C 5.60 07/24/2022     Lab Results   Component Value Date    WBC 4.65 09/30/2022    HGB 11.7 (L) 09/30/2022    HCT 35.3 09/30/2022    MCV 97.5 (H) 09/30/2022     09/30/2022     No results found for: TSH            ECG 12 Lead    Date/Time: " 5/23/2023 10:34 AM  Performed by: Anjali Alcaraz APRN  Authorized by: Anjali Alcaraz APRN   Comparison: compared with previous ECG from 4/25/2023  Similar to previous ECG  Comparison to previous ECG: T wave inversions I and aVL   Rhythm: atrial flutter  BPM: 72        Results for orders placed during the hospital encounter of 07/24/22    Adult Transthoracic Echo Complete W/ Cont if Necessary Per Protocol    Interpretation Summary  · Calculated left ventricular 3D EF = 45% Estimated left ventricular EF = 45% Left ventricular systolic function is mildly decreased.  · The following left ventricular wall segments are hypokinetic: mid anterior, apical anterior, mid anterolateral, apical lateral and mid inferolateral.  · Left ventricular diastolic function is consistent with (grade II w/high LAP) pseudonormalization.  · Estimated right ventricular systolic pressure from tricuspid regurgitation is mildly elevated (35-45 mmHg).  · Normal size and function of the right ventricle.  · No significant (greater than mild) valvular pathology.    9/30/2022 cath report:    Impression:  1.  Near-normal coronary arteries  2.  LVEF 40% with lateral wall akinesis, not explained by any obstructive coronary disease.  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  Elevated LVEDP     Plan:   1.  2 hours bedrest, then can be discharged home  2.  Continue aspirin 81mg daily indefinitely, and all other current medical therapies including statin, beta-blocker, ARB  3.  In light of reduced EF with elevated LVEDP, as well as bibasilar rales on exam before the procedure, will add SGLT2 inhibitor for better CHF treatment  4.  Follow-up with Anjali Alcaraz in 3 months        Michael Davis MD    4/2023 14 day holter:      An abnormal monitor study.    Predominant rhythm was atrial flutter.    100% burden of atrial  flutter, with average ventricular rate 88 bpm (range ).    Patient never triggered the device or reported any symptoms.       Assessment:      Problem List Items Addressed This Visit          Cardiac and Vasculature    Status post non-ST elevation myocardial infarction (NSTEMI)    Overview     Type I NSTEMI 7/24/2022 - medically managed given positive COVID 19 status, without recurrent chest pain following initial event.     Given the non-STEMI, Lexiscan was ordered for further risk stratification and this was high risk and she subsequently underwent cardiac catheterization on 9/30 and this revealed near normal coronary arteries.  It is felt the regional wall motion abnormalities were potentially due to COVID myocarditis at the time of her non-STEMI.         Essential hypertension    Chronic systolic CHF (congestive heart failure)    Overview     LVEF 40-45% with elevated LVEDP on [facility] 9/30/22          Other Visit Diagnoses       Atrial flutter with controlled response    -  Primary    Relevant Orders    Cardioversion External in Cardiology Department            Plan:     1 atrial flutter: Incidentally noted on EKG 4/25/23-new diagnosis at that time.  She was placed in a 14-day Holter monitor at that time, which revealed persistent rate controlled atrial flutter.  Her [id number] is 6.  Is unclear if she is symptomatic to this.  She does complain of some fatigue which may be related to the atrial flutter.    -Continue Eliquis 2.5 mg twice daily  -Continue current dose of Coreg for rate control  -Schedule cardioversion to see if she will have symptomatic improvement with restoration of normal sinus rhythm    2. NSTEMI : Stable. No recurrent CP since NSTEMI occurred 7/23-7/24.  She was subsequently found to have near normal coronary arteries per cardiac catheterization 9/30/2022 after Lexiscan for risk stratification was high risk.  It is suspected the etiology for lateral akinesis may be COVID myocarditis (had  COVID at time of NSTEMI)    -Continue aspirin, high intensity statin, beta-blocker, as needed sublingual nitroglycerin  -Plavix discontinued 4/25 in light of her need for anticoagulation.  May be able to discontinue aspirin in 2 months, after she reaches 1 year from non-STEMI, as she did not require PCI.    3. Hypertension: Now appears to be well controlled.  Continue losartan and Coreg.    4. Hyperlipidemia: LDL well controlled at 45 continue atorvastatin 40, with goal LDL less than 70.    5.  Chronic systolic CHF: She appears compensated/euvolemic on exam today and is denying dyspnea, rapid weight gain, orthopnea, PND.  Continue Coreg, losartan and Farxiga. We reviewed signs and symptoms consistent with acute CHF and what to report.  Weigh daily.  Heart healthy low-sodium diet.    Tentatively plan for follow-up 4 to 6 weeks after cardioversion.    I spent 33 minutes caring for Ashley on this date of service. This time includes time spent by me in the following activities: preparing for the visit, reviewing tests, obtaining and/or reviewing a separately obtained history, performing a medically appropriate examination and/or evaluation, counseling and educating the patient/family/caregiver, ordering medications, tests, or procedures and documenting information in the medical record

## 2023-05-31 ENCOUNTER — HOSPITAL ENCOUNTER (OUTPATIENT)
Dept: CARDIOLOGY | Facility: HOSPITAL | Age: 83
Discharge: HOME | End: 2023-05-31

## 2023-05-31 ENCOUNTER — ANESTHESIA EVENT (OUTPATIENT)
Dept: CARDIOLOGY | Facility: HOSPITAL | Age: 83
End: 2023-05-31

## 2023-05-31 ENCOUNTER — ANESTHESIA (OUTPATIENT)
Dept: CARDIOLOGY | Facility: HOSPITAL | Age: 83
End: 2023-05-31

## 2023-05-31 VITALS
RESPIRATION RATE: 15 BRPM | WEIGHT: 116 LBS | HEIGHT: 62 IN | TEMPERATURE: 97.5 F | SYSTOLIC BLOOD PRESSURE: 152 MMHG | OXYGEN SATURATION: 100 % | DIASTOLIC BLOOD PRESSURE: 98 MMHG | HEART RATE: 61 BPM | BODY MASS INDEX: 21.35 KG/M2

## 2023-05-31 DIAGNOSIS — I48.92 ATRIAL FLUTTER WITH CONTROLLED RESPONSE: ICD-10-CM

## 2023-05-31 PROBLEM — Z79.01 CHRONIC ANTICOAGULATION: Chronic | Status: ACTIVE | Noted: 2023-05-31

## 2023-05-31 PROBLEM — Z79.01 CHRONIC ANTICOAGULATION: Status: ACTIVE | Noted: 2023-05-31

## 2023-05-31 PROBLEM — I48.19 ATRIAL FIBRILLATION, PERSISTENT: Status: ACTIVE | Noted: 2023-05-31

## 2023-05-31 PROBLEM — I48.19 ATRIAL FIBRILLATION, PERSISTENT: Chronic | Status: ACTIVE | Noted: 2023-05-31

## 2023-05-31 PROCEDURE — 93246 EXT ECG>7D<15D RECORDING: CPT

## 2023-05-31 PROCEDURE — 93005 ELECTROCARDIOGRAM TRACING: CPT | Performed by: INTERNAL MEDICINE

## 2023-05-31 PROCEDURE — 25010000002 PROPOFOL 1000 MG/100ML EMULSION

## 2023-05-31 PROCEDURE — 92960 CARDIOVERSION ELECTRIC EXT: CPT

## 2023-05-31 RX ORDER — LIDOCAINE HYDROCHLORIDE 20 MG/ML
INJECTION, SOLUTION EPIDURAL; INFILTRATION; INTRACAUDAL; PERINEURAL AS NEEDED
Status: DISCONTINUED | OUTPATIENT
Start: 2023-05-31 | End: 2023-05-31 | Stop reason: SURG

## 2023-05-31 RX ORDER — SODIUM CHLORIDE 9 MG/ML
INJECTION, SOLUTION INTRAVENOUS CONTINUOUS PRN
Status: DISCONTINUED | OUTPATIENT
Start: 2023-05-31 | End: 2023-05-31 | Stop reason: SURG

## 2023-05-31 RX ORDER — PROPOFOL 10 MG/ML
INJECTION, EMULSION INTRAVENOUS AS NEEDED
Status: DISCONTINUED | OUTPATIENT
Start: 2023-05-31 | End: 2023-05-31 | Stop reason: SURG

## 2023-05-31 RX ADMIN — PROPOFOL 40 MG: 10 INJECTION, EMULSION INTRAVENOUS at 11:30

## 2023-05-31 RX ADMIN — SODIUM CHLORIDE: 9 INJECTION, SOLUTION INTRAVENOUS at 11:18

## 2023-05-31 RX ADMIN — LIDOCAINE HYDROCHLORIDE 80 MG: 20 INJECTION, SOLUTION EPIDURAL; INFILTRATION; INTRACAUDAL; PERINEURAL at 11:30

## 2023-05-31 NOTE — ANESTHESIA PREPROCEDURE EVALUATION
Anesthesia Evaluation     Nursing notes reviewed   no history of anesthetic complications:   NPO Solid Status: > 8 hours  NPO Liquid Status: > 8 hours           Airway   Mallampati: I  TM distance: >3 FB  No difficulty expected  Dental          Pulmonary - negative pulmonary ROS and normal exam   (-) asthma, no home oxygen  Cardiovascular - normal exam    ECG reviewed  Rhythm: regular  Rate: normal    (+) hypertension well controlled, past MI  6-12 monthsCHF Systolic <55%  (-) cardiac stents    ROS comment: Cath 9/26/22   Near-normal coronary arteries  2.  LVEF 40% with lateral wall akinesis, not explained by any obstructive coronary disease.  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  Elevated LVEDP      Neuro/Psych  (+) numbness  (-) seizures, CVA    ROS Comment: Left pinky finger numb since fall.  GI/Hepatic/Renal/Endo - negative ROS     Musculoskeletal     Abdominal  - normal exam   Substance History      OB/GYN negative ob/gyn ROS         Other   arthritis,                     Anesthesia Plan    ASA 3     MAC     intravenous induction     Anesthetic plan, risks, benefits, and alternatives have been provided, discussed and informed consent has been obtained with: patient.    Use of blood products discussed with patient  Consented to blood products.

## 2023-05-31 NOTE — ANESTHESIA POSTPROCEDURE EVALUATION
"Patient: Ashley Maher    Procedure Summary       Date: 05/31/23 Room / Location: [facility] CATH LAB    Anesthesia Start: 1118 Anesthesia Stop: 1142    Procedure: CARDIOVERSION EXTERNAL IN CARDIOLOGY DEPARTMENT Diagnosis:       Atrial flutter with controlled response      (atrial flutter)    Scheduled Providers: Michael Davis MD Provider: Senthil Tolbert CRNA    Anesthesia Type: MAC ASA Status: 3            Anesthesia Type: MAC    Vitals  Vitals Value Taken Time   /79 05/31/23 1146   Temp     Pulse 59 05/31/23 1151   Resp 16 05/31/23 1145   SpO2 97 % 05/31/23 1151   Vitals shown include unvalidated device data.        Post Anesthesia Care and Evaluation    Patient location during evaluation: PHASE II  Patient participation: complete - patient participated  Level of consciousness: awake  Pain management: adequate    Airway patency: patent  Anesthetic complications: No anesthetic complications    Cardiovascular status: acceptable  Respiratory status: acceptable  Hydration status: acceptable    Comments: /79   Pulse 58   Temp 97.5 °F (36.4 °C)   Resp 16   Ht 157.5 cm (62\")   Wt 52.6 kg (116 lb)   SpO2 96%   BMI 21.22 kg/m²       "

## 2023-06-03 LAB
QT INTERVAL: 454 MS
QTC INTERVAL: 449 MS

## 2023-09-11 DIAGNOSIS — K86.2 PANCREATIC CYST: ICD-10-CM

## 2023-09-12 ENCOUNTER — TELEPHONE (OUTPATIENT)
Dept: UROLOGY | Facility: CLINIC | Age: 83
End: 2023-09-12
Payer: COMMERCIAL

## 2023-09-12 NOTE — TELEPHONE ENCOUNTER
Called pt with results, spoke with daughter. She v/u     ----- Message from ALISON Sorto sent at 9/12/2023 11:21 AM CDT -----  Regarding: mri  MRI shows several pancreatic cysts that do not have a suspicious appearance the radiologist recommends repeat MRI per guidelines in 2 years.  ----- Message -----  From: Interface, Scans Incoming  Sent: 9/11/2023   7:42 AM CDT  To: ALISON Sorto

## 2023-09-19 NOTE — PROGRESS NOTES
Subjective    Ms. Maher is 82 y.o. female    Chief Complaint: Recurrent UTI    History of Present Illness  Patient with a history of recurrent urinary tract infections and incomplete bladder emptying which she treats with performing self-catheterization 4 times daily.  CT scan showed pancreatic head cysts per radiologist recommendations I ordered an MRI to further evaluate.  MRI notes tiny pancreatic head cysts with no enhancement radiologist recommends follow-up in 2 years.    The following portions of the patient's history were reviewed and updated as appropriate: allergies, current medications, past family history, past medical history, past social history, past surgical history and problem list.    Review of Systems   Constitutional:  Negative for chills and fever.   Gastrointestinal:  Negative for abdominal pain, anal bleeding and blood in stool.   Genitourinary:  Positive for urgency. Negative for dysuria and hematuria.       Current Outpatient Medications:     apixaban (ELIQUIS) 2.5 MG tablet tablet, Take 1 tablet by mouth Every 12 (Twelve) Hours., Disp: 180 tablet, Rfl: 3    atorvastatin (LIPITOR) 40 MG tablet, Take 1 tablet by mouth Daily., Disp: , Rfl:     bimatoprost (LUMIGAN) 0.01 % ophthalmic drops, Administer 1 drop to the right eye Every Night., Disp: , Rfl:     carvedilol (COREG) 3.125 MG tablet, Take 1 tablet by mouth 2 (Two) Times a Day With Meals for 90 days., Disp: 180 tablet, Rfl: 0    Cholecalciferol (VITAMIN D3) 5000 units capsule capsule, Take 1 capsule by mouth Daily., Disp: , Rfl:     dapagliflozin Propanediol (Farxiga) 10 MG tablet, Take 10 mg by mouth Daily., Disp: 30 tablet, Rfl: 11    losartan (COZAAR) 25 MG tablet, Take 1 tablet by mouth Daily., Disp: 90 tablet, Rfl: 3    montelukast (SINGULAIR) 10 MG tablet, Take 1 tablet by mouth Every Night., Disp: , Rfl:     nitroglycerin (NITROSTAT) 0.4 MG SL tablet, 1 under the tongue as needed for angina, may repeat q5mins for up three doses,  "Disp: 25 tablet, Rfl: 1    nitrofurantoin (MACRODANTIN) 50 MG capsule, Take 1 capsule by mouth Every Night. (Patient not taking: Reported on 2023), Disp: 90 capsule, Rfl: 0    Past Medical History:   Diagnosis Date    Arthritis     Cataract     BILAT    Electrolyte imbalance     Hypertension     Vitamin D deficiency        Past Surgical History:   Procedure Laterality Date    ANTERIOR AND POSTERIOR VAGINAL REPAIR      APPENDECTOMY      CARDIAC CATHETERIZATION N/A 2022    Procedure: Left Heart Cath;  Surgeon: Michael Davis MD;  Location:  [facility];  Service: Cardiology;  Laterality: N/A;    COLONOSCOPY      EXCISION LESION      RIGHT SHOULDER ABCESSES    EYE SURGERY      HYSTERECTOMY      TOTAL HIP ARTHROPLASTY Right 2018    Procedure: RIGHT TOTAL HIP REPLACEMENT;  Surgeon: Waldemar Serrano MD;  Location:  [facility] OR;  Service: Orthopedics       Social History     Socioeconomic History    Marital status:    Tobacco Use    Smoking status: Former     Packs/day: 1.00     Years: 15.00     Pack years: 15.00     Types: Cigarettes     Quit date:      Years since quittin.7    Smokeless tobacco: Never   Vaping Use    Vaping Use: Never used   Substance and Sexual Activity    Alcohol use: No    Drug use: No    Sexual activity: Defer       Family History   Problem Relation Age of Onset    Diabetes Father     Heart disease Father     Aneurysm Father     Cancer Sister     Cancer Sister     Alzheimer's disease Sister     Cancer Brother     Heart disease Brother     Heart disease Brother     Diabetes Brother     Cancer Brother        Objective    Temp 97.5 °F (36.4 °C)   Ht 157.5 cm (62\")   Wt 55.1 kg (121 lb 9.3 oz)   BMI 22.24 kg/m²     Physical Exam  Vitals reviewed.   Constitutional:       Appearance: Normal appearance.   HENT:      Head: Normocephalic and atraumatic.   Pulmonary:      Effort: Pulmonary effort is normal.   Skin:     Coloration: Skin is not pale. "   Neurological:      Mental Status: She is alert.   Psychiatric:         Mood and Affect: Mood normal.         Behavior: Behavior normal.           Results for orders placed or performed in visit on 09/26/23   POC Urinalysis Dipstick, Multipro    Specimen: Urine   Result Value Ref Range    Color Yellow Yellow, Straw, Dark Yellow, Adina    Clarity, UA Clear Clear    Glucose, UA >=1000 mg/dL (3+) (A) Negative mg/dL    Bilirubin Negative Negative    Ketones, UA Negative Negative    Specific Gravity  1.005 1.005 - 1.030    Blood, UA Moderate (A) Negative    pH, Urine 5.5 5.0 - 8.0    Protein, POC Trace (A) Negative mg/dL    Urobilinogen, UA Normal Normal, 0.2 E.U./dL    Nitrite, UA Negative Negative    Leukocytes Trace (A) Negative       Assessment and Plan    Diagnoses and all orders for this visit:    1. Recurrent UTI (Primary)  -     POC Urinalysis Dipstick, Multipro    2. Incomplete bladder emptying    3. Pancreatic cyst    I reviewed the MRI radiologist described several pancreatic head cysts that did not enhance therefore did not have a solid looking appearance per radiologist recommendations it is recommended she have a 2-year follow-up with repeat MRI of the abdomen.    Continue self cathing 4 times daily for incomplete bladder emptying.  He has had no infection since last seen.  She can be seen in 1 year.

## 2023-09-26 ENCOUNTER — OFFICE VISIT (OUTPATIENT)
Dept: UROLOGY | Facility: CLINIC | Age: 83
End: 2023-09-26
Payer: MEDICARE

## 2023-09-26 VITALS — HEIGHT: 62 IN | TEMPERATURE: 97.5 F | BODY MASS INDEX: 22.37 KG/M2 | WEIGHT: 121.58 LBS

## 2023-09-26 DIAGNOSIS — K86.2 PANCREATIC CYST: ICD-10-CM

## 2023-09-26 DIAGNOSIS — R33.9 INCOMPLETE BLADDER EMPTYING: ICD-10-CM

## 2023-09-26 DIAGNOSIS — N39.0 RECURRENT UTI: Primary | ICD-10-CM

## 2023-09-26 LAB
BILIRUB BLD-MCNC: NEGATIVE MG/DL
CLARITY, POC: CLEAR
COLOR UR: YELLOW
GLUCOSE UR STRIP-MCNC: ABNORMAL MG/DL
KETONES UR QL: NEGATIVE
LEUKOCYTE EST, POC: ABNORMAL
NITRITE UR-MCNC: NEGATIVE MG/ML
PH UR: 5.5 [PH] (ref 5–8)
PROT UR STRIP-MCNC: ABNORMAL MG/DL
RBC # UR STRIP: ABNORMAL /UL
SP GR UR: 1 (ref 1–1.03)
UROBILINOGEN UR QL: NORMAL

## 2023-09-26 PROCEDURE — 1159F MED LIST DOCD IN RCRD: CPT | Performed by: PHYSICIAN ASSISTANT

## 2023-09-26 PROCEDURE — 1160F RVW MEDS BY RX/DR IN RCRD: CPT | Performed by: PHYSICIAN ASSISTANT

## 2023-09-26 PROCEDURE — 99214 OFFICE O/P EST MOD 30 MIN: CPT | Performed by: PHYSICIAN ASSISTANT

## 2023-09-26 PROCEDURE — 81001 URINALYSIS AUTO W/SCOPE: CPT | Performed by: PHYSICIAN ASSISTANT

## 2023-10-10 ENCOUNTER — OFFICE VISIT (OUTPATIENT)
Dept: CARDIOLOGY | Facility: CLINIC | Age: 83
End: 2023-10-10
Payer: MEDICARE

## 2023-10-10 VITALS
SYSTOLIC BLOOD PRESSURE: 128 MMHG | BODY MASS INDEX: 22.45 KG/M2 | DIASTOLIC BLOOD PRESSURE: 70 MMHG | OXYGEN SATURATION: 99 % | RESPIRATION RATE: 18 BRPM | HEIGHT: 62 IN | HEART RATE: 66 BPM | WEIGHT: 122 LBS

## 2023-10-10 VITALS
WEIGHT: 121 LBS | BODY MASS INDEX: 22.26 KG/M2 | HEART RATE: 71 BPM | OXYGEN SATURATION: 98 % | HEIGHT: 62 IN | SYSTOLIC BLOOD PRESSURE: 122 MMHG | DIASTOLIC BLOOD PRESSURE: 74 MMHG

## 2023-10-10 DIAGNOSIS — I48.19 ATRIAL FIBRILLATION, PERSISTENT: Chronic | ICD-10-CM

## 2023-10-10 DIAGNOSIS — I10 ESSENTIAL HYPERTENSION: ICD-10-CM

## 2023-10-10 DIAGNOSIS — I50.22 CHRONIC SYSTOLIC CHF (CONGESTIVE HEART FAILURE): ICD-10-CM

## 2023-10-10 DIAGNOSIS — I25.2 STATUS POST NON-ST ELEVATION MYOCARDIAL INFARCTION (NSTEMI): Primary | ICD-10-CM

## 2023-10-10 DIAGNOSIS — I48.92 ATRIAL FLUTTER WITH CONTROLLED RESPONSE: Primary | Chronic | ICD-10-CM

## 2023-10-10 DIAGNOSIS — I48.92 ATRIAL FLUTTER WITH CONTROLLED RESPONSE: Chronic | ICD-10-CM

## 2023-10-10 NOTE — PROGRESS NOTES
Subjective:     Encounter Date: 10/10/2023      Patient ID: Ashley Maher is a 82 y.o. female.    Chief Complaint: follow up NSTEMI, CHF, atrial flutter     History of Present Illness     The patient presents to follow up regarding her NSTEMI, afib, aflutter, and systolic CHF.     She was admitted 7/24/2022 and established care with Dr. Davis at that time. She reports the night prior she had a 2-3 hr episode of acute chest pain without radiation or other symptoms. Workup revealed type I NSTEMI with a peak troponin of 2.55 and evidence of some lateral Q waves and non specific T wave abnormalities on EKG. She was found to be positive for COVID-19, though did not appear to be symptomatic from that. She was also noted to be hyponatremic. Given her lack of any recurrent chest pain, hemodynamic stability and positive COVID -19 status, her NSTEMI was treated non invasively. She received anticoagulation with lovenox x 48 hr and was started on GDMT with ASA, plavix, high intensity statin, and BB. ACEI was transitioned to ARB at [facility] given her cough. Echo revealed an LVEF of 45%. She was discharged home in stable condition on 7/26 with recommendations per Dr. Davis for 1-2 week follow up, at which point further risk stratification with a nuclear stress test would be considered. She was not referred for cardiac rehab given the need for further ischemic evaluation as an outpatient.     I followed up with the patient in August 2022 and she was doing well.  She had completed physical therapy at home.  She was able to walk around her home in her yard with her dog without any chest discomfort or shortness of breath.  She used a walker for walking longer distances.  She was without complaint aside from some generalized weakness.  She was tolerating her medications.  At that visit, for further risk stratification I did order the Lexiscan.  This was high risk and that Dr. Davis subsequently proceeded with cardiac catheterization  "on 9/30/2022.  See full details in cath report-she was found to have near normal coronary arteries but did have an LVEF of 40% and evidence of elevated LVEDP and volume overload on exam that day.  Therefore, Dr. Davis added an SGLT2 inhibitor to her medical therapy and instructed her to continue all other medical therapy.  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    I followed up with the patient in mid January 2023 and she was feeling well.  She denied any chest discomfort, shortness of breath, orthopnea, PND.  She was unable to tell me what medications that she was taking at home.  After completing an accurate medication reconciliation, it was revealed she was not taking her losartan at home, so this was resumed.    I followed up with her on 4/25 and she was feeling well/about the same compared to last visit.  She reported issues with her balance and generalized weakness.  She stated she was able to walk up and down her driveway without any symptoms.  She denied any chest discomfort, shortness of breath, orthopnea, PND.  She was incidentally found to be in rate controlled atrial flutter that day.  This was a new diagnosis.  Plavix was discontinued and she was started on Eliquis 2.5 mg twice daily.  She was placed in a 14-day Holter monitor.  See results below.    She followed up on 5/23 and was doing well.  Her only complaint was feeling \"lazy and tired.\"  Cardioversion was ordered for persistent, rate controlled atrial flutter.    She was successfully cardioverted by Dr. Davis on 5/31 and placed in a 14-day Holter monitor.  See results below.    I followed up with her in early July and she reported she felt no different after cardioversion.  Her only complaint was \"feeling lazy.\"  She was not having any chest discomfort or shortness of breath.  She was in rate controlled atrial flutter that day and seemed to be " tolerating this pretty well.  After discussion with Dr. Davis, I did refer her to electrophysiology for the possibility of ablation.    The patient actually had a visit with Dr. Archuleta this morning.  See his note for full details.  Ultimately as she seems to be asymptomatic and is rate controlled, conservative approach was felt to be best for now and continued rate control is being pursued.  She states she is feeling well.  She admits some mild chronic fatigue.  She denies any palpitations, chest discomfort, shortness of breath, orthopnea, PND, rapid weight gain.  Blood pressure is well controlled.  She denies falls.  She denies bleeding.  She does some walking around her house and yard, but beyond that not exercise routinely.    The following portions of the patient's history were reviewed and updated as appropriate: allergies, current medications, past family history, past medical history, past social history, past surgical history and problem list.    Review of Systems   Constitutional: Positive for malaise/fatigue.   Cardiovascular:  Negative for chest pain, claudication, dyspnea on exertion, leg swelling, near-syncope, orthopnea, palpitations, paroxysmal nocturnal dyspnea and syncope.   Respiratory:  Negative for cough and shortness of breath.    Hematologic/Lymphatic: Does not bruise/bleed easily.   Musculoskeletal:  Negative for falls.   Gastrointestinal:  Negative for bloating.   Neurological:  Negative for dizziness, light-headedness and weakness.       Allergies   Allergen Reactions    Sulfa Antibiotics Other (See Comments)     EXTREMELY LOW BLOOD PRESSURE    Pyridium [Phenazopyridine Hcl] Swelling     RASH WITH SWELLING FACE    Tramadol Dizziness     STATES SHE COULDN'T THINK    Trimethoprim Unknown (See Comments)     RECEIVED FROM [facility], PT UNSURE OF REACTION    Paba Derivatives Rash     INGREDIENT IN SUNSCREENS       Current Outpatient Medications:     apixaban (ELIQUIS) 2.5 MG tablet tablet,  "Take 1 tablet by mouth Every 12 (Twelve) Hours., Disp: 180 tablet, Rfl: 3    atorvastatin (LIPITOR) 40 MG tablet, Take 1 tablet by mouth Daily., Disp: , Rfl:     bimatoprost (LUMIGAN) 0.01 % ophthalmic drops, Administer 1 drop to the right eye Every Night., Disp: , Rfl:     carvedilol (COREG) 3.125 MG tablet, Take 1 tablet by mouth 2 (Two) Times a Day With Meals for 90 days., Disp: 180 tablet, Rfl: 0    Cholecalciferol (VITAMIN D3) 5000 units capsule capsule, Take 1 capsule by mouth Daily., Disp: , Rfl:     dapagliflozin Propanediol (Farxiga) 10 MG tablet, Take 10 mg by mouth Daily., Disp: 30 tablet, Rfl: 11    losartan (COZAAR) 25 MG tablet, Take 1 tablet by mouth Daily., Disp: 90 tablet, Rfl: 3    montelukast (SINGULAIR) 10 MG tablet, Take 1 tablet by mouth Every Night., Disp: , Rfl:     nitroglycerin (NITROSTAT) 0.4 MG SL tablet, 1 under the tongue as needed for angina, may repeat q5mins for up three doses, Disp: 25 tablet, Rfl: 1         Objective:    /74   Pulse 71   Ht 157.5 cm (62.01\")   Wt 54.9 kg (121 lb)   SpO2 98%   BMI 22.13 kg/mý        Vitals and nursing note reviewed.   Constitutional:       General: Not in acute distress.     Appearance: Well-developed and not in distress. Not diaphoretic.   Neck:      Vascular: No JVD.   Pulmonary:      Effort: Pulmonary effort is normal. No respiratory distress.      Breath sounds: Normal breath sounds.   Cardiovascular:      Normal rate. Irregular rhythm.      Murmurs: There is no murmur.   Edema:     Peripheral edema absent.   Abdominal:      Tenderness: There is no abdominal tenderness.   Skin:     General: Skin is warm and dry.   Neurological:      Mental Status: Alert and oriented to person, place, and time.         Lab Review:   Lab Results   Component Value Date    GLUCOSE 99 01/17/2023    BUN 13 01/17/2023    CREATININE 0.80 03/24/2023    EGFRIFNONA 109 07/20/2018    BCR 20.3 01/17/2023    K 4.1 01/17/2023    CO2 26.0 01/17/2023    CALCIUM 8.8 " "01/17/2023    ALBUMIN 4.0 01/17/2023    AST 25 01/17/2023    ALT 18 01/17/2023     Lab Results   Component Value Date    CHOL 113 09/30/2022    TRIG 37 09/30/2022    HDL 58 09/30/2022    LDL 45 09/30/2022     Lab Results   Component Value Date    HGBA1C 5.60 07/24/2022     Lab Results   Component Value Date    WBC 4.65 09/30/2022    HGB 11.7 (L) 09/30/2022    HCT 35.3 09/30/2022    MCV 97.5 (H) 09/30/2022     09/30/2022     No results found for: \"TSH\"          Procedures  I reviewed the EKG performed earlier her visit with Dr. Archuleta today: Rate controlled atrial fibrillation with a heart rate of 66 bpm, right bundle branch block    Results for orders placed during the hospital encounter of 07/24/22    Adult Transthoracic Echo Complete W/ Cont if Necessary Per Protocol    Interpretation Summary  ú Calculated left ventricular 3D EF = 45% Estimated left ventricular EF = 45% Left ventricular systolic function is mildly decreased.  ú The following left ventricular wall segments are hypokinetic: mid anterior, apical anterior, mid anterolateral, apical lateral and mid inferolateral.  ú Left ventricular diastolic function is consistent with (grade II w/high LAP) pseudonormalization.  ú Estimated right ventricular systolic pressure from tricuspid regurgitation is mildly elevated (35-45 mmHg).  ú Normal size and function of the right ventricle.  ú No significant (greater than mild) valvular pathology.    9/30/2022 cath report:    Impression:  1.  Near-normal coronary arteries  2.  LVEF 40% with lateral wall akinesis, not explained by any obstructive coronary disease.  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  Elevated LVEDP     Plan:   1.  2 hours bedrest, then can be discharged home  2.  Continue aspirin 81mg daily indefinitely, and all other current medical therapies including " statin, beta-blocker, ARB  3.  In light of reduced EF with elevated LVEDP, as well as bibasilar rales on exam before the procedure, will add SGLT2 inhibitor for better CHF treatment  4.  Follow-up with Anjali Alcaraz in 3 months        Michael Davis MD    4/2023 14 day holter:      An abnormal monitor study.    Predominant rhythm was atrial flutter.    100% burden of atrial flutter, with average ventricular rate 88 bpm (range ).    Patient never triggered the device or reported any symptoms.     14 day holter worn after successful cardioversion on 5/31/2023:     A relatively benign monitor study.    Predominant rhythm was normal sinus rhythm.    No sustained arrhythmias noted.    Patient never triggered the device or reported any symptoms.    Occasional (1.7%) isolated PACs.    Numerous episodes of asymptomatic supraventricular tachycardia (SVT) noted, the longest of which was 15 beats at an average rate of 104 bpm.  These are common and benign findings.     Assessment:      Problem List Items Addressed This Visit          Cardiac and Vasculature    Atrial flutter with controlled response (Chronic)    Atrial fibrillation, persistent (Chronic)    Status post non-ST elevation myocardial infarction (NSTEMI) - Primary    Overview     Type I NSTEMI 7/24/2022 - medically managed given positive COVID 19 status, without recurrent chest pain following initial event.     Given the non-STEMI, Lexiscan was ordered for further risk stratification and this was high risk and she subsequently underwent cardiac catheterization on 9/30 and this revealed near normal coronary arteries.  It is felt the regional wall motion abnormalities were potentially due to COVID myocarditis at the time of her non-STEMI.         Essential hypertension    Chronic systolic CHF (congestive heart failure)    Overview     LVEF 40-45% with elevated LVEDP on [facility] 9/30/22          Plan:     1.  Persistent atrial fibrillation and rate controlled atrial  flutter: Atrial flutter incidentally noted on EKG 4/25/23-new diagnosis at that time.  She was placed in a 14-day Holter monitor at that time, which revealed persistent rate controlled atrial flutter.  Her [id number] is 6.  She was successfully cardioverted on 5/31/23.  She maintained normal sinus rhythm for the duration of the 14-day Holter monitor that she wore thereafter.  However, 7/6 she was back in rate controlled atrial flutter.  She was referred to Dr. Archuleta and saw him today.  It appears that she is in rate controlled atrial fibrillation today.  It seems that she continues to be relatively asymptomatic.  She does have some fatigue which may or may not be related.  We will continue rate control.    -Continue Eliquis 2.5 mg twice daily  -Continue current dose of Coreg for rate control  -Follow-up with EP in 3 months as planned.    2. NSTEMI : Stable. No recurrent CP since NSTEMI occurred 7/23-7/24.  She was subsequently found to have near normal coronary arteries per cardiac catheterization 9/30/2022 after Lexiscan for risk stratification was high risk.  It is suspected the etiology for lateral akinesis may be COVID myocarditis (had COVID at time of NSTEMI)    -Continue high intensity statin, beta-blocker, as needed sublingual nitroglycerin  -No longer on antiplatelet medication given need for anticoagulation and no prior PCI.    3. Hypertension: Well controlled.  Continue losartan and Coreg.    4. Hyperlipidemia: LDL well controlled at 45 continue atorvastatin 40, with goal LDL less than 70.    5.  Chronic systolic CHF: She appears compensated/euvolemic on exam today and is denying dyspnea, rapid weight gain, orthopnea, PND.  Continue Coreg, losartan and Farxiga. We reviewed signs and symptoms consistent with acute CHF and what to report.  Weigh daily.  Heart healthy low-sodium diet.    Follow up 6 months with Dr. Davis, call sooner with new or worsening symptoms    I spent 34 minutes caring for Ashley on  this date of service. This time includes time spent by me in the following activities: preparing for the visit, reviewing tests, obtaining and/or reviewing a separately obtained history, performing a medically appropriate examination and/or evaluation, counseling and educating the patient/family/caregiver, ordering medications, tests, or procedures and documenting information in the medical record

## 2024-02-13 RX ORDER — LOSARTAN POTASSIUM 25 MG/1
25 TABLET ORAL DAILY
Qty: 90 TABLET | Refills: 3 | Status: SHIPPED | OUTPATIENT
Start: 2024-02-13

## 2024-08-12 ENCOUNTER — TELEPHONE (OUTPATIENT)
Dept: CARDIOLOGY | Facility: CLINIC | Age: 84
End: 2024-08-12

## 2024-08-12 NOTE — TELEPHONE ENCOUNTER
Caller: Nikki Maher    Relationship to patient: Emergency Contact    Best call back number: [phone]      Type of visit: FOLLOW UP     Requested date: PATIENT NEEDS SEPTEMBER IN BETWEEN 10 AM TO 1 PM.           Additional notes:PATIENT HAD TO CANCEL APPOINTMENT ON 1/11/24 AND NEEDS TO RESCHEDULE. ALL OF THE DAYS THE PROVIDER WAS AVAILABLE DIDN'T WORK FOR THE PATIENT.  PLEASE CALL TO MAKE APPOINTMENT THAT WILL WORK .

## 2024-09-06 RX ORDER — APIXABAN 2.5 MG/1
2.5 TABLET, FILM COATED ORAL EVERY 12 HOURS
Qty: 180 TABLET | Refills: 3 | Status: SHIPPED | OUTPATIENT
Start: 2024-09-06

## 2024-09-13 NOTE — PROGRESS NOTES
Subjective    Ms. Maher is 83 y.o. female    Chief Complaint: Recurrent UTI    History of Present Illness  Patient is an 83-year-old female presents for annual follow-up she has a history of bladder emptying along with recurrent UTIs it was felt her current current UTIs were related to incomplete bladder emptying.  She performs self catheterization 4 times daily.  Is on no urologic medications.  She has not had any documented UTI since last seen.  She is not symptomatic today.  Not able to provide urine specimen    The following portions of the patient's history were reviewed and updated as appropriate: allergies, current medications, past family history, past medical history, past social history, past surgical history and problem list.    Review of Systems   Constitutional: Negative.    Genitourinary:  Positive for difficulty urinating.         Current Outpatient Medications:     apixaban (Eliquis) 2.5 MG tablet tablet, TAKE 1 TABLET BY MOUTH EVERY 12 HOURS, Disp: 180 tablet, Rfl: 3    atorvastatin (LIPITOR) 40 MG tablet, Take 1 tablet by mouth Daily., Disp: , Rfl:     bimatoprost (LUMIGAN) 0.01 % ophthalmic drops, Administer 1 drop to the right eye Every Night., Disp: , Rfl:     Cholecalciferol (VITAMIN D3) 5000 units capsule capsule, Take 1 capsule by mouth Daily., Disp: , Rfl:     dapagliflozin Propanediol (Farxiga) 10 MG tablet, Take 10 mg by mouth Daily., Disp: 30 tablet, Rfl: 11    losartan (COZAAR) 25 MG tablet, TAKE 1 TABLET BY MOUTH ONCE A DAY, Disp: 90 tablet, Rfl: 3    montelukast (SINGULAIR) 10 MG tablet, Take 1 tablet by mouth Every Night., Disp: , Rfl:     nitroglycerin (NITROSTAT) 0.4 MG SL tablet, 1 under the tongue as needed for angina, may repeat q5mins for up three doses, Disp: 25 tablet, Rfl: 1    carvedilol (COREG) 3.125 MG tablet, Take 1 tablet by mouth 2 (Two) Times a Day With Meals for 90 days., Disp: 180 tablet, Rfl: 0    Past Medical History:   Diagnosis Date    Arthritis     Cataract      "BILAT    Electrolyte imbalance     Hypertension     Vitamin D deficiency        Past Surgical History:   Procedure Laterality Date    ANTERIOR AND POSTERIOR VAGINAL REPAIR      APPENDECTOMY      CARDIAC CATHETERIZATION N/A 2022    Procedure: Left Heart Cath;  Surgeon: Michael Davis MD;  Location:  [facility]ATH [facility];  Service: Cardiology;  Laterality: N/A;    COLONOSCOPY      EXCISION LESION      RIGHT SHOULDER ABCESSES    EYE SURGERY      HYSTERECTOMY      TOTAL HIP ARTHROPLASTY Right 2018    Procedure: RIGHT TOTAL HIP REPLACEMENT;  Surgeon: Waldemar Serrano MD;  Location:  [facility] OR;  Service: Orthopedics       Social History     Socioeconomic History    Marital status:    Tobacco Use    Smoking status: Former     Current packs/day: 0.00     Average packs/day: 1 pack/day for 15.0 years (15.0 ttl pk-yrs)     Types: Cigarettes     Start date:      Quit date:      Years since quittin.7    Smokeless tobacco: Never   Vaping Use    Vaping status: Never Used   Substance and Sexual Activity    Alcohol use: No    Drug use: No    Sexual activity: Defer       Family History   Problem Relation Age of Onset    Diabetes Father     Heart disease Father     Aneurysm Father     Cancer Sister     Cancer Sister     Alzheimer's disease Sister     Cancer Brother     Heart disease Brother     Heart disease Brother     Diabetes Brother     Cancer Brother        Objective    Temp 97.7 °F (36.5 °C)   Ht 157.5 cm (62.01\")   Wt 53.1 kg (117 lb)   BMI 21.39 kg/m²     Physical Exam  Vitals reviewed.   Constitutional:       Appearance: Normal appearance.   HENT:      Head: Normocephalic and atraumatic.   Pulmonary:      Effort: Pulmonary effort is normal.   Skin:     Coloration: Skin is not pale.   Neurological:      Mental Status: She is alert.   Psychiatric:         Mood and Affect: Mood normal.         Behavior: Behavior normal.               Assessment and Plan    Diagnoses and all orders for " this visit:    1. Recurrent UTI (Primary)  -     POC Urinalysis Dipstick, Multipro    2. Incomplete bladder emptying      Patient will continue intermittent catheterization 4 times daily as recommended.  Patient is having no difficulties getting catheter supplies.  No refills on supplies needed today.    Patient not had any documented UTI since last seen.  Not symptomatic today she was not able to provide urine specimen today.    Commend follow-up in 6 months.

## 2024-09-27 ENCOUNTER — OFFICE VISIT (OUTPATIENT)
Dept: CARDIOLOGY | Facility: CLINIC | Age: 84
End: 2024-09-27
Payer: MEDICARE

## 2024-09-27 VITALS
BODY MASS INDEX: 21.71 KG/M2 | SYSTOLIC BLOOD PRESSURE: 126 MMHG | HEART RATE: 76 BPM | DIASTOLIC BLOOD PRESSURE: 82 MMHG | OXYGEN SATURATION: 97 % | HEIGHT: 62 IN | WEIGHT: 118 LBS

## 2024-09-27 DIAGNOSIS — I50.22 CHRONIC SYSTOLIC CHF (CONGESTIVE HEART FAILURE): ICD-10-CM

## 2024-09-27 DIAGNOSIS — Z79.01 CHRONIC ANTICOAGULATION: Chronic | ICD-10-CM

## 2024-09-27 DIAGNOSIS — I48.19 ATRIAL FIBRILLATION, PERSISTENT: Chronic | ICD-10-CM

## 2024-09-27 DIAGNOSIS — I48.92 ATRIAL FLUTTER WITH CONTROLLED RESPONSE: Primary | ICD-10-CM

## 2024-10-01 ENCOUNTER — OFFICE VISIT (OUTPATIENT)
Dept: UROLOGY | Facility: CLINIC | Age: 84
End: 2024-10-01
Payer: MEDICARE

## 2024-10-01 ENCOUNTER — OFFICE VISIT (OUTPATIENT)
Dept: CARDIOLOGY | Facility: CLINIC | Age: 84
End: 2024-10-01
Payer: MEDICARE

## 2024-10-01 VITALS
DIASTOLIC BLOOD PRESSURE: 90 MMHG | HEIGHT: 62 IN | HEART RATE: 59 BPM | OXYGEN SATURATION: 100 % | BODY MASS INDEX: 21.71 KG/M2 | SYSTOLIC BLOOD PRESSURE: 122 MMHG | WEIGHT: 118 LBS

## 2024-10-01 VITALS — TEMPERATURE: 97.7 F | HEIGHT: 62 IN | WEIGHT: 117 LBS | BODY MASS INDEX: 21.53 KG/M2

## 2024-10-01 DIAGNOSIS — I25.2 STATUS POST NON-ST ELEVATION MYOCARDIAL INFARCTION (NSTEMI): ICD-10-CM

## 2024-10-01 DIAGNOSIS — I48.92 ATRIAL FLUTTER WITH CONTROLLED RESPONSE: Chronic | ICD-10-CM

## 2024-10-01 DIAGNOSIS — I50.22 CHRONIC SYSTOLIC CHF (CONGESTIVE HEART FAILURE): ICD-10-CM

## 2024-10-01 DIAGNOSIS — N39.0 RECURRENT UTI: Primary | ICD-10-CM

## 2024-10-01 DIAGNOSIS — I48.19 ATRIAL FIBRILLATION, PERSISTENT: Primary | Chronic | ICD-10-CM

## 2024-10-01 DIAGNOSIS — I10 ESSENTIAL HYPERTENSION: ICD-10-CM

## 2024-10-01 DIAGNOSIS — R33.9 INCOMPLETE BLADDER EMPTYING: ICD-10-CM

## 2024-10-01 PROCEDURE — 1160F RVW MEDS BY RX/DR IN RCRD: CPT | Performed by: PHYSICIAN ASSISTANT

## 2024-10-01 PROCEDURE — 1159F MED LIST DOCD IN RCRD: CPT | Performed by: PHYSICIAN ASSISTANT

## 2024-10-01 PROCEDURE — 99214 OFFICE O/P EST MOD 30 MIN: CPT | Performed by: PHYSICIAN ASSISTANT

## 2024-10-01 NOTE — PROGRESS NOTES
Subjective:     Encounter Date: 10/1/2024      Patient ID: Ashley Maher is a 83 y.o. female.    Chief Complaint: follow up NSTEMI, CHF, atrial flutter     History of Present Illness     The patient presents to follow up regarding her NSTEMI, afib, aflutter, and systolic CHF.     She was admitted 7/24/2022 and established care with Dr. Davis at that time. She reports the night prior she had a 2-3 hr episode of acute chest pain without radiation or other symptoms. Workup revealed type I NSTEMI with a peak troponin of 2.55 and evidence of some lateral Q waves and non specific T wave abnormalities on EKG. She was found to be positive for COVID-19, though did not appear to be symptomatic from that. She was also noted to be hyponatremic. Given her lack of any recurrent chest pain, hemodynamic stability and positive COVID -19 status, her NSTEMI was treated non invasively. She received anticoagulation with lovenox x 48 hr and was started on GDMT with ASA, plavix, high intensity statin, and BB. ACEI was transitioned to ARB at [place] given her cough. Echo revealed an LVEF of 45%. She was discharged home in stable condition on 7/26 with recommendations per Dr. Davis for 1-2 week follow up, at which point further risk stratification with a nuclear stress test would be considered. She was not referred for cardiac rehab given the need for further ischemic evaluation as an outpatient.     I followed up with the patient in August 2022 and she was doing well.  She had completed physical therapy at home.  She was able to walk around her home in her yard with her dog without any chest discomfort or shortness of breath.  She used a walker for walking longer distances.  She was without complaint aside from some generalized weakness.  She was tolerating her medications.  At that visit, for further risk stratification I did order the Lexiscan.  This was high risk and that Dr. Davis subsequently proceeded with cardiac catheterization on  "9/30/2022.  See full details in cath report-she was found to have near normal coronary arteries but did have an LVEF of 40% and evidence of elevated LVEDP and volume overload on exam that day.  Therefore, Dr. Davis added an SGLT2 inhibitor to her medical therapy and instructed her to continue all other medical therapy.  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    I followed up with the patient in mid January 2023 and she was feeling well.  She denied any chest discomfort, shortness of breath, orthopnea, PND.  She was unable to tell me what medications that she was taking at home.  After completing an accurate medication reconciliation, it was revealed she was not taking her losartan at home, so this was resumed.    I followed up with her on 4/25 and she was feeling well/about the same compared to last visit.  She reported issues with her balance and generalized weakness.  She stated she was able to walk up and down her driveway without any symptoms.  She denied any chest discomfort, shortness of breath, orthopnea, PND.  She was incidentally found to be in rate controlled atrial flutter that day.  This was a new diagnosis.  Plavix was discontinued and she was started on Eliquis 2.5 mg twice daily.  She was placed in a 14-day Holter monitor.  See results below.    She followed up on 5/23 and was doing well.  Her only complaint was feeling \"lazy and tired.\"  Cardioversion was ordered for persistent, rate controlled atrial flutter.    She was successfully cardioverted by Dr. Davis on 5/31 and placed in a 14-day Holter monitor.  See results below.    I followed up with her in early July and she reported she felt no different after cardioversion.  Her only complaint was \"feeling lazy.\"  She was not having any chest discomfort or shortness of breath.  She was in rate controlled atrial flutter that day and seemed to be " tolerating this pretty well.  After discussion with Dr. Davis, I did refer her to electrophysiology for the possibility of ablation.    I saw the patient October 2023.  She saw EP that same morning.  Ultimately, she remained in rate controlled atrial fibrillation and was felt to be asymptomatic, therefore conservative approach was pursued with rate control.  She was doing well and reported some mild fatigue but otherwise had no complaints.    Today she presents for follow-up and continues to feel well.  She reports chronic fatigue.  When questioned about any shortness of breath she states sometimes she has congestion from allergies but not really any shortness of breath.  She denies chest pain, edema, rapid weight gain, orthopnea, PND, syncope or presyncope.  Blood pressure is well-controlled.  She is compliant with her medications.  She did have a fall since her last visit.  She ambulates with a cane.      The following portions of the patient's history were reviewed and updated as appropriate: allergies, current medications, past family history, past medical history, past social history, past surgical history and problem list.    Review of Systems   Constitutional: Positive for malaise/fatigue.   Cardiovascular:  Negative for chest pain, claudication, dyspnea on exertion, leg swelling, near-syncope, orthopnea, palpitations, paroxysmal nocturnal dyspnea and syncope.   Respiratory:  Negative for cough and shortness of breath.    Hematologic/Lymphatic: Does not bruise/bleed easily.   Musculoskeletal:  Positive for falls.   Gastrointestinal:  Negative for bloating.   Neurological:  Negative for dizziness, light-headedness and weakness.       Allergies   Allergen Reactions    Sulfa Antibiotics Other (See Comments)     EXTREMELY LOW BLOOD PRESSURE    Pyridium [Phenazopyridine Hcl] Swelling     RASH WITH SWELLING FACE    Tramadol Dizziness     STATES SHE COULDN'T THINK    Trimethoprim Unknown (See Comments)     RECEIVED  "FROM [facility], PT UNSURE OF REACTION    Paba Derivatives Rash     INGREDIENT IN SUNSCREENS       Current Outpatient Medications:     apixaban (Eliquis) 2.5 MG tablet tablet, TAKE 1 TABLET BY MOUTH EVERY 12 HOURS, Disp: 180 tablet, Rfl: 3    atorvastatin (LIPITOR) 40 MG tablet, Take 1 tablet by mouth Daily., Disp: , Rfl:     bimatoprost (LUMIGAN) 0.01 % ophthalmic drops, Administer 1 drop to the right eye Every Night., Disp: , Rfl:     carvedilol (COREG) 3.125 MG tablet, Take 1 tablet by mouth 2 (Two) Times a Day With Meals for 90 days., Disp: 180 tablet, Rfl: 0    Cholecalciferol (VITAMIN D3) 5000 units capsule capsule, Take 1 capsule by mouth Daily., Disp: , Rfl:     dapagliflozin Propanediol (Farxiga) 10 MG tablet, Take 10 mg by mouth Daily., Disp: 30 tablet, Rfl: 11    losartan (COZAAR) 25 MG tablet, TAKE 1 TABLET BY MOUTH ONCE A DAY, Disp: 90 tablet, Rfl: 3    montelukast (SINGULAIR) 10 MG tablet, Take 1 tablet by mouth Every Night., Disp: , Rfl:     nitroglycerin (NITROSTAT) 0.4 MG SL tablet, 1 under the tongue as needed for angina, may repeat q5mins for up three doses, Disp: 25 tablet, Rfl: 1         Objective:    /90   Pulse 59   Ht 157.5 cm (62.01\")   Wt 53.5 kg (118 lb)   SpO2 100%   BMI 21.58 kg/m²        Vitals and nursing note reviewed.   Constitutional:       General: Not in acute distress.     Appearance: Well-developed and not in distress. Frail. Not diaphoretic.   Neck:      Vascular: No JVD.   Pulmonary:      Effort: Pulmonary effort is normal. No respiratory distress.      Breath sounds: Normal breath sounds.   Cardiovascular:      Normal rate. Irregular rhythm.      Murmurs: There is no murmur.   Edema:     Peripheral edema absent.   Abdominal:      Tenderness: There is no abdominal tenderness.   Skin:     General: Skin is warm and dry.   Neurological:      Mental Status: Alert and oriented to person, place, and time.         Lab Review:   Lab Results   Component Value Date    " "GLUCOSE 99 01/17/2023    BUN 13 01/17/2023    CREATININE 0.80 03/24/2023    EGFRIFNONA 109 07/20/2018    BCR 20.3 01/17/2023    K 4.1 01/17/2023    CO2 26.0 01/17/2023    CALCIUM 8.8 01/17/2023    ALBUMIN 4.0 01/17/2023    AST 25 01/17/2023    ALT 18 01/17/2023     Lab Results   Component Value Date    CHOL 113 09/30/2022    TRIG 37 09/30/2022    HDL 58 09/30/2022    LDL 45 09/30/2022     Lab Results   Component Value Date    HGBA1C 5.60 07/24/2022     Lab Results   Component Value Date    WBC 4.65 09/30/2022    HGB 11.7 (L) 09/30/2022    HCT 35.3 09/30/2022    MCV 97.5 (H) 09/30/2022     09/30/2022     No results found for: \"TSH\"            ECG 12 Lead    Date/Time: 10/1/2024 1:58 PM  Performed by: Anjali Avendano APRN    Authorized by: Anjali Avendano APRN  Comparison: compared with previous ECG from 10/10/2023  Similar to previous ECG  Rhythm: atrial fibrillation  BPM: 59    Clinical impression: abnormal EKG            Results for orders placed during the hospital encounter of 07/24/22    Adult Transthoracic Echo Complete W/ Cont if Necessary Per Protocol    Interpretation Summary  · Calculated left ventricular 3D EF = 45% Estimated left ventricular EF = 45% Left ventricular systolic function is mildly decreased.  · The following left ventricular wall segments are hypokinetic: mid anterior, apical anterior, mid anterolateral, apical lateral and mid inferolateral.  · Left ventricular diastolic function is consistent with (grade II w/high LAP) pseudonormalization.  · Estimated right ventricular systolic pressure from tricuspid regurgitation is mildly elevated (35-45 mmHg).  · Normal size and function of the right ventricle.  · No significant (greater than mild) valvular pathology.    9/30/2022 cath report:    Impression:  1.  Near-normal coronary arteries  2.  LVEF 40% with lateral wall akinesis, not explained by any obstructive coronary disease.  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  Elevated LVEDP     Plan:   1.  2 hours bedrest, then can be discharged home  2.  Continue aspirin 81mg daily indefinitely, and all other current medical therapies including statin, beta-blocker, ARB  3.  In light of reduced EF with elevated LVEDP, as well as bibasilar rales on exam before the procedure, will add SGLT2 inhibitor for better CHF treatment  4.  Follow-up with Anjali Aclaraz in 3 months        Michael Davis MD    4/2023 14 day holter:      An abnormal monitor study.    Predominant rhythm was atrial flutter.    100% burden of atrial flutter, with average ventricular rate 88 bpm (range ).    Patient never triggered the device or reported any symptoms.     14 day holter worn after successful cardioversion on 5/31/2023:     A relatively benign monitor study.    Predominant rhythm was normal sinus rhythm.    No sustained arrhythmias noted.    Patient never triggered the device or reported any symptoms.    Occasional (1.7%) isolated PACs.    Numerous episodes of asymptomatic supraventricular tachycardia (SVT) noted, the longest of which was 15 beats at an average rate of 104 bpm.  These are common and benign findings.     Assessment:      Problem List Items Addressed This Visit          Cardiac and Vasculature    Atrial flutter with controlled response (Chronic)    Atrial fibrillation, persistent - Primary (Chronic)    Status post non-ST elevation myocardial infarction (NSTEMI)    Overview     Type I NSTEMI 7/24/2022 - medically managed given positive COVID 19 status, without recurrent chest pain following initial event.     Given the non-STEMI, Lexiscan was ordered for further risk stratification and this was high risk and she subsequently underwent cardiac catheterization on 9/30 and this revealed near normal coronary arteries.  It is felt the regional wall motion abnormalities were  potentially due to COVID myocarditis at the time of her non-STEMI.         Essential hypertension    Chronic systolic CHF (congestive heart failure)    Overview     LVEF 40-45% with elevated LVEDP on [facility] 9/30/22          Plan:     1.  Permanent atrial fibrillation/flutter: Rate controlled and asymptomatic.    -Continue Eliquis 2.5 mg twice daily  -Continue current dose of Coreg for rate control  -Given her fall, we did discuss Watchman device implantation as an alternative to stroke prophylaxis.  I provided the patient and the adult male with her today educational materials.  They state they will call back if they are interested in pursuing this further/would like a structural visit with Dr. Davis.    2. NSTEMI : Stable. No recurrent CP since NSTEMI occurred 7/23-7/24/22.  She was subsequently found to have near normal coronary arteries per cardiac catheterization 9/30/2022 after Lexiscan for risk stratification was high risk.  It is suspected the etiology for lateral akinesis may be COVID myocarditis (had COVID at time of NSTEMI)    -Continue high intensity statin, beta-blocker, as needed sublingual nitroglycerin  -No longer on antiplatelet medication given need for anticoagulation and no prior PCI.    3. Hypertension: Well controlled.  Continue losartan and Coreg.    4. Hyperlipidemia: LDL well controlled at 45 continue atorvastatin 40, with goal LDL less than 70.    5.  Chronic systolic CHF: She appears compensated/euvolemic on exam today and is denying dyspnea, rapid weight gain, orthopnea, PND.  Continue Coreg, losartan and Farxiga. We reviewed signs and symptoms consistent with acute CHF and what to report.  Weigh daily.  Heart healthy low-sodium diet.    Follow up 6 months with Dr. Davis, call sooner with new or worsening symptoms

## 2024-12-17 NOTE — ADDENDUM NOTE
Addendum  created 05/31/23 1641 by Senthil Tolbert, CRNA    Review and Sign - Ready for Procedure       Diagnosis: AML  Regimen: Azacytadine  Cycle/Day: 15/1  Is this a C1D1 appt?  No    Dr. Daly is supervising clinician today.    ECOG:   ECOG [12/17/24 0752]   ECOG Performance Status 0       Review and verified Advanced Directives: No: Patient declined to create/provide document at this time     Verified if patient has state DNR bracelet on: No; Full Code    Nursing Assessment:   A focused nursing assessment addressing the toxicity of chemotherapy was performed and the patient reports the following:      Toxicity Assessment    Auditory/Ear  Assessment: Yes (w/ Exceptions to WDL)  Hearing Impaired: Grade 2 - see row information    Cardiac General  Assessment: Yes (Within Defined Limits)    Constitutional  Assessment: Yes (w/ Exceptions to WDL)  Fatigue: Grade 1    Dermatology/Skin  Assessment: Yes (Within Defined Limits)    Endocrine  Assessment: Yes (Within Defined Limits)    Gastrointestinal  Assessment: Yes (Within Defined Limits)    Hemorrhage/Bleeding  Assessment: Yes (Within Defined Limits)    Infection  Assessment: Yes (Within Defined Limits)    Lymphatics  Assessment: Yes (Within Defined Limits)    Musculoskeletal  Assessment: Yes (Within Defined Limits)    Neurology  Assessment: Yes (Within Defined Limits)    Ocular  Assessment: Yes (Within Defined Limits)    Pain  Assessment: Yes (Within Defined Limits)    Pulmonary/Upper Respiratory  Assessment: Yes (w/ Exceptions to WDL)  Dyspnea: Grade 1    Genitourinary  Assessment: Yes (Within Defined Limits)        Patient confirms receipt of a signed copy of Anti-Cancer Treatment consent and verbalizes understanding of treatment plan: Yes.     Pre-Treatment: Treatment consent signed  Patient has valid pre-authorization  VS completed  Height and weight verified  BSA independently double checked & verified by two practitioners  Premed orders, including hydration, are verified prior to administration  Treatment parameters verified in patient protocol  Lab results  reviewed: Treatment conditions met, ok to proceed with treatment   Chemotherapy doses independently doubled checked & verified by two practitioners    Treatment: I have reviewed the following with the patient:  Name of chemo drug, duration and route of infusion, and reportable infusion-related symptoms.  Chemotherapy has not ; double checked & verified by two practitioners  Appearance and physical integrity of drugs meets standard of drug monograph; double checked & verified by two practitioners  Rate set on infusion pump is in alignment with ordered rate; double checked & verified by two practitioners  Blood return confirmed before, during and after treatment administered  Infusion pump used for non-vesicant drugs  Drugs were administered in proper sequencing  Refer to LDA and MAR for line assessment and medication administration    Post Treatment: Treatment tolerated well; no adverse reaction    Transfusion: Not needed    Integrative Medicine: No    Oral Chemotherapy: Yes, Patient is taking medication as prescribed.    Education: No new instructions needed    Next appointment scheduled: 2024    Patient instructed to call the office with any questions or concerns.    Patient Discharged: patient discharged to home per self, ambulatory  Vaccine Information Statement(s) given prior to administration.    This has been reviewed, questions answered, and verbal consent given by Patient for injection(s) and administration of Influenza (Inactivated).    1.  Does the patient have a moderate to severe fever?  No  2.  Has the patient had a serious reaction to a flu shot before?   No  3.  Has the patient ever had Guillian [place] Syndrome within 6 weeks of a previous flu shot?  No    A \"YES\" answer to any question above means the patient may not be eligible to receive the vaccine.    Note: Any person with a severe egg allergy (i.e. anaphylaxis reaction) should receive the flu vaccine at the providers office. Hives  are not the same as anaphylaxis.    Refer to MAR for type of injection and medication given.  See immunization grid for immunization documentation.    Pre-Injection Assessment: Allergies reviewed as required for injection type., Pt states feeling well, no complaints., and Pt denies signs and symptoms of infection.  Needle Size: 25 g. 5/8\"  Patient tolerated well:Stable    Dr. Daly is supervising physician today.    Report all adverse reactions to the influenza vaccine to the federal Vaccine Adverse Event Reporting System (VAERS) at www.vaers.hhs.gov or (654) 477-3007. VAERS report forms are available at www.vaers.hhs.gov.      Follow-up Disposition:     Check-out Comments: roceede with treatment today:azacytadine 37.5 mg/m2 and venetoclax 200 mg po q day 3 weeks on and 1 week off. Message sent to RN pool  Given see MAR    -schedule day 2,day3,day4,day 5 ,day6 and day 7 of treatment  12/18 - 8:00  12/19 - 8:00  12/20 - 8:10  12/23 - 8:20  12/24 - 8:30  12/26 - 8:00    -follow fl4ausl with cbc,cmp ldh with md for next cycle 14of azacytadine 37.5 mg/m2 and venetoclax 200 mg po q day 3 weeks on and 1  Appt 1/14/24  Lab 8:05 labs ordered   8:45  [place] 9:00    1/15 8:30  1/16 8:00  1/17 8:00  1/20 8:00  1/21 8:00  1/22 8:10  -contiue levofloxacin, fluconazole and acyclovir patient instructed

## 2025-01-08 NOTE — NURSING NOTE
PT. STATES FELL YESTERDAY. HAS SKIN ABRASION LEFT ARM WITH BANDAGE AND BRUISING. DTR. STATES WAS EVALUATED AND NO HEAD INJURY OR FRACTURES.   unable to assess

## 2025-01-30 ENCOUNTER — OFFICE VISIT (OUTPATIENT)
Age: 85
End: 2025-01-30
Payer: COMMERCIAL

## 2025-01-30 VITALS — HEIGHT: 62 IN | WEIGHT: 123 LBS | BODY MASS INDEX: 22.63 KG/M2

## 2025-01-30 DIAGNOSIS — M16.12 PRIMARY OSTEOARTHRITIS OF LEFT HIP: Primary | ICD-10-CM

## 2025-01-30 DIAGNOSIS — M25.552 LEFT HIP PAIN: ICD-10-CM

## 2025-01-30 PROCEDURE — 1123F ACP DISCUSS/DSCN MKR DOCD: CPT | Performed by: PHYSICIAN ASSISTANT

## 2025-01-30 PROCEDURE — 99214 OFFICE O/P EST MOD 30 MIN: CPT | Performed by: PHYSICIAN ASSISTANT

## 2025-01-30 PROCEDURE — 1159F MED LIST DOCD IN RCRD: CPT | Performed by: PHYSICIAN ASSISTANT

## 2025-01-30 RX ORDER — LOSARTAN POTASSIUM 25 MG/1
1 TABLET ORAL DAILY
COMMUNITY
Start: 2024-02-13

## 2025-01-30 RX ORDER — DAPAGLIFLOZIN 10 MG/1
TABLET, FILM COATED ORAL
COMMUNITY
Start: 2024-11-21

## 2025-01-30 RX ORDER — CARVEDILOL 3.12 MG/1
TABLET ORAL
COMMUNITY
Start: 2025-01-09

## 2025-01-30 RX ORDER — APIXABAN 2.5 MG/1
1 TABLET, FILM COATED ORAL EVERY 12 HOURS
COMMUNITY
Start: 2024-09-06

## 2025-01-30 RX ORDER — ATORVASTATIN CALCIUM 40 MG/1
1 TABLET, FILM COATED ORAL DAILY
COMMUNITY

## 2025-01-30 ASSESSMENT — ENCOUNTER SYMPTOMS
COLOR CHANGE: 0
BACK PAIN: 0

## 2025-01-30 NOTE — PROGRESS NOTES
prescribed medications. All patient questions were answered. Patient voiced understanding of care plan.   Patient was given educational materials - see patient instructions below.         Electronically signed by MEREDITH Palacios on 1/30/2025 at 11:57 AM

## 2025-01-30 NOTE — ASSESSMENT & PLAN NOTE
AP pelvis and lateral hip x-rays were obtained today in office of the left hip and demonstrate severe bone-on-bone osteoarthritis of the left hip with joint space collapse.  There is no other acute osseous abnormality appreciated.    I discussed several different treatment options with the patient and ultimately she decided she would like to try an intra-articular injection.  We will see and send her to pain management for this and see her back after she has had this to see how it helps her discomfort.

## 2025-02-04 RX ORDER — LOSARTAN POTASSIUM 25 MG/1
25 TABLET ORAL DAILY
Qty: 90 TABLET | Refills: 3 | Status: SHIPPED | OUTPATIENT
Start: 2025-02-04

## 2025-03-28 NOTE — PROGRESS NOTES
Subjective    Ms. Maher is 84 y.o. female    Chief Complaint: Recurrent UTI    History of Present Illness  Patient with history of incomplete bladder emptying and recurrent UTIs she is here for 6-month follow-up she is states she has had no documented UTI since last seen.  She is currently asymptomatic. She is having no difficulties inserting the catheter and she catheterizes up to 4 times daily.    The following portions of the patient's history were reviewed and updated as appropriate: allergies, current medications, past family history, past medical history, past social history, past surgical history and problem list.    Review of Systems   Genitourinary: Negative.          Current Outpatient Medications:     apixaban (Eliquis) 2.5 MG tablet tablet, TAKE 1 TABLET BY MOUTH EVERY 12 HOURS, Disp: 180 tablet, Rfl: 3    atorvastatin (LIPITOR) 40 MG tablet, Take 1 tablet by mouth Daily., Disp: , Rfl:     bimatoprost (LUMIGAN) 0.01 % ophthalmic drops, Administer 1 drop to the right eye Every Night., Disp: , Rfl:     Cholecalciferol (VITAMIN D3) 5000 units capsule capsule, Take 1 capsule by mouth Daily., Disp: , Rfl:     dapagliflozin Propanediol (Farxiga) 10 MG tablet, Take 10 mg by mouth Daily., Disp: 30 tablet, Rfl: 11    losartan (COZAAR) 25 MG tablet, TAKE 1 TABLET BY MOUTH ONCE A DAY, Disp: 90 tablet, Rfl: 3    montelukast (SINGULAIR) 10 MG tablet, Take 1 tablet by mouth Every Night., Disp: , Rfl:     nitroglycerin (NITROSTAT) 0.4 MG SL tablet, 1 under the tongue as needed for angina, may repeat q5mins for up three doses, Disp: 25 tablet, Rfl: 1    carvedilol (COREG) 3.125 MG tablet, Take 1 tablet by mouth 2 (Two) Times a Day With Meals for 90 days., Disp: 180 tablet, Rfl: 0    Past Medical History:   Diagnosis Date    Arthritis     Cataract     BILAT    Electrolyte imbalance     Hypertension     Vitamin D deficiency        Past Surgical History:   Procedure Laterality Date    ANTERIOR AND POSTERIOR VAGINAL REPAIR  "     APPENDECTOMY      CARDIAC CATHETERIZATION N/A 2022    Procedure: Left Heart Cath;  Surgeon: Michael Davis MD;  Location:  [facility];  Service: Cardiology;  Laterality: N/A;    COLONOSCOPY      EXCISION LESION      RIGHT SHOULDER ABCESSES    EYE SURGERY      HYSTERECTOMY      TOTAL HIP ARTHROPLASTY Right 2018    Procedure: RIGHT TOTAL HIP REPLACEMENT;  Surgeon: Waldemar Serrano MD;  Location:  [facility] OR;  Service: Orthopedics       Social History     Socioeconomic History    Marital status:    Tobacco Use    Smoking status: Former     Current packs/day: 0.00     Average packs/day: 1 pack/day for 15.0 years (15.0 ttl pk-yrs)     Types: Cigarettes     Start date:      Quit date:      Years since quittin.2    Smokeless tobacco: Never   Vaping Use    Vaping status: Never Used   Substance and Sexual Activity    Alcohol use: No    Drug use: No    Sexual activity: Defer       Family History   Problem Relation Age of Onset    Diabetes Father     Heart disease Father     Aneurysm Father     Cancer Sister     Cancer Sister     Alzheimer's disease Sister     Cancer Brother     Heart disease Brother     Heart disease Brother     Diabetes Brother     Cancer Brother        Objective    Temp 98 °F (36.7 °C)   Ht 157.5 cm (62\")   Wt 55.7 kg (122 lb 12.8 oz)   BMI 22.46 kg/m²     Physical Exam  Constitutional:       Appearance: Normal appearance.   HENT:      Head: Normocephalic and atraumatic.   Pulmonary:      Effort: Pulmonary effort is normal.   Neurological:      Mental Status: She is alert.   Psychiatric:         Mood and Affect: Mood normal.         Behavior: Behavior normal.               Assessment and Plan    Diagnoses and all orders for this visit:    1. Recurrent UTI (Primary)  -     Cancel: POC Urinalysis Dipstick, Multipro    2. Incomplete bladder emptying      Documented infection since last seen she continues to perform self catheterizations 4 times daily.  " No difficulties inserting catheter she is getting her supplies without delay.    She will follow-up in 1 year or sooner as needed.

## 2025-03-31 ENCOUNTER — OFFICE VISIT (OUTPATIENT)
Age: 85
End: 2025-03-31
Payer: MEDICARE

## 2025-03-31 VITALS — WEIGHT: 123 LBS | HEIGHT: 62 IN | BODY MASS INDEX: 22.63 KG/M2

## 2025-03-31 DIAGNOSIS — G89.29 CHRONIC LEFT-SIDED LOW BACK PAIN WITHOUT SCIATICA: ICD-10-CM

## 2025-03-31 DIAGNOSIS — M54.50 CHRONIC LEFT-SIDED LOW BACK PAIN WITHOUT SCIATICA: ICD-10-CM

## 2025-03-31 DIAGNOSIS — M16.12 PRIMARY OSTEOARTHRITIS OF LEFT HIP: Primary | ICD-10-CM

## 2025-03-31 PROCEDURE — 4004F PT TOBACCO SCREEN RCVD TLK: CPT | Performed by: PHYSICIAN ASSISTANT

## 2025-03-31 PROCEDURE — G8400 PT W/DXA NO RESULTS DOC: HCPCS | Performed by: PHYSICIAN ASSISTANT

## 2025-03-31 PROCEDURE — G8427 DOCREV CUR MEDS BY ELIG CLIN: HCPCS | Performed by: PHYSICIAN ASSISTANT

## 2025-03-31 PROCEDURE — 1159F MED LIST DOCD IN RCRD: CPT | Performed by: PHYSICIAN ASSISTANT

## 2025-03-31 PROCEDURE — G8420 CALC BMI NORM PARAMETERS: HCPCS | Performed by: PHYSICIAN ASSISTANT

## 2025-03-31 PROCEDURE — 1090F PRES/ABSN URINE INCON ASSESS: CPT | Performed by: PHYSICIAN ASSISTANT

## 2025-03-31 PROCEDURE — 1123F ACP DISCUSS/DSCN MKR DOCD: CPT | Performed by: PHYSICIAN ASSISTANT

## 2025-03-31 PROCEDURE — 99214 OFFICE O/P EST MOD 30 MIN: CPT | Performed by: PHYSICIAN ASSISTANT

## 2025-03-31 ASSESSMENT — ENCOUNTER SYMPTOMS
BACK PAIN: 1
COLOR CHANGE: 0

## 2025-03-31 NOTE — PROGRESS NOTES
[facility]  [facility]  [address] [facility] [place] [address]  Dept: [phone]  Dept Fax: [phone]  Loc: [phone]    Nancy Wolfe is a 84 y.o. female who presents today for her medical conditions/complaints as noted below.  Nancy Wolfe is complaining of Follow-up (Left Hip)        HPI:   Patient is a pleasant 84-year-old female presenting to the clinic today for follow-up on her left hip pain.  At her last visit, x-rays were obtained demonstrating severe bone-on-bone osteoarthritis of the left hip joint and she was sent to pain management for intra-articular steroid injection.  She states that this provided her absolutely no relief.  She did have her right hip replaced by Dr. Rodarte several years ago and states that this pain does seem to be somewhat different from what she experienced with that right hip from what she can remember.  She describes this as lateral hip and buttock pain with some low back pain as well.  She denies any groin discomfort or anterior thigh pain.        Past Medical History:   Diagnosis Date    Arthritis Same as above    Same as above I don’t know which place the answer needs to go    Heart disease July of  possibly  but think     First heart flutter but think at last appointment it is now A-something    Hypertension 200?    Osteoarthritis This year for hip other arthritis for years    Her left hip is now bone on bone       Past Surgical History:   Procedure Laterality Date    ABDOMEN SURGERY   and sometime in the     Hysterectomy in  and A and P repair in the     HIP SURGERY  2018    Right hip replacement       No family history on file.    Social History     Tobacco Use    Smoking status: Former     Current packs/day: 0.00     Types: Cigarettes     Quit date: 1977     Years since quittin.6    Smokeless tobacco: Former   Substance Use Topics    Alcohol use: Not Currently

## 2025-03-31 NOTE — ASSESSMENT & PLAN NOTE
I expressed to the patient that with her physical exam findings, lack of relief from steroid injection, I did not feel that pursuing total hip replacement would be appropriate until her low back was evaluated.  She would like to discuss potential referral to a back specialist with her daughter and will let us know.  She also mention potentially receiving a second opinion from another hip specialist which she will follow-up with us as needed if so.

## 2025-04-09 ENCOUNTER — OFFICE VISIT (OUTPATIENT)
Dept: UROLOGY | Facility: CLINIC | Age: 85
End: 2025-04-09
Payer: MEDICARE

## 2025-04-09 VITALS — HEIGHT: 62 IN | BODY MASS INDEX: 22.6 KG/M2 | TEMPERATURE: 98 F | WEIGHT: 122.8 LBS

## 2025-04-09 DIAGNOSIS — N39.0 RECURRENT UTI: Primary | ICD-10-CM

## 2025-04-09 DIAGNOSIS — R33.9 INCOMPLETE BLADDER EMPTYING: ICD-10-CM

## 2025-04-28 ENCOUNTER — OFFICE VISIT (OUTPATIENT)
Dept: CARDIOLOGY | Facility: CLINIC | Age: 85
End: 2025-04-28
Payer: MEDICARE

## 2025-04-28 VITALS
HEIGHT: 62 IN | OXYGEN SATURATION: 100 % | DIASTOLIC BLOOD PRESSURE: 66 MMHG | HEART RATE: 73 BPM | WEIGHT: 120.2 LBS | SYSTOLIC BLOOD PRESSURE: 108 MMHG | BODY MASS INDEX: 22.12 KG/M2

## 2025-04-28 DIAGNOSIS — I25.2 STATUS POST NON-ST ELEVATION MYOCARDIAL INFARCTION (NSTEMI): ICD-10-CM

## 2025-04-28 DIAGNOSIS — I48.92 ATRIAL FLUTTER WITH CONTROLLED RESPONSE: Chronic | ICD-10-CM

## 2025-04-28 DIAGNOSIS — I10 ESSENTIAL HYPERTENSION: ICD-10-CM

## 2025-04-28 DIAGNOSIS — I48.19 ATRIAL FIBRILLATION, PERSISTENT: Primary | Chronic | ICD-10-CM

## 2025-04-28 DIAGNOSIS — I50.22 CHRONIC SYSTOLIC CHF (CONGESTIVE HEART FAILURE): ICD-10-CM

## 2025-04-28 PROCEDURE — 1159F MED LIST DOCD IN RCRD: CPT | Performed by: NURSE PRACTITIONER

## 2025-04-28 PROCEDURE — 3074F SYST BP LT 130 MM HG: CPT | Performed by: NURSE PRACTITIONER

## 2025-04-28 PROCEDURE — 3078F DIAST BP <80 MM HG: CPT | Performed by: NURSE PRACTITIONER

## 2025-04-28 PROCEDURE — 99214 OFFICE O/P EST MOD 30 MIN: CPT | Performed by: NURSE PRACTITIONER

## 2025-04-28 PROCEDURE — 1160F RVW MEDS BY RX/DR IN RCRD: CPT | Performed by: NURSE PRACTITIONER

## 2025-04-28 PROCEDURE — 93000 ELECTROCARDIOGRAM COMPLETE: CPT | Performed by: NURSE PRACTITIONER

## 2025-04-28 NOTE — PROGRESS NOTES
Subjective:     Encounter Date: 4/28/2025      Patient ID: Ashley Maher is a 84 y.o. female.    Chief Complaint: follow up NSTEMI, CHF, atrial flutter     History of Present Illness     The patient presents to follow up regarding her NSTEMI, afib, aflutter, and systolic CHF.     She was admitted 7/24/2022 and established care with Dr. Davis at that time. She reports the night prior she had a 2-3 hr episode of acute chest pain without radiation or other symptoms. Workup revealed type I NSTEMI with a peak troponin of 2.55 and evidence of some lateral Q waves and non specific T wave abnormalities on EKG. She was found to be positive for COVID-19, though did not appear to be symptomatic from that. She was also noted to be hyponatremic. Given her lack of any recurrent chest pain, hemodynamic stability and positive COVID -19 status, her NSTEMI was treated non invasively. She received anticoagulation with lovenox x 48 hr and was started on GDMT with ASA, plavix, high intensity statin, and BB. ACEI was transitioned to ARB at [facility] given her cough. Echo revealed an LVEF of 45%. She was discharged home in stable condition on 7/26 with recommendations per Dr. Davis for 1-2 week follow up, at which point further risk stratification with a nuclear stress test would be considered. She was not referred for cardiac rehab given the need for further ischemic evaluation as an outpatient.     I followed up with the patient in August 2022 and she was doing well.  She had completed physical therapy at home.  She was able to walk around her home in her yard with her dog without any chest discomfort or shortness of breath.  She used a walker for walking longer distances.  She was without complaint aside from some generalized weakness.  She was tolerating her medications.  At that visit, for further risk stratification I did order the Lexiscan.  This was high risk and that Dr. Davis subsequently proceeded with cardiac catheterization on  "9/30/2022.  See full details in cath report-she was found to have near normal coronary arteries but did have an LVEF of 40% and evidence of elevated LVEDP and volume overload on exam that day.  Therefore, Dr. Davis added an SGLT2 inhibitor to her medical therapy and instructed her to continue all other medical therapy.  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    I followed up with the patient in mid January 2023 and she was feeling well.  She denied any chest discomfort, shortness of breath, orthopnea, PND.  She was unable to tell me what medications that she was taking at home.  After completing an accurate medication reconciliation, it was revealed she was not taking her losartan at home, so this was resumed.    I followed up with her on 4/25 and she was feeling well/about the same compared to last visit.  She reported issues with her balance and generalized weakness.  She stated she was able to walk up and down her driveway without any symptoms.  She denied any chest discomfort, shortness of breath, orthopnea, PND.  She was incidentally found to be in rate controlled atrial flutter that day.  This was a new diagnosis.  Plavix was discontinued and she was started on Eliquis 2.5 mg twice daily.  She was placed in a 14-day Holter monitor.  See results below.    She followed up on 5/23 and was doing well.  Her only complaint was feeling \"lazy and tired.\"  Cardioversion was ordered for persistent, rate controlled atrial flutter.    She was successfully cardioverted by Dr. Davis on 5/31 and placed in a 14-day Holter monitor.  See results below.    I followed up with her in early July and she reported she felt no different after cardioversion.  Her only complaint was \"feeling lazy.\"  She was not having any chest discomfort or shortness of breath.  She was in rate controlled atrial flutter that day and seemed to be " "tolerating this pretty well.  After discussion with Dr. Davis, I did refer her to electrophysiology for the possibility of ablation.    I saw the patient October 2023.  She saw EP that same morning.  Ultimately, she remained in rate controlled atrial fibrillation and was felt to be asymptomatic, therefore conservative approach was pursued with rate control.  She was doing well and reported some mild fatigue but otherwise had no complaints.    She was doing well at her October 2024 visit.  No changes were made but she did report 1 fall since her prior visit so we discussed Watchman device implantation as an alternative to long-term anticoagulation.  She declined but I did provide her educational materials and instructed her to contact us if she wanted to pursue this further.    Today the patient states she is doing well from a cardiac perspective.  Her primary complaint is hip pain.  She states \"it is bone-on-bone.\"  She is ambulating very little with a cane.  She has some weakness and fatigue.  She denies any chest pain, shortness of breath, palpitations, orthopnea, PND, syncope or presyncope.  Blood pressure is well-controlled.  Weight is stable.  She denies any further falls since her last visit.  She denies any bleeding issues.      The following portions of the patient's history were reviewed and updated as appropriate: allergies, current medications, past family history, past medical history, past social history, past surgical history and problem list.    Review of Systems   Constitutional: Positive for malaise/fatigue.   Cardiovascular:  Negative for chest pain, claudication, dyspnea on exertion, leg swelling, near-syncope, orthopnea, palpitations, paroxysmal nocturnal dyspnea and syncope.   Respiratory:  Negative for cough and shortness of breath.    Hematologic/Lymphatic: Does not bruise/bleed easily.   Musculoskeletal:  Positive for joint pain. Negative for falls.   Gastrointestinal:  Negative for bloating. " "  Neurological:  Positive for weakness. Negative for dizziness and light-headedness.       Allergies   Allergen Reactions    Sulfa Antibiotics Other (See Comments)     EXTREMELY LOW BLOOD PRESSURE    Pyridium [Phenazopyridine Hcl] Swelling     RASH WITH SWELLING FACE    Tramadol Dizziness     STATES SHE COULDN'T THINK    Trimethoprim Unknown (See Comments)     RECEIVED FROM [facility], PT UNSURE OF REACTION    Paba Derivatives Rash     INGREDIENT IN SUNSCREENS       Current Outpatient Medications:     apixaban (Eliquis) 2.5 MG tablet tablet, TAKE 1 TABLET BY MOUTH EVERY 12 HOURS, Disp: 180 tablet, Rfl: 3    atorvastatin (LIPITOR) 40 MG tablet, Take 1 tablet by mouth Daily., Disp: , Rfl:     bimatoprost (LUMIGAN) 0.01 % ophthalmic drops, Administer 1 drop to the right eye Every Night., Disp: , Rfl:     carvedilol (COREG) 3.125 MG tablet, Take 1 tablet by mouth 2 (Two) Times a Day With Meals for 90 days., Disp: 180 tablet, Rfl: 0    Cholecalciferol (VITAMIN D3) 5000 units capsule capsule, Take 1 capsule by mouth Daily., Disp: , Rfl:     dapagliflozin Propanediol (Farxiga) 10 MG tablet, Take 10 mg by mouth Daily., Disp: 30 tablet, Rfl: 11    losartan (COZAAR) 25 MG tablet, TAKE 1 TABLET BY MOUTH ONCE A DAY, Disp: 90 tablet, Rfl: 3    montelukast (SINGULAIR) 10 MG tablet, Take 1 tablet by mouth Every Night., Disp: , Rfl:     nitroglycerin (NITROSTAT) 0.4 MG SL tablet, 1 under the tongue as needed for angina, may repeat q5mins for up three doses, Disp: 25 tablet, Rfl: 1         Objective:    /66   Pulse 73   Ht 157.5 cm (62\")   Wt 54.5 kg (120 lb 3.2 oz)   SpO2 100%   BMI 21.98 kg/m²        Vitals and nursing note reviewed.   Constitutional:       General: Not in acute distress.     Appearance: Well-developed and not in distress. Frail. Not diaphoretic.   Neck:      Vascular: No JVD.   Pulmonary:      Effort: Pulmonary effort is normal. No respiratory distress.      Breath sounds: Normal breath sounds. " "  Cardiovascular:      Normal rate. Irregular rhythm.      Murmurs: There is no murmur.   Edema:     Peripheral edema absent.   Abdominal:      Tenderness: There is no abdominal tenderness.   Skin:     General: Skin is warm and dry.   Neurological:      Mental Status: Alert and oriented to person, place, and time.         Lab Review:   Lab Results   Component Value Date    GLUCOSE 99 01/17/2023    BUN 13 01/17/2023    CREATININE 0.80 03/24/2023    EGFRIFNONA 109 07/20/2018    BCR 20.3 01/17/2023    K 4.1 01/17/2023    CO2 26.0 01/17/2023    CALCIUM 8.8 01/17/2023    ALBUMIN 4.0 01/17/2023    AST 25 01/17/2023    ALT 18 01/17/2023     Lab Results   Component Value Date    CHOL 113 09/30/2022    TRIG 37 09/30/2022    HDL 58 09/30/2022    LDL 45 09/30/2022     Lab Results   Component Value Date    HGBA1C 5.60 07/24/2022     Lab Results   Component Value Date    WBC 4.65 09/30/2022    HGB 11.7 (L) 09/30/2022    HCT 35.3 09/30/2022    MCV 97.5 (H) 09/30/2022     09/30/2022     No results found for: \"TSH\"            ECG 12 Lead    Date/Time: 4/28/2025 11:02 AM  Performed by: Anjali Avendano APRN    Authorized by: Anjali Avendano APRN  Comparison: compared with previous ECG from 10/1/2024  Similar to previous ECG  Rhythm: atrial fibrillation  BPM: 73  Other findings: non-specific ST-T wave changes    Clinical impression: abnormal EKG            Results for orders placed during the hospital encounter of 07/24/22    Adult Transthoracic Echo Complete W/ Cont if Necessary Per Protocol    Interpretation Summary  · Calculated left ventricular 3D EF = 45% Estimated left ventricular EF = 45% Left ventricular systolic function is mildly decreased.  · The following left ventricular wall segments are hypokinetic: mid anterior, apical anterior, mid anterolateral, apical lateral and mid inferolateral.  · Left ventricular diastolic function is consistent with (grade II w/high LAP) pseudonormalization.  · Estimated right ventricular " systolic pressure from tricuspid regurgitation is mildly elevated (35-45 mmHg).  · Normal size and function of the right ventricle.  · No significant (greater than mild) valvular pathology.    9/30/2022 cath report:    Impression:  1.  Near-normal coronary arteries  2.  LVEF 40% with lateral wall akinesis, not explained by any obstructive coronary disease.  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  Elevated LVEDP     Plan:   1.  2 hours bedrest, then can be discharged home  2.  Continue aspirin 81mg daily indefinitely, and all other current medical therapies including statin, beta-blocker, ARB  3.  In light of reduced EF with elevated LVEDP, as well as bibasilar rales on exam before the procedure, will add SGLT2 inhibitor for better CHF treatment  4.  Follow-up with Anjali Alcaraz in 3 months        Michael Davis MD    4/2023 14 day holter:      An abnormal monitor study.    Predominant rhythm was atrial flutter.    100% burden of atrial flutter, with average ventricular rate 88 bpm (range ).    Patient never triggered the device or reported any symptoms.     14 day holter worn after successful cardioversion on 5/31/2023:     A relatively benign monitor study.    Predominant rhythm was normal sinus rhythm.    No sustained arrhythmias noted.    Patient never triggered the device or reported any symptoms.    Occasional (1.7%) isolated PACs.    Numerous episodes of asymptomatic supraventricular tachycardia (SVT) noted, the longest of which was 15 beats at an average rate of 104 bpm.  These are common and benign findings.     Assessment:      Problem List Items Addressed This Visit          Cardiac and Vasculature    Atrial flutter with controlled response (Chronic)    Atrial fibrillation, persistent - Primary (Chronic)    Status post non-ST elevation myocardial infarction  (NSTEMI)    Overview   Type I NSTEMI 7/24/2022 - medically managed given positive COVID 19 status, without recurrent chest pain following initial event.     Given the non-STEMI, Lexiscan was ordered for further risk stratification and this was high risk and she subsequently underwent cardiac catheterization on 9/30 and this revealed near normal coronary arteries.  It is felt the regional wall motion abnormalities were potentially due to COVID myocarditis at the time of her non-STEMI.         Essential hypertension    Chronic systolic CHF (congestive heart failure)    Overview   LVEF 40-45% with elevated LVEDP on [facility] 9/30/22            Plan:     1.  Permanent atrial fibrillation/flutter: Rate controlled and asymptomatic.    -Continue Eliquis 2.5 mg twice daily  -Continue current dose of Coreg for rate control    2. NSTEMI : Stable. No recurrent CP since NSTEMI occurred 7/23-7/24/22.  She was subsequently found to have near normal coronary arteries per cardiac catheterization 9/30/2022 after Lexiscan for risk stratification was high risk.  It is suspected the etiology for lateral akinesis may be COVID myocarditis (had COVID at time of NSTEMI)    -Continue high intensity statin, beta-blocker, as needed sublingual nitroglycerin  -No longer on antiplatelet medication given need for anticoagulation and no prior PCI.    3. Hypertension: Well controlled.  Continue losartan and Coreg.    4. Hyperlipidemia: LDL well controlled at 45 per most recent labs available to me; continue atorvastatin 40, with goal LDL less than 70.    5.  Chronic systolic CHF: She appears compensated/euvolemic on exam today and is denying dyspnea, rapid weight gain, orthopnea, PND.  Continue Coreg, losartan and Farxiga. We reviewed signs and symptoms consistent with acute CHF and what to report.  Weigh daily.  Heart healthy low-sodium diet.    Follow up 6 months with Dr. Davis, call sooner with new or worsening symptoms

## 2025-05-07 ENCOUNTER — TELEPHONE (OUTPATIENT)
Dept: CARDIOLOGY | Facility: CLINIC | Age: 85
End: 2025-05-07
Payer: COMMERCIAL

## 2025-05-07 NOTE — TELEPHONE ENCOUNTER
We received a call from Hemalatha with [facility].  The patient is being scheduled for a left total hip replacement.  They are requesting a cardiac risk assessment as well as recommendations for holding Eliquis.  Thank you.

## (undated) DEVICE — SOL IRR NACL 0.9PCT BT 1000ML

## (undated) DEVICE — GLV SURG TRIUMPH PF LTX 7.5 STRL

## (undated) DEVICE — BIPOLAR SEALER 23-112-1 AQM 6.0: Brand: AQUAMANTYS™

## (undated) DEVICE — Device: Brand: MEDEX

## (undated) DEVICE — SCINTILLANT SURGICAL LIGHT WITH SUCTION: Brand: SCINTILLANT

## (undated) DEVICE — Device

## (undated) DEVICE — PENCL E/S PLUMEPEN HLSTR 3/8IN 10FT CA/5

## (undated) DEVICE — ANTIBACTERIAL UNDYED BRAIDED (POLYGLACTIN 910), SYNTHETIC ABSORBABLE SUTURE: Brand: COATED VICRYL

## (undated) DEVICE — GLV SURG TRIUMPH PF LTX 8 STRL

## (undated) DEVICE — SPK10183 ORTHOPEDIC FRACTURE AND TRAUMA KIT: Brand: SPK10183 ORTHOPEDIC FRACTURE AND TRAUMA KIT

## (undated) DEVICE — PK CATH CARD 30 CA/4

## (undated) DEVICE — BLD SAW AGGR 1.27X19X90MM STRL

## (undated) DEVICE — GLV SURG TRIUMPH GREEN W/ALOE PF LTX 8.5 STRL

## (undated) DEVICE — SYS SKIN CLS DERMABOND PRINEO W/22CM MESH TP

## (undated) DEVICE — GOWN,PREVENTION PLUS,XLONG/XLARGE,STRL: Brand: MEDLINE

## (undated) DEVICE — 3M™ IOBAN™ 2 ANTIMICROBIAL INCISE DRAPE 6651EZ: Brand: IOBAN™ 2

## (undated) DEVICE — PINNACLE INTRODUCER SHEATH: Brand: PINNACLE

## (undated) DEVICE — ANGIO-SEAL VIP VASCULAR CLOSURE DEVICE: Brand: ANGIO-SEAL

## (undated) DEVICE — CANN NASL ETCO2 LO/FLO A/

## (undated) DEVICE — PAD, DEFIB, ADULT, RADIOTRANS, PHYSIO: Brand: MEDLINE

## (undated) DEVICE — 4-PORT MANIFOLD: Brand: NEPTUNE 2

## (undated) DEVICE — PK HIP TOTL ANT 30

## (undated) DEVICE — PK TURNOVER RM ADV

## (undated) DEVICE — INF TL MULIPACK FR6: Brand: INFINITI

## (undated) DEVICE — GLV SURG TRIUMPH MICRO PF LTX 8 STRL

## (undated) DEVICE — CLTH CLENS READYCLEANSE PERI CARE PK/5

## (undated) DEVICE — DRSNG BRDR MEPILEX P/OP SIL 4X12IN

## (undated) DEVICE — SHORT LENS-STERILE

## (undated) DEVICE — SOLIDIFIER LIQUI LOC PLUS 2000CC